# Patient Record
Sex: FEMALE | Race: WHITE | NOT HISPANIC OR LATINO | Employment: OTHER | RURAL
[De-identification: names, ages, dates, MRNs, and addresses within clinical notes are randomized per-mention and may not be internally consistent; named-entity substitution may affect disease eponyms.]

---

## 2011-03-11 LAB — CRC RECOMMENDATION EXT: NORMAL

## 2020-10-21 ENCOUNTER — HISTORICAL (OUTPATIENT)
Dept: ADMINISTRATIVE | Facility: HOSPITAL | Age: 65
End: 2020-10-21

## 2021-03-13 DIAGNOSIS — Z12.31 SCREENING MAMMOGRAM FOR HIGH-RISK PATIENT: Primary | ICD-10-CM

## 2021-04-05 RX ORDER — CARBAMAZEPINE 100 MG/1
100 TABLET, CHEWABLE ORAL 2 TIMES DAILY
COMMUNITY
Start: 2021-01-20 | End: 2022-03-24

## 2021-04-05 RX ORDER — FLUTICASONE PROPIONATE 50 MCG
2 SPRAY, SUSPENSION (ML) NASAL DAILY
COMMUNITY
Start: 2021-02-17 | End: 2021-10-13

## 2021-04-05 RX ORDER — AZELASTINE 1 MG/ML
SPRAY, METERED NASAL
COMMUNITY
Start: 2021-03-06 | End: 2021-12-29

## 2021-04-05 RX ORDER — INSULIN LISPRO 100 [IU]/ML
INJECTION, SOLUTION INTRAVENOUS; SUBCUTANEOUS
COMMUNITY
End: 2022-03-25

## 2021-04-05 RX ORDER — COLESEVELAM 180 1/1
1875 TABLET ORAL 2 TIMES DAILY WITH MEALS
COMMUNITY
End: 2022-03-10 | Stop reason: SDUPTHER

## 2021-04-05 RX ORDER — BREXPIPRAZOLE 2 MG/1
1 TABLET ORAL DAILY
COMMUNITY
Start: 2021-02-26 | End: 2021-07-09 | Stop reason: SDUPTHER

## 2021-04-05 RX ORDER — GABAPENTIN 100 MG/1
CAPSULE ORAL
COMMUNITY
Start: 2020-12-30 | End: 2021-07-13 | Stop reason: SDUPTHER

## 2021-04-05 RX ORDER — DULOXETIN HYDROCHLORIDE 60 MG/1
60 CAPSULE, DELAYED RELEASE ORAL DAILY
COMMUNITY
Start: 2021-02-10 | End: 2021-11-09

## 2021-04-05 RX ORDER — DICLOFENAC SODIUM 10 MG/G
2 GEL TOPICAL 4 TIMES DAILY PRN
COMMUNITY

## 2021-04-05 RX ORDER — CETIRIZINE HYDROCHLORIDE 10 MG/1
1 TABLET ORAL DAILY
COMMUNITY
Start: 2021-03-01 | End: 2022-09-28 | Stop reason: SDUPTHER

## 2021-04-05 RX ORDER — HYDROXYZINE PAMOATE 25 MG/1
25 CAPSULE ORAL EVERY 6 HOURS PRN
COMMUNITY
Start: 2021-03-02 | End: 2022-01-26 | Stop reason: SDUPTHER

## 2021-04-05 RX ORDER — MIRABEGRON 50 MG/1
1 TABLET, FILM COATED, EXTENDED RELEASE ORAL DAILY
COMMUNITY
End: 2021-07-09 | Stop reason: SDUPTHER

## 2021-04-05 RX ORDER — LOSARTAN POTASSIUM 50 MG/1
50 TABLET ORAL DAILY
COMMUNITY
Start: 2021-03-09 | End: 2021-05-07 | Stop reason: DRUGHIGH

## 2021-04-05 RX ORDER — NYSTATIN 100000 U/G
CREAM TOPICAL
COMMUNITY
Start: 2021-02-10 | End: 2023-06-08 | Stop reason: SDUPTHER

## 2021-04-05 RX ORDER — TRAZODONE HYDROCHLORIDE 150 MG/1
1 TABLET ORAL NIGHTLY
COMMUNITY
Start: 2021-01-13 | End: 2022-01-10 | Stop reason: SDUPTHER

## 2021-04-05 RX ORDER — MELOXICAM 7.5 MG/1
1 TABLET ORAL DAILY
COMMUNITY
Start: 2021-02-23 | End: 2022-02-16 | Stop reason: SDUPTHER

## 2021-04-05 RX ORDER — PROMETHAZINE HYDROCHLORIDE 25 MG/1
TABLET ORAL
COMMUNITY
Start: 2021-01-15 | End: 2021-07-09 | Stop reason: SDUPTHER

## 2021-04-05 RX ORDER — LEVOTHYROXINE SODIUM 175 UG/1
1 TABLET ORAL DAILY
COMMUNITY
Start: 2021-02-17 | End: 2022-03-25

## 2021-04-05 RX ORDER — GABAPENTIN 800 MG/1
TABLET ORAL
COMMUNITY
Start: 2021-03-01 | End: 2021-07-09 | Stop reason: SDUPTHER

## 2021-04-05 RX ORDER — MONTELUKAST SODIUM 10 MG/1
10 TABLET ORAL DAILY
COMMUNITY
Start: 2021-02-01 | End: 2022-02-16 | Stop reason: SDUPTHER

## 2021-04-05 RX ORDER — LATANOPROST 50 UG/ML
SOLUTION/ DROPS OPHTHALMIC
COMMUNITY
Start: 2021-02-22

## 2021-04-05 RX ORDER — TIZANIDINE 4 MG/1
TABLET ORAL
COMMUNITY
Start: 2021-01-27 | End: 2022-01-31 | Stop reason: SDUPTHER

## 2021-04-05 RX ORDER — INSULIN ASPART 100 [IU]/ML
50 INJECTION, SOLUTION INTRAVENOUS; SUBCUTANEOUS
COMMUNITY
End: 2023-06-08 | Stop reason: SDUPTHER

## 2021-04-05 RX ORDER — VALACYCLOVIR HYDROCHLORIDE 1 G/1
TABLET, FILM COATED ORAL
COMMUNITY
Start: 2021-02-05 | End: 2021-04-09 | Stop reason: SDUPTHER

## 2021-04-05 RX ORDER — OXYCODONE AND ACETAMINOPHEN 10; 325 MG/1; MG/1
TABLET ORAL
COMMUNITY
Start: 2021-03-12 | End: 2021-04-09 | Stop reason: SDUPTHER

## 2021-04-05 RX ORDER — LEVOTHYROXINE SODIUM 150 MCG
TABLET ORAL
COMMUNITY
Start: 2021-02-17 | End: 2022-01-19 | Stop reason: SDUPTHER

## 2021-04-05 RX ORDER — LINACLOTIDE 72 UG/1
1 CAPSULE, GELATIN COATED ORAL DAILY
COMMUNITY
Start: 2021-03-09 | End: 2022-01-10 | Stop reason: SDUPTHER

## 2021-04-05 RX ORDER — DEXLANSOPRAZOLE 60 MG/1
60 CAPSULE, DELAYED RELEASE ORAL 2 TIMES DAILY
COMMUNITY
End: 2022-04-11

## 2021-04-05 RX ORDER — BENZONATATE 100 MG/1
100 CAPSULE ORAL 3 TIMES DAILY PRN
COMMUNITY
End: 2021-04-07 | Stop reason: SDUPTHER

## 2021-04-07 RX ORDER — BENZONATATE 100 MG/1
100 CAPSULE ORAL 3 TIMES DAILY PRN
Qty: 90 CAPSULE | Refills: 5 | Status: SHIPPED | OUTPATIENT
Start: 2021-04-07 | End: 2021-04-09 | Stop reason: SDUPTHER

## 2021-04-08 ENCOUNTER — TELEPHONE (OUTPATIENT)
Dept: FAMILY MEDICINE | Facility: CLINIC | Age: 66
End: 2021-04-08

## 2021-04-09 ENCOUNTER — OFFICE VISIT (OUTPATIENT)
Dept: FAMILY MEDICINE | Facility: CLINIC | Age: 66
End: 2021-04-09
Payer: MEDICARE

## 2021-04-09 VITALS
DIASTOLIC BLOOD PRESSURE: 80 MMHG | RESPIRATION RATE: 18 BRPM | TEMPERATURE: 97 F | HEART RATE: 90 BPM | SYSTOLIC BLOOD PRESSURE: 160 MMHG | WEIGHT: 144 LBS | BODY MASS INDEX: 29.03 KG/M2 | HEIGHT: 59 IN

## 2021-04-09 DIAGNOSIS — G89.4 CHRONIC PAIN SYNDROME: ICD-10-CM

## 2021-04-09 DIAGNOSIS — R74.01 ELEVATED LEVELS OF TRANSAMINASE & LACTIC ACID DEHYDROGENASE: ICD-10-CM

## 2021-04-09 DIAGNOSIS — E03.9 HYPOTHYROIDISM, UNSPECIFIED TYPE: ICD-10-CM

## 2021-04-09 DIAGNOSIS — E10.649 TYPE 1 DIABETES MELLITUS WITH HYPOGLYCEMIA AND WITHOUT COMA: Primary | ICD-10-CM

## 2021-04-09 DIAGNOSIS — Z79.899 ENCOUNTER FOR LONG-TERM (CURRENT) USE OF OTHER MEDICATIONS: ICD-10-CM

## 2021-04-09 DIAGNOSIS — R74.02 ELEVATED LEVELS OF TRANSAMINASE & LACTIC ACID DEHYDROGENASE: ICD-10-CM

## 2021-04-09 PROCEDURE — 99214 PR OFFICE/OUTPT VISIT, EST, LEVL IV, 30-39 MIN: ICD-10-PCS | Mod: ,,, | Performed by: FAMILY MEDICINE

## 2021-04-09 PROCEDURE — 99214 OFFICE O/P EST MOD 30 MIN: CPT | Mod: ,,, | Performed by: FAMILY MEDICINE

## 2021-04-09 RX ORDER — VALACYCLOVIR HYDROCHLORIDE 1 G/1
500 TABLET, FILM COATED ORAL EVERY 12 HOURS
Qty: 14 TABLET | Refills: 5 | Status: SHIPPED | OUTPATIENT
Start: 2021-04-09 | End: 2022-03-10

## 2021-04-09 RX ORDER — OXYCODONE AND ACETAMINOPHEN 10; 325 MG/1; MG/1
1 TABLET ORAL EVERY 6 HOURS PRN
Qty: 120 TABLET | Refills: 0 | Status: SHIPPED | OUTPATIENT
Start: 2021-04-09 | End: 2021-07-09 | Stop reason: SDUPTHER

## 2021-04-09 RX ORDER — OXYCODONE AND ACETAMINOPHEN 10; 325 MG/1; MG/1
1 TABLET ORAL EVERY 6 HOURS PRN
Qty: 120 TABLET | Refills: 0 | Status: SHIPPED | OUTPATIENT
Start: 2021-04-09 | End: 2021-04-09 | Stop reason: SDUPTHER

## 2021-04-09 RX ORDER — BENZONATATE 100 MG/1
100 CAPSULE ORAL 3 TIMES DAILY PRN
Qty: 90 CAPSULE | Refills: 5 | Status: SHIPPED | OUTPATIENT
Start: 2021-04-09 | End: 2021-11-10

## 2021-04-11 PROBLEM — Z79.899 ENCOUNTER FOR LONG-TERM (CURRENT) USE OF OTHER MEDICATIONS: Status: ACTIVE | Noted: 2021-04-11

## 2021-05-05 ENCOUNTER — TELEPHONE (OUTPATIENT)
Dept: FAMILY MEDICINE | Facility: CLINIC | Age: 66
End: 2021-05-05

## 2021-05-05 RX ORDER — OXYCODONE AND ACETAMINOPHEN 7.5; 325 MG/1; MG/1
1 TABLET ORAL EVERY 6 HOURS PRN
Qty: 20 TABLET | Refills: 0 | Status: SHIPPED | OUTPATIENT
Start: 2021-05-05 | End: 2021-07-09 | Stop reason: DRUGHIGH

## 2021-05-07 ENCOUNTER — TELEPHONE (OUTPATIENT)
Dept: FAMILY MEDICINE | Facility: CLINIC | Age: 66
End: 2021-05-07

## 2021-05-07 RX ORDER — LOSARTAN POTASSIUM 100 MG/1
100 TABLET ORAL DAILY
Qty: 90 TABLET | Refills: 3 | Status: SHIPPED | OUTPATIENT
Start: 2021-05-07 | End: 2022-04-27

## 2021-07-09 ENCOUNTER — OFFICE VISIT (OUTPATIENT)
Dept: FAMILY MEDICINE | Facility: CLINIC | Age: 66
End: 2021-07-09
Payer: MEDICARE

## 2021-07-09 VITALS
SYSTOLIC BLOOD PRESSURE: 140 MMHG | WEIGHT: 151 LBS | TEMPERATURE: 98 F | BODY MASS INDEX: 30.44 KG/M2 | DIASTOLIC BLOOD PRESSURE: 78 MMHG | HEART RATE: 90 BPM | RESPIRATION RATE: 16 BRPM | HEIGHT: 59 IN

## 2021-07-09 DIAGNOSIS — E10.649 TYPE 1 DIABETES MELLITUS WITH HYPOGLYCEMIA AND WITHOUT COMA: Primary | ICD-10-CM

## 2021-07-09 DIAGNOSIS — G89.4 CHRONIC PAIN SYNDROME: ICD-10-CM

## 2021-07-09 DIAGNOSIS — R74.01 ELEVATED LEVELS OF TRANSAMINASE & LACTIC ACID DEHYDROGENASE: ICD-10-CM

## 2021-07-09 DIAGNOSIS — Z79.899 ENCOUNTER FOR LONG-TERM (CURRENT) USE OF OTHER MEDICATIONS: ICD-10-CM

## 2021-07-09 DIAGNOSIS — R74.02 ELEVATED LEVELS OF TRANSAMINASE & LACTIC ACID DEHYDROGENASE: ICD-10-CM

## 2021-07-09 DIAGNOSIS — M81.0 OSTEOPOROSIS, UNSPECIFIED OSTEOPOROSIS TYPE, UNSPECIFIED PATHOLOGICAL FRACTURE PRESENCE: ICD-10-CM

## 2021-07-09 LAB
ALBUMIN SERPL BCP-MCNC: 3.7 G/DL (ref 3.5–5)
ALBUMIN/GLOB SERPL: 1.1 {RATIO}
ALP SERPL-CCNC: 148 U/L (ref 55–142)
ALT SERPL W P-5'-P-CCNC: 172 U/L (ref 13–56)
ANION GAP SERPL CALCULATED.3IONS-SCNC: 8 MMOL/L (ref 7–16)
AST SERPL W P-5'-P-CCNC: 444 U/L (ref 15–37)
BILIRUB SERPL-MCNC: 0.4 MG/DL (ref 0–1.2)
BUN SERPL-MCNC: 10 MG/DL (ref 7–18)
BUN/CREAT SERPL: 15 (ref 6–20)
CALCIUM SERPL-MCNC: 9.1 MG/DL (ref 8.5–10.1)
CHLORIDE SERPL-SCNC: 98 MMOL/L (ref 98–107)
CO2 SERPL-SCNC: 28 MMOL/L (ref 21–32)
CREAT SERPL-MCNC: 0.68 MG/DL (ref 0.55–1.02)
CTP QC/QA: YES
EST. AVERAGE GLUCOSE BLD GHB EST-MCNC: 160 MG/DL
GLOBULIN SER-MCNC: 3.4 G/DL (ref 2–4)
GLUCOSE SERPL-MCNC: 163 MG/DL (ref 74–106)
HBA1C MFR BLD HPLC: 7.4 % (ref 4.5–6.6)
POC (AMP) AMPHETAMINE: NEGATIVE
POC (BAR) BARBITURATES: NEGATIVE
POC (BUP) BUPRENORPHINE: NEGATIVE
POC (BZO) BENZODIAZEPINES: NEGATIVE
POC (COC) COCAINE: NEGATIVE
POC (MDMA) METHYLENEDIOXYMETHAMPHETAMINE 3,4: NEGATIVE
POC (MET) METHAMPHETAMINE: NEGATIVE
POC (MOP) OPIATES: NEGATIVE
POC (MTD) METHADONE: NEGATIVE
POC (OXY) OXYCODONE: ABNORMAL
POC (PCP) PHENCYCLIDINE: NEGATIVE
POC (TCA) TRICYCLIC ANTIDEPRESSANTS: NEGATIVE
POC TEMPERATURE (URINE): 94
POC THC: NEGATIVE
POTASSIUM SERPL-SCNC: 4.2 MMOL/L (ref 3.5–5.1)
PROT SERPL-MCNC: 7.1 G/DL (ref 6.4–8.2)
SODIUM SERPL-SCNC: 130 MMOL/L (ref 136–145)

## 2021-07-09 PROCEDURE — 83036 HEMOGLOBIN A1C: ICD-10-PCS | Mod: ,,, | Performed by: CLINICAL MEDICAL LABORATORY

## 2021-07-09 PROCEDURE — 80053 COMPREHENSIVE METABOLIC PANEL: ICD-10-PCS | Mod: ,,, | Performed by: CLINICAL MEDICAL LABORATORY

## 2021-07-09 PROCEDURE — 99213 PR OFFICE/OUTPT VISIT, EST, LEVL III, 20-29 MIN: ICD-10-PCS | Mod: 25,,, | Performed by: FAMILY MEDICINE

## 2021-07-09 PROCEDURE — 96372 PR INJECTION,THERAP/PROPH/DIAG2ST, IM OR SUBCUT: ICD-10-PCS | Mod: ,,, | Performed by: FAMILY MEDICINE

## 2021-07-09 PROCEDURE — 80305 DRUG TEST PRSMV DIR OPT OBS: CPT | Mod: RHCUB | Performed by: FAMILY MEDICINE

## 2021-07-09 PROCEDURE — 80053 COMPREHEN METABOLIC PANEL: CPT | Mod: ,,, | Performed by: CLINICAL MEDICAL LABORATORY

## 2021-07-09 PROCEDURE — 99213 OFFICE O/P EST LOW 20 MIN: CPT | Mod: 25,,, | Performed by: FAMILY MEDICINE

## 2021-07-09 PROCEDURE — 96372 THER/PROPH/DIAG INJ SC/IM: CPT | Mod: ,,, | Performed by: FAMILY MEDICINE

## 2021-07-09 PROCEDURE — 83036 HEMOGLOBIN GLYCOSYLATED A1C: CPT | Mod: ,,, | Performed by: CLINICAL MEDICAL LABORATORY

## 2021-07-09 RX ORDER — GABAPENTIN 800 MG/1
800 TABLET ORAL NIGHTLY
Qty: 90 TABLET | Refills: 3 | Status: SHIPPED | OUTPATIENT
Start: 2021-07-09 | End: 2022-08-10 | Stop reason: SDUPTHER

## 2021-07-09 RX ORDER — OXYCODONE AND ACETAMINOPHEN 10; 325 MG/1; MG/1
1 TABLET ORAL EVERY 6 HOURS PRN
Qty: 120 TABLET | Refills: 0 | Status: SHIPPED | OUTPATIENT
Start: 2021-07-09 | End: 2021-10-08 | Stop reason: SDUPTHER

## 2021-07-09 RX ORDER — MIRABEGRON 50 MG/1
1 TABLET, FILM COATED, EXTENDED RELEASE ORAL DAILY
Qty: 90 TABLET | Refills: 3 | Status: SHIPPED | OUTPATIENT
Start: 2021-07-09 | End: 2021-09-16 | Stop reason: SDUPTHER

## 2021-07-09 RX ORDER — OXYCODONE AND ACETAMINOPHEN 10; 325 MG/1; MG/1
1 TABLET ORAL EVERY 6 HOURS PRN
Qty: 120 TABLET | Refills: 0 | Status: SHIPPED | OUTPATIENT
Start: 2021-07-09 | End: 2021-07-09 | Stop reason: SDUPTHER

## 2021-07-09 RX ORDER — PROMETHAZINE HYDROCHLORIDE 25 MG/1
25 TABLET ORAL EVERY 6 HOURS PRN
Qty: 30 TABLET | Refills: 5 | Status: SHIPPED | OUTPATIENT
Start: 2021-07-09 | End: 2021-11-18

## 2021-07-09 RX ORDER — BREXPIPRAZOLE 2 MG/1
1 TABLET ORAL DAILY
Qty: 90 TABLET | Refills: 3 | Status: SHIPPED | OUTPATIENT
Start: 2021-07-09 | End: 2022-04-01 | Stop reason: SDUPTHER

## 2021-07-13 RX ORDER — GABAPENTIN 100 MG/1
100 CAPSULE ORAL 2 TIMES DAILY
Qty: 180 CAPSULE | Refills: 3 | Status: SHIPPED | OUTPATIENT
Start: 2021-07-13 | End: 2022-07-27 | Stop reason: SDUPTHER

## 2021-07-14 DIAGNOSIS — R74.01 ELEVATED LEVELS OF TRANSAMINASE & LACTIC ACID DEHYDROGENASE: Primary | ICD-10-CM

## 2021-07-14 DIAGNOSIS — R74.02 ELEVATED LEVELS OF TRANSAMINASE & LACTIC ACID DEHYDROGENASE: Primary | ICD-10-CM

## 2021-07-22 ENCOUNTER — OFFICE VISIT (OUTPATIENT)
Dept: GASTROENTEROLOGY | Facility: CLINIC | Age: 66
End: 2021-07-22
Payer: MEDICARE

## 2021-07-22 ENCOUNTER — HOSPITAL ENCOUNTER (OUTPATIENT)
Dept: RADIOLOGY | Facility: HOSPITAL | Age: 66
Discharge: HOME | End: 2021-07-22
Attending: FAMILY MEDICINE
Payer: MEDICARE

## 2021-07-22 VITALS
WEIGHT: 151 LBS | HEART RATE: 92 BPM | DIASTOLIC BLOOD PRESSURE: 73 MMHG | OXYGEN SATURATION: 95 % | BODY MASS INDEX: 30.44 KG/M2 | SYSTOLIC BLOOD PRESSURE: 165 MMHG | HEIGHT: 59 IN

## 2021-07-22 DIAGNOSIS — R94.5 ABNORMAL RESULTS OF LIVER FUNCTION STUDIES: ICD-10-CM

## 2021-07-22 DIAGNOSIS — R74.8 ELEVATED LIVER ENZYMES: Primary | ICD-10-CM

## 2021-07-22 DIAGNOSIS — R74.02 ELEVATED LEVELS OF TRANSAMINASE & LACTIC ACID DEHYDROGENASE: ICD-10-CM

## 2021-07-22 DIAGNOSIS — R74.01 ELEVATED LEVELS OF TRANSAMINASE & LACTIC ACID DEHYDROGENASE: ICD-10-CM

## 2021-07-22 DIAGNOSIS — R11.0 NAUSEA: ICD-10-CM

## 2021-07-22 PROCEDURE — 99214 OFFICE O/P EST MOD 30 MIN: CPT | Mod: ,,, | Performed by: NURSE PRACTITIONER

## 2021-07-22 PROCEDURE — 76700 US ABDOMEN COMPLETE: ICD-10-PCS | Mod: 26,,, | Performed by: STUDENT IN AN ORGANIZED HEALTH CARE EDUCATION/TRAINING PROGRAM

## 2021-07-22 PROCEDURE — 76700 US EXAM ABDOM COMPLETE: CPT | Mod: 26,,, | Performed by: STUDENT IN AN ORGANIZED HEALTH CARE EDUCATION/TRAINING PROGRAM

## 2021-07-22 PROCEDURE — 99214 PR OFFICE/OUTPT VISIT, EST, LEVL IV, 30-39 MIN: ICD-10-PCS | Mod: ,,, | Performed by: NURSE PRACTITIONER

## 2021-07-22 PROCEDURE — 76700 US EXAM ABDOM COMPLETE: CPT | Mod: TC

## 2021-08-19 ENCOUNTER — OFFICE VISIT (OUTPATIENT)
Dept: GASTROENTEROLOGY | Facility: CLINIC | Age: 66
End: 2021-08-19
Payer: MEDICARE

## 2021-08-19 VITALS
HEIGHT: 59 IN | WEIGHT: 151 LBS | BODY MASS INDEX: 30.44 KG/M2 | HEART RATE: 78 BPM | SYSTOLIC BLOOD PRESSURE: 163 MMHG | DIASTOLIC BLOOD PRESSURE: 69 MMHG | OXYGEN SATURATION: 98 %

## 2021-08-19 DIAGNOSIS — K59.00 CONSTIPATION, UNSPECIFIED CONSTIPATION TYPE: ICD-10-CM

## 2021-08-19 DIAGNOSIS — R94.5 ABNORMAL RESULTS OF LIVER FUNCTION STUDIES: Primary | ICD-10-CM

## 2021-08-19 PROCEDURE — 99214 OFFICE O/P EST MOD 30 MIN: CPT | Mod: ,,, | Performed by: NURSE PRACTITIONER

## 2021-08-19 PROCEDURE — 99214 PR OFFICE/OUTPT VISIT, EST, LEVL IV, 30-39 MIN: ICD-10-PCS | Mod: ,,, | Performed by: NURSE PRACTITIONER

## 2021-09-16 RX ORDER — MIRABEGRON 50 MG/1
1 TABLET, FILM COATED, EXTENDED RELEASE ORAL DAILY
Qty: 90 TABLET | Refills: 3 | Status: SHIPPED | OUTPATIENT
Start: 2021-09-16 | End: 2022-04-01 | Stop reason: SDUPTHER

## 2021-10-08 ENCOUNTER — OFFICE VISIT (OUTPATIENT)
Dept: FAMILY MEDICINE | Facility: CLINIC | Age: 66
End: 2021-10-08
Payer: MEDICARE

## 2021-10-08 VITALS
WEIGHT: 144 LBS | HEIGHT: 59 IN | SYSTOLIC BLOOD PRESSURE: 136 MMHG | DIASTOLIC BLOOD PRESSURE: 70 MMHG | HEART RATE: 78 BPM | TEMPERATURE: 98 F | OXYGEN SATURATION: 97 % | BODY MASS INDEX: 29.03 KG/M2 | RESPIRATION RATE: 16 BRPM

## 2021-10-08 DIAGNOSIS — Z79.899 ENCOUNTER FOR LONG-TERM (CURRENT) USE OF OTHER MEDICATIONS: ICD-10-CM

## 2021-10-08 DIAGNOSIS — E10.649 TYPE 1 DIABETES MELLITUS WITH HYPOGLYCEMIA AND WITHOUT COMA: Primary | ICD-10-CM

## 2021-10-08 DIAGNOSIS — G89.4 CHRONIC PAIN SYNDROME: ICD-10-CM

## 2021-10-08 DIAGNOSIS — R74.02 ELEVATED LEVELS OF TRANSAMINASE & LACTIC ACID DEHYDROGENASE: ICD-10-CM

## 2021-10-08 DIAGNOSIS — E78.2 MIXED HYPERLIPIDEMIA: ICD-10-CM

## 2021-10-08 DIAGNOSIS — R74.01 ELEVATED LEVELS OF TRANSAMINASE & LACTIC ACID DEHYDROGENASE: ICD-10-CM

## 2021-10-08 DIAGNOSIS — I25.10 CORONARY ARTERY DISEASE, UNSPECIFIED VESSEL OR LESION TYPE, UNSPECIFIED WHETHER ANGINA PRESENT, UNSPECIFIED WHETHER NATIVE OR TRANSPLANTED HEART: ICD-10-CM

## 2021-10-08 LAB
ALBUMIN SERPL BCP-MCNC: 3.7 G/DL (ref 3.5–5)
ALBUMIN/GLOB SERPL: 1.1 {RATIO}
ALP SERPL-CCNC: 208 U/L (ref 55–142)
ALT SERPL W P-5'-P-CCNC: 43 U/L (ref 13–56)
ANION GAP SERPL CALCULATED.3IONS-SCNC: 8 MMOL/L (ref 7–16)
AST SERPL W P-5'-P-CCNC: 21 U/L (ref 15–37)
BILIRUB SERPL-MCNC: 0.2 MG/DL (ref 0–1.2)
BUN SERPL-MCNC: 6 MG/DL (ref 7–18)
BUN/CREAT SERPL: 11 (ref 6–20)
CALCIUM SERPL-MCNC: 9.2 MG/DL (ref 8.5–10.1)
CHLORIDE SERPL-SCNC: 105 MMOL/L (ref 98–107)
CHOLEST SERPL-MCNC: 209 MG/DL (ref 0–200)
CHOLEST/HDLC SERPL: 5.1 {RATIO}
CO2 SERPL-SCNC: 27 MMOL/L (ref 21–32)
CREAT SERPL-MCNC: 0.56 MG/DL (ref 0.55–1.02)
CTP QC/QA: YES
EST. AVERAGE GLUCOSE BLD GHB EST-MCNC: 167 MG/DL
GLOBULIN SER-MCNC: 3.4 G/DL (ref 2–4)
GLUCOSE SERPL-MCNC: 152 MG/DL (ref 74–106)
HBA1C MFR BLD HPLC: 7.6 % (ref 4.5–6.6)
HDLC SERPL-MCNC: 41 MG/DL (ref 40–60)
LDLC SERPL CALC-MCNC: 147 MG/DL
LDLC/HDLC SERPL: 3.6 {RATIO}
NONHDLC SERPL-MCNC: 168 MG/DL
POC (AMP) AMPHETAMINE: NEGATIVE
POC (BAR) BARBITURATES: NEGATIVE
POC (BUP) BUPRENORPHINE: NEGATIVE
POC (BZO) BENZODIAZEPINES: NEGATIVE
POC (COC) COCAINE: NEGATIVE
POC (MDMA) METHYLENEDIOXYMETHAMPHETAMINE 3,4: NEGATIVE
POC (MET) METHAMPHETAMINE: NEGATIVE
POC (MOP) OPIATES: ABNORMAL
POC (MTD) METHADONE: NEGATIVE
POC (OXY) OXYCODONE: ABNORMAL
POC (PCP) PHENCYCLIDINE: NEGATIVE
POC (TCA) TRICYCLIC ANTIDEPRESSANTS: NEGATIVE
POC TEMPERATURE (URINE): 94
POC THC: NEGATIVE
POTASSIUM SERPL-SCNC: 4.1 MMOL/L (ref 3.5–5.1)
PROT SERPL-MCNC: 7.1 G/DL (ref 6.4–8.2)
SODIUM SERPL-SCNC: 136 MMOL/L (ref 136–145)
TRIGL SERPL-MCNC: 105 MG/DL (ref 35–150)
VLDLC SERPL-MCNC: 21 MG/DL

## 2021-10-08 PROCEDURE — 83036 HEMOGLOBIN GLYCOSYLATED A1C: CPT | Mod: ,,, | Performed by: CLINICAL MEDICAL LABORATORY

## 2021-10-08 PROCEDURE — 80053 COMPREHEN METABOLIC PANEL: CPT | Mod: ,,, | Performed by: CLINICAL MEDICAL LABORATORY

## 2021-10-08 PROCEDURE — 83036 HEMOGLOBIN A1C: ICD-10-PCS | Mod: ,,, | Performed by: CLINICAL MEDICAL LABORATORY

## 2021-10-08 PROCEDURE — 80061 LIPID PANEL: ICD-10-PCS | Mod: ,,, | Performed by: CLINICAL MEDICAL LABORATORY

## 2021-10-08 PROCEDURE — 80061 LIPID PANEL: CPT | Mod: ,,, | Performed by: CLINICAL MEDICAL LABORATORY

## 2021-10-08 PROCEDURE — 99214 OFFICE O/P EST MOD 30 MIN: CPT | Mod: ,,, | Performed by: FAMILY MEDICINE

## 2021-10-08 PROCEDURE — 99214 PR OFFICE/OUTPT VISIT, EST, LEVL IV, 30-39 MIN: ICD-10-PCS | Mod: ,,, | Performed by: FAMILY MEDICINE

## 2021-10-08 PROCEDURE — 80053 COMPREHENSIVE METABOLIC PANEL: ICD-10-PCS | Mod: ,,, | Performed by: CLINICAL MEDICAL LABORATORY

## 2021-10-08 PROCEDURE — 80305 DRUG TEST PRSMV DIR OPT OBS: CPT | Mod: RHCUB | Performed by: FAMILY MEDICINE

## 2021-10-08 RX ORDER — CARVEDILOL 12.5 MG/1
TABLET ORAL
COMMUNITY
Start: 2021-10-01 | End: 2022-09-28 | Stop reason: SDUPTHER

## 2021-10-08 RX ORDER — OXYCODONE AND ACETAMINOPHEN 10; 325 MG/1; MG/1
1 TABLET ORAL EVERY 6 HOURS PRN
Qty: 120 TABLET | Refills: 0 | Status: SHIPPED | OUTPATIENT
Start: 2021-10-08 | End: 2021-10-08 | Stop reason: SDUPTHER

## 2021-10-08 RX ORDER — OXYCODONE AND ACETAMINOPHEN 10; 325 MG/1; MG/1
1 TABLET ORAL EVERY 6 HOURS PRN
Qty: 120 TABLET | Refills: 0 | Status: SHIPPED | OUTPATIENT
Start: 2021-10-08 | End: 2022-01-05 | Stop reason: SDUPTHER

## 2021-10-13 DIAGNOSIS — R74.01 ELEVATED LEVELS OF TRANSAMINASE & LACTIC ACID DEHYDROGENASE: Primary | ICD-10-CM

## 2021-10-13 DIAGNOSIS — Z79.899 ENCOUNTER FOR LONG-TERM (CURRENT) USE OF OTHER MEDICATIONS: ICD-10-CM

## 2021-10-13 DIAGNOSIS — R74.02 ELEVATED LEVELS OF TRANSAMINASE & LACTIC ACID DEHYDROGENASE: Primary | ICD-10-CM

## 2021-10-13 RX ORDER — FLUTICASONE PROPIONATE 50 MCG
2 SPRAY, SUSPENSION (ML) NASAL DAILY
Qty: 16 G | Refills: 11 | OUTPATIENT
Start: 2021-10-13

## 2021-10-20 ENCOUNTER — TELEPHONE (OUTPATIENT)
Dept: FAMILY MEDICINE | Facility: CLINIC | Age: 66
End: 2021-10-20

## 2022-01-05 ENCOUNTER — OFFICE VISIT (OUTPATIENT)
Dept: FAMILY MEDICINE | Facility: CLINIC | Age: 67
End: 2022-01-05
Payer: MEDICARE

## 2022-01-05 VITALS
OXYGEN SATURATION: 98 % | WEIGHT: 144 LBS | BODY MASS INDEX: 29.03 KG/M2 | HEART RATE: 78 BPM | TEMPERATURE: 98 F | RESPIRATION RATE: 16 BRPM | DIASTOLIC BLOOD PRESSURE: 78 MMHG | HEIGHT: 59 IN | SYSTOLIC BLOOD PRESSURE: 138 MMHG

## 2022-01-05 DIAGNOSIS — E78.2 MIXED HYPERLIPIDEMIA: ICD-10-CM

## 2022-01-05 DIAGNOSIS — R30.0 DYSURIA: ICD-10-CM

## 2022-01-05 DIAGNOSIS — R74.02 ELEVATED LEVELS OF TRANSAMINASE & LACTIC ACID DEHYDROGENASE: ICD-10-CM

## 2022-01-05 DIAGNOSIS — R74.01 ELEVATED LEVELS OF TRANSAMINASE & LACTIC ACID DEHYDROGENASE: ICD-10-CM

## 2022-01-05 DIAGNOSIS — Z79.899 ENCOUNTER FOR LONG-TERM (CURRENT) USE OF OTHER MEDICATIONS: ICD-10-CM

## 2022-01-05 DIAGNOSIS — E10.649 TYPE 1 DIABETES MELLITUS WITH HYPOGLYCEMIA AND WITHOUT COMA: Primary | ICD-10-CM

## 2022-01-05 DIAGNOSIS — I25.10 CORONARY ARTERY DISEASE, UNSPECIFIED VESSEL OR LESION TYPE, UNSPECIFIED WHETHER ANGINA PRESENT, UNSPECIFIED WHETHER NATIVE OR TRANSPLANTED HEART: ICD-10-CM

## 2022-01-05 DIAGNOSIS — E03.9 HYPOTHYROIDISM, UNSPECIFIED TYPE: ICD-10-CM

## 2022-01-05 LAB
ALBUMIN SERPL BCP-MCNC: 3.8 G/DL (ref 3.5–5)
ALBUMIN/GLOB SERPL: 1 {RATIO}
ALP SERPL-CCNC: 122 U/L (ref 55–142)
ALT SERPL W P-5'-P-CCNC: 52 U/L (ref 13–56)
ANION GAP SERPL CALCULATED.3IONS-SCNC: 8 MMOL/L (ref 7–16)
AST SERPL W P-5'-P-CCNC: 27 U/L (ref 15–37)
BASOPHILS # BLD AUTO: 0.04 K/UL (ref 0–0.2)
BASOPHILS NFR BLD AUTO: 0.5 % (ref 0–1)
BILIRUB SERPL-MCNC: 0.3 MG/DL (ref 0–1.2)
BUN SERPL-MCNC: 12 MG/DL (ref 7–18)
BUN/CREAT SERPL: 17 (ref 6–20)
CALCIUM SERPL-MCNC: 8.9 MG/DL (ref 8.5–10.1)
CHLORIDE SERPL-SCNC: 101 MMOL/L (ref 98–107)
CO2 SERPL-SCNC: 29 MMOL/L (ref 21–32)
CREAT SERPL-MCNC: 0.71 MG/DL (ref 0.55–1.02)
CREAT UR-MCNC: 88 MG/DL (ref 28–219)
CTP QC/QA: YES
DIFFERENTIAL METHOD BLD: ABNORMAL
EOSINOPHIL # BLD AUTO: 0.21 K/UL (ref 0–0.5)
EOSINOPHIL NFR BLD AUTO: 2.8 % (ref 1–4)
ERYTHROCYTE [DISTWIDTH] IN BLOOD BY AUTOMATED COUNT: 12 % (ref 11.5–14.5)
EST. AVERAGE GLUCOSE BLD GHB EST-MCNC: 154 MG/DL
GLOBULIN SER-MCNC: 3.7 G/DL (ref 2–4)
GLUCOSE SERPL-MCNC: 153 MG/DL (ref 74–106)
HBA1C MFR BLD HPLC: 7.2 % (ref 4.5–6.6)
HCT VFR BLD AUTO: 33.5 % (ref 38–47)
HGB BLD-MCNC: 11.4 G/DL (ref 12–16)
IMM GRANULOCYTES # BLD AUTO: 0.04 K/UL (ref 0–0.04)
IMM GRANULOCYTES NFR BLD: 0.5 % (ref 0–0.4)
LYMPHOCYTES # BLD AUTO: 1.46 K/UL (ref 1–4.8)
LYMPHOCYTES NFR BLD AUTO: 19.4 % (ref 27–41)
MAGNESIUM SERPL-MCNC: 2.2 MG/DL (ref 1.7–2.3)
MCH RBC QN AUTO: 30.9 PG (ref 27–31)
MCHC RBC AUTO-ENTMCNC: 34 G/DL (ref 32–36)
MCV RBC AUTO: 90.8 FL (ref 80–96)
MICROALBUMIN UR-MCNC: 6.3 MG/DL (ref 0–2.8)
MICROALBUMIN/CREAT RATIO PNL UR: 71.6 MG/G (ref 0–30)
MONOCYTES # BLD AUTO: 0.41 K/UL (ref 0–0.8)
MONOCYTES NFR BLD AUTO: 5.5 % (ref 2–6)
MPC BLD CALC-MCNC: 11 FL (ref 9.4–12.4)
NEUTROPHILS # BLD AUTO: 5.36 K/UL (ref 1.8–7.7)
NEUTROPHILS NFR BLD AUTO: 71.3 % (ref 53–65)
NRBC # BLD AUTO: 0 X10E3/UL
NRBC, AUTO (.00): 0 %
PLATELET # BLD AUTO: 293 K/UL (ref 150–400)
POC (AMP) AMPHETAMINE: NEGATIVE
POC (BAR) BARBITURATES: NEGATIVE
POC (BUP) BUPRENORPHINE: NEGATIVE
POC (BZO) BENZODIAZEPINES: NEGATIVE
POC (COC) COCAINE: NEGATIVE
POC (MDMA) METHYLENEDIOXYMETHAMPHETAMINE 3,4: NEGATIVE
POC (MET) METHAMPHETAMINE: NEGATIVE
POC (MOP) OPIATES: ABNORMAL
POC (MTD) METHADONE: NEGATIVE
POC (OXY) OXYCODONE: ABNORMAL
POC (PCP) PHENCYCLIDINE: NEGATIVE
POC (TCA) TRICYCLIC ANTIDEPRESSANTS: ABNORMAL
POC TEMPERATURE (URINE): 92
POC THC: NEGATIVE
POTASSIUM SERPL-SCNC: 4.7 MMOL/L (ref 3.5–5.1)
PROT SERPL-MCNC: 7.5 G/DL (ref 6.4–8.2)
RBC # BLD AUTO: 3.69 M/UL (ref 4.2–5.4)
SODIUM SERPL-SCNC: 133 MMOL/L (ref 136–145)
T4 FREE SERPL-MCNC: 1.45 NG/DL (ref 0.76–1.46)
TSH SERPL DL<=0.005 MIU/L-ACNC: 0.18 UIU/ML (ref 0.36–3.74)
WBC # BLD AUTO: 7.52 K/UL (ref 4.5–11)

## 2022-01-05 PROCEDURE — 80053 COMPREHEN METABOLIC PANEL: CPT | Mod: ,,, | Performed by: CLINICAL MEDICAL LABORATORY

## 2022-01-05 PROCEDURE — 80305 DRUG TEST PRSMV DIR OPT OBS: CPT | Mod: RHCUB | Performed by: FAMILY MEDICINE

## 2022-01-05 PROCEDURE — 80053 COMPREHENSIVE METABOLIC PANEL: ICD-10-PCS | Mod: ,,, | Performed by: CLINICAL MEDICAL LABORATORY

## 2022-01-05 PROCEDURE — 85025 COMPLETE CBC W/AUTO DIFF WBC: CPT | Mod: ,,, | Performed by: CLINICAL MEDICAL LABORATORY

## 2022-01-05 PROCEDURE — 85025 CBC WITH DIFFERENTIAL: ICD-10-PCS | Mod: ,,, | Performed by: CLINICAL MEDICAL LABORATORY

## 2022-01-05 PROCEDURE — 84443 TSH: ICD-10-PCS | Mod: ,,, | Performed by: CLINICAL MEDICAL LABORATORY

## 2022-01-05 PROCEDURE — 82043 UR ALBUMIN QUANTITATIVE: CPT | Mod: ,,, | Performed by: CLINICAL MEDICAL LABORATORY

## 2022-01-05 PROCEDURE — 83036 HEMOGLOBIN A1C: ICD-10-PCS | Mod: ,,, | Performed by: CLINICAL MEDICAL LABORATORY

## 2022-01-05 PROCEDURE — 99214 PR OFFICE/OUTPT VISIT, EST, LEVL IV, 30-39 MIN: ICD-10-PCS | Mod: ,,, | Performed by: FAMILY MEDICINE

## 2022-01-05 PROCEDURE — 82570 ASSAY OF URINE CREATININE: CPT | Mod: ,,, | Performed by: CLINICAL MEDICAL LABORATORY

## 2022-01-05 PROCEDURE — 87086 URINE CULTURE/COLONY COUNT: CPT | Mod: ,,, | Performed by: CLINICAL MEDICAL LABORATORY

## 2022-01-05 PROCEDURE — 82570 MICROALBUMIN / CREATININE RATIO URINE: ICD-10-PCS | Mod: ,,, | Performed by: CLINICAL MEDICAL LABORATORY

## 2022-01-05 PROCEDURE — 82043 MICROALBUMIN / CREATININE RATIO URINE: ICD-10-PCS | Mod: ,,, | Performed by: CLINICAL MEDICAL LABORATORY

## 2022-01-05 PROCEDURE — 83036 HEMOGLOBIN GLYCOSYLATED A1C: CPT | Mod: ,,, | Performed by: CLINICAL MEDICAL LABORATORY

## 2022-01-05 PROCEDURE — 84439 T4, FREE: ICD-10-PCS | Mod: ,,, | Performed by: CLINICAL MEDICAL LABORATORY

## 2022-01-05 PROCEDURE — 84439 ASSAY OF FREE THYROXINE: CPT | Mod: ,,, | Performed by: CLINICAL MEDICAL LABORATORY

## 2022-01-05 PROCEDURE — 99214 OFFICE O/P EST MOD 30 MIN: CPT | Mod: ,,, | Performed by: FAMILY MEDICINE

## 2022-01-05 PROCEDURE — 87086 CULTURE, URINE: ICD-10-PCS | Mod: ,,, | Performed by: CLINICAL MEDICAL LABORATORY

## 2022-01-05 PROCEDURE — 83735 MAGNESIUM: ICD-10-PCS | Mod: ,,, | Performed by: CLINICAL MEDICAL LABORATORY

## 2022-01-05 PROCEDURE — 83735 ASSAY OF MAGNESIUM: CPT | Mod: ,,, | Performed by: CLINICAL MEDICAL LABORATORY

## 2022-01-05 PROCEDURE — 84443 ASSAY THYROID STIM HORMONE: CPT | Mod: ,,, | Performed by: CLINICAL MEDICAL LABORATORY

## 2022-01-05 RX ORDER — OXYCODONE AND ACETAMINOPHEN 10; 325 MG/1; MG/1
1 TABLET ORAL EVERY 6 HOURS PRN
Qty: 120 TABLET | Refills: 0 | Status: SHIPPED | OUTPATIENT
Start: 2022-01-05 | End: 2022-03-24 | Stop reason: SDUPTHER

## 2022-01-05 RX ORDER — EVOLOCUMAB 140 MG/ML
140 INJECTION, SOLUTION SUBCUTANEOUS
Qty: 2 EACH | Refills: 3 | Status: SHIPPED | OUTPATIENT
Start: 2022-01-05 | End: 2022-03-25 | Stop reason: SDUPTHER

## 2022-01-05 RX ORDER — OXYCODONE AND ACETAMINOPHEN 10; 325 MG/1; MG/1
1 TABLET ORAL EVERY 6 HOURS PRN
Qty: 120 TABLET | Refills: 0 | Status: SHIPPED | OUTPATIENT
Start: 2022-01-05 | End: 2022-01-05 | Stop reason: SDUPTHER

## 2022-01-05 NOTE — PROGRESS NOTES
Subjective:       Patient ID: Nelida Nicholas is a 66 y.o. female.    Chief Complaint: Follow-up and Medication Refill    Doing well, 3 mo checkup.  Still has to check sugars up to 8 times a day, is on insulin pump, is brittle Type I diabetic with hypoglycemic events.  Of note, weaned off tegretol and we are hoping her liver and sodium are better.  I also notice that her facial tics are better and she agrees.    Review of Systems   Constitutional: Negative for appetite change, chills, fatigue, fever and unexpected weight change.   Respiratory: Negative for cough and shortness of breath.    Cardiovascular: Negative for chest pain and leg swelling.   Gastrointestinal: Negative for abdominal pain.   Musculoskeletal: Positive for arthralgias, gait problem and myalgias.   Integumentary:  Negative for rash.   Neurological: Positive for numbness. Negative for weakness.   Psychiatric/Behavioral: The patient is not nervous/anxious.          Objective:      Physical Exam  Constitutional:       General: She is not in acute distress.     Appearance: Normal appearance.   Cardiovascular:      Rate and Rhythm: Normal rate and regular rhythm.      Heart sounds: Normal heart sounds.   Pulmonary:      Breath sounds: Normal breath sounds.   Abdominal:      General: Abdomen is flat.      Palpations: Abdomen is soft.   Skin:     General: Skin is warm and dry.   Neurological:      Mental Status: She is alert. Mental status is at baseline.   Psychiatric:         Mood and Affect: Mood normal.         Behavior: Behavior normal.         Thought Content: Thought content normal.         Judgment: Judgment normal.         Assessment:       1. Type 1 diabetes mellitus with hypoglycemia and without coma  Hemoglobin A1C    Comprehensive Metabolic Panel    Urine culture    Microalbumin/Creatinine Ratio, Urine    Magnesium    Hemoglobin A1C    Comprehensive Metabolic Panel    Urine culture    Microalbumin/Creatinine Ratio, Urine    Magnesium    2. Elevated levels of transaminase & lactic acid dehydrogenase  Comprehensive Metabolic Panel    CBC Auto Differential    Comprehensive Metabolic Panel    CBC Auto Differential   3. Encounter for long-term (current) use of other medications  POCT Urine Drug Screen Presump    Hemoglobin A1C    Comprehensive Metabolic Panel    Urine culture    Microalbumin/Creatinine Ratio, Urine    CBC Auto Differential    Magnesium    Hemoglobin A1C    Comprehensive Metabolic Panel    Urine culture    Microalbumin/Creatinine Ratio, Urine    CBC Auto Differential    Magnesium   4. Mixed hyperlipidemia  Comprehensive Metabolic Panel    CBC Auto Differential    Comprehensive Metabolic Panel    CBC Auto Differential   5. Dysuria  Urine culture    CBC Auto Differential    Urine culture    CBC Auto Differential   6. Hypothyroidism, unspecified type  T4, Free    TSH    T4, Free    TSH   7. Coronary artery disease, unspecified vessel or lesion type, unspecified whether angina present, unspecified whether native or transplanted heart  Magnesium    Magnesium       Plan:       Ok to restart repatha but need to check LFTs in 4-6 weeks  Labs  Has mauricio appt   and UDS reviewed, ok for refills.  She keeps a BG log and it is necessary for her to check her sugars up to 8 times a day.

## 2022-01-07 DIAGNOSIS — D53.9 NUTRITIONAL ANEMIA: ICD-10-CM

## 2022-01-07 DIAGNOSIS — Z12.11 COLON CANCER SCREENING: Primary | ICD-10-CM

## 2022-01-07 LAB — UA COMPLETE W REFLEX CULTURE PNL UR: NORMAL

## 2022-01-12 DIAGNOSIS — Z86.010 HISTORY OF COLON POLYPS: Primary | ICD-10-CM

## 2022-01-12 RX ORDER — TRAZODONE HYDROCHLORIDE 150 MG/1
150 TABLET ORAL NIGHTLY
Qty: 90 TABLET | Refills: 3 | Status: SHIPPED | OUTPATIENT
Start: 2022-01-12 | End: 2022-04-01 | Stop reason: SDUPTHER

## 2022-01-12 RX ORDER — LINACLOTIDE 72 UG/1
72 CAPSULE, GELATIN COATED ORAL DAILY
Qty: 90 CAPSULE | Refills: 3 | Status: SHIPPED | OUTPATIENT
Start: 2022-01-12 | End: 2022-09-28 | Stop reason: SDUPTHER

## 2022-01-13 ENCOUNTER — CLINICAL SUPPORT (OUTPATIENT)
Dept: FAMILY MEDICINE | Facility: CLINIC | Age: 67
End: 2022-01-13
Payer: MEDICARE

## 2022-01-13 DIAGNOSIS — M81.0 OSTEOPOROSIS, UNSPECIFIED OSTEOPOROSIS TYPE, UNSPECIFIED PATHOLOGICAL FRACTURE PRESENCE: Primary | ICD-10-CM

## 2022-01-13 PROCEDURE — 96372 PR INJECTION,THERAP/PROPH/DIAG2ST, IM OR SUBCUT: ICD-10-PCS | Mod: ,,, | Performed by: FAMILY MEDICINE

## 2022-01-13 PROCEDURE — 96372 THER/PROPH/DIAG INJ SC/IM: CPT | Mod: ,,, | Performed by: FAMILY MEDICINE

## 2022-01-26 RX ORDER — HYDROXYZINE PAMOATE 25 MG/1
25 CAPSULE ORAL EVERY 6 HOURS PRN
Qty: 120 CAPSULE | Refills: 11 | Status: SHIPPED | OUTPATIENT
Start: 2022-01-26 | End: 2022-09-28 | Stop reason: SDUPTHER

## 2022-01-31 ENCOUNTER — TELEPHONE (OUTPATIENT)
Dept: FAMILY MEDICINE | Facility: CLINIC | Age: 67
End: 2022-01-31
Payer: MEDICARE

## 2022-01-31 RX ORDER — TIZANIDINE 4 MG/1
TABLET ORAL
Qty: 120 TABLET | Refills: 0 | Status: SHIPPED | OUTPATIENT
Start: 2022-01-31 | End: 2022-03-10 | Stop reason: SDUPTHER

## 2022-02-16 RX ORDER — MONTELUKAST SODIUM 10 MG/1
10 TABLET ORAL DAILY
Qty: 90 TABLET | Refills: 3 | Status: SHIPPED | OUTPATIENT
Start: 2022-02-16 | End: 2022-09-28 | Stop reason: SDUPTHER

## 2022-02-16 RX ORDER — MELOXICAM 7.5 MG/1
7.5 TABLET ORAL DAILY
Qty: 90 TABLET | Refills: 3 | Status: SHIPPED | OUTPATIENT
Start: 2022-02-16 | End: 2022-09-28 | Stop reason: SDUPTHER

## 2022-03-10 RX ORDER — VALACYCLOVIR HYDROCHLORIDE 500 MG/1
500 TABLET, FILM COATED ORAL 2 TIMES DAILY
Qty: 28 TABLET | Refills: 11 | Status: SHIPPED | OUTPATIENT
Start: 2022-03-10 | End: 2023-06-08

## 2022-03-10 RX ORDER — COLESEVELAM 180 1/1
1875 TABLET ORAL 2 TIMES DAILY WITH MEALS
Qty: 540 TABLET | Refills: 3 | Status: SHIPPED | OUTPATIENT
Start: 2022-03-10 | End: 2022-06-27 | Stop reason: SDUPTHER

## 2022-03-10 RX ORDER — TIZANIDINE 4 MG/1
TABLET ORAL
Qty: 360 TABLET | Refills: 3 | Status: SHIPPED | OUTPATIENT
Start: 2022-03-10 | End: 2022-09-28 | Stop reason: SDUPTHER

## 2022-03-10 RX ORDER — VALACYCLOVIR HYDROCHLORIDE 1 G/1
500 TABLET, FILM COATED ORAL EVERY 12 HOURS
Qty: 14 TABLET | Refills: 5 | OUTPATIENT
Start: 2022-03-10

## 2022-03-11 DIAGNOSIS — Z71.89 COMPLEX CARE COORDINATION: ICD-10-CM

## 2022-03-24 ENCOUNTER — OFFICE VISIT (OUTPATIENT)
Dept: FAMILY MEDICINE | Facility: CLINIC | Age: 67
End: 2022-03-24
Payer: MEDICARE

## 2022-03-24 VITALS
TEMPERATURE: 98 F | RESPIRATION RATE: 16 BRPM | OXYGEN SATURATION: 97 % | HEIGHT: 59 IN | BODY MASS INDEX: 29.03 KG/M2 | WEIGHT: 144 LBS | HEART RATE: 76 BPM | SYSTOLIC BLOOD PRESSURE: 110 MMHG | DIASTOLIC BLOOD PRESSURE: 70 MMHG

## 2022-03-24 DIAGNOSIS — R74.02 ELEVATED LEVELS OF TRANSAMINASE & LACTIC ACID DEHYDROGENASE: ICD-10-CM

## 2022-03-24 DIAGNOSIS — E10.649 TYPE 1 DIABETES MELLITUS WITH HYPOGLYCEMIA AND WITHOUT COMA: Primary | ICD-10-CM

## 2022-03-24 DIAGNOSIS — E03.9 HYPOTHYROIDISM, UNSPECIFIED TYPE: ICD-10-CM

## 2022-03-24 DIAGNOSIS — G89.4 CHRONIC PAIN SYNDROME: ICD-10-CM

## 2022-03-24 DIAGNOSIS — R74.01 ELEVATED LEVELS OF TRANSAMINASE & LACTIC ACID DEHYDROGENASE: ICD-10-CM

## 2022-03-24 DIAGNOSIS — D53.9 NUTRITIONAL ANEMIA: ICD-10-CM

## 2022-03-24 DIAGNOSIS — Z79.899 ENCOUNTER FOR LONG-TERM (CURRENT) USE OF OTHER MEDICATIONS: ICD-10-CM

## 2022-03-24 LAB
ALBUMIN SERPL BCP-MCNC: 3.4 G/DL (ref 3.5–5)
ALBUMIN/GLOB SERPL: 1 {RATIO}
ALP SERPL-CCNC: 81 U/L (ref 55–142)
ALT SERPL W P-5'-P-CCNC: 20 U/L (ref 13–56)
ANION GAP SERPL CALCULATED.3IONS-SCNC: 9 MMOL/L (ref 7–16)
AST SERPL W P-5'-P-CCNC: 21 U/L (ref 15–37)
BASOPHILS # BLD AUTO: 0.04 K/UL (ref 0–0.2)
BASOPHILS NFR BLD AUTO: 0.5 % (ref 0–1)
BILIRUB SERPL-MCNC: 0.2 MG/DL (ref 0–1.2)
BUN SERPL-MCNC: 11 MG/DL (ref 7–18)
BUN/CREAT SERPL: 14 (ref 6–20)
CALCIUM SERPL-MCNC: 8.8 MG/DL (ref 8.5–10.1)
CHLORIDE SERPL-SCNC: 104 MMOL/L (ref 98–107)
CO2 SERPL-SCNC: 28 MMOL/L (ref 21–32)
CREAT SERPL-MCNC: 0.76 MG/DL (ref 0.55–1.02)
CTP QC/QA: YES
DIFFERENTIAL METHOD BLD: ABNORMAL
EOSINOPHIL # BLD AUTO: 0.37 K/UL (ref 0–0.5)
EOSINOPHIL NFR BLD AUTO: 4.8 % (ref 1–4)
ERYTHROCYTE [DISTWIDTH] IN BLOOD BY AUTOMATED COUNT: 13.4 % (ref 11.5–14.5)
GLOBULIN SER-MCNC: 3.4 G/DL (ref 2–4)
GLUCOSE SERPL-MCNC: 158 MG/DL (ref 74–106)
HCT VFR BLD AUTO: 33.2 % (ref 38–47)
HGB BLD-MCNC: 10.5 G/DL (ref 12–16)
IMM GRANULOCYTES # BLD AUTO: 0.01 K/UL (ref 0–0.04)
IMM GRANULOCYTES NFR BLD: 0.1 % (ref 0–0.4)
LYMPHOCYTES # BLD AUTO: 2.15 K/UL (ref 1–4.8)
LYMPHOCYTES NFR BLD AUTO: 28.1 % (ref 27–41)
MCH RBC QN AUTO: 30.3 PG (ref 27–31)
MCHC RBC AUTO-ENTMCNC: 31.6 G/DL (ref 32–36)
MCV RBC AUTO: 96 FL (ref 80–96)
MONOCYTES # BLD AUTO: 0.55 K/UL (ref 0–0.8)
MONOCYTES NFR BLD AUTO: 7.2 % (ref 2–6)
MPC BLD CALC-MCNC: 11.5 FL (ref 9.4–12.4)
NEUTROPHILS # BLD AUTO: 4.52 K/UL (ref 1.8–7.7)
NEUTROPHILS NFR BLD AUTO: 59.3 % (ref 53–65)
NRBC # BLD AUTO: 0 X10E3/UL
NRBC, AUTO (.00): 0 %
PLATELET # BLD AUTO: 263 K/UL (ref 150–400)
POC (AMP) AMPHETAMINE: NEGATIVE
POC (BAR) BARBITURATES: NEGATIVE
POC (BUP) BUPRENORPHINE: NEGATIVE
POC (BZO) BENZODIAZEPINES: NEGATIVE
POC (COC) COCAINE: NEGATIVE
POC (MDMA) METHYLENEDIOXYMETHAMPHETAMINE 3,4: NEGATIVE
POC (MET) METHAMPHETAMINE: NEGATIVE
POC (MOP) OPIATES: ABNORMAL
POC (MTD) METHADONE: NEGATIVE
POC (OXY) OXYCODONE: ABNORMAL
POC (PCP) PHENCYCLIDINE: NEGATIVE
POC (TCA) TRICYCLIC ANTIDEPRESSANTS: ABNORMAL
POC TEMPERATURE (URINE): 90
POC THC: NEGATIVE
POTASSIUM SERPL-SCNC: 5 MMOL/L (ref 3.5–5.1)
PROT SERPL-MCNC: 6.8 G/DL (ref 6.4–8.2)
RBC # BLD AUTO: 3.46 M/UL (ref 4.2–5.4)
SODIUM SERPL-SCNC: 136 MMOL/L (ref 136–145)
T4 FREE SERPL-MCNC: 1.35 NG/DL (ref 0.76–1.46)
TSH SERPL DL<=0.005 MIU/L-ACNC: 0.18 UIU/ML (ref 0.36–3.74)
WBC # BLD AUTO: 7.64 K/UL (ref 4.5–11)

## 2022-03-24 PROCEDURE — 83036 HEMOGLOBIN A1C: ICD-10-PCS | Mod: ,,, | Performed by: CLINICAL MEDICAL LABORATORY

## 2022-03-24 PROCEDURE — 84439 ASSAY OF FREE THYROXINE: CPT | Mod: ,,, | Performed by: CLINICAL MEDICAL LABORATORY

## 2022-03-24 PROCEDURE — 84443 TSH: ICD-10-PCS | Mod: ,,, | Performed by: CLINICAL MEDICAL LABORATORY

## 2022-03-24 PROCEDURE — 80053 COMPREHEN METABOLIC PANEL: CPT | Mod: ,,, | Performed by: CLINICAL MEDICAL LABORATORY

## 2022-03-24 PROCEDURE — 99214 PR OFFICE/OUTPT VISIT, EST, LEVL IV, 30-39 MIN: ICD-10-PCS | Mod: ,,, | Performed by: FAMILY MEDICINE

## 2022-03-24 PROCEDURE — 85025 COMPLETE CBC W/AUTO DIFF WBC: CPT | Mod: ,,, | Performed by: CLINICAL MEDICAL LABORATORY

## 2022-03-24 PROCEDURE — 83036 HEMOGLOBIN GLYCOSYLATED A1C: CPT | Mod: ,,, | Performed by: CLINICAL MEDICAL LABORATORY

## 2022-03-24 PROCEDURE — 85025 CBC WITH DIFFERENTIAL: ICD-10-PCS | Mod: ,,, | Performed by: CLINICAL MEDICAL LABORATORY

## 2022-03-24 PROCEDURE — 80053 COMPREHENSIVE METABOLIC PANEL: ICD-10-PCS | Mod: ,,, | Performed by: CLINICAL MEDICAL LABORATORY

## 2022-03-24 PROCEDURE — 99214 OFFICE O/P EST MOD 30 MIN: CPT | Mod: ,,, | Performed by: FAMILY MEDICINE

## 2022-03-24 PROCEDURE — 84439 T4, FREE: ICD-10-PCS | Mod: ,,, | Performed by: CLINICAL MEDICAL LABORATORY

## 2022-03-24 PROCEDURE — 80305 DRUG TEST PRSMV DIR OPT OBS: CPT | Mod: RHCUB | Performed by: FAMILY MEDICINE

## 2022-03-24 PROCEDURE — 84443 ASSAY THYROID STIM HORMONE: CPT | Mod: ,,, | Performed by: CLINICAL MEDICAL LABORATORY

## 2022-03-24 RX ORDER — OXYCODONE AND ACETAMINOPHEN 10; 325 MG/1; MG/1
1 TABLET ORAL EVERY 6 HOURS PRN
Qty: 120 TABLET | Refills: 0 | Status: SHIPPED | OUTPATIENT
Start: 2022-03-24 | End: 2022-03-24 | Stop reason: SDUPTHER

## 2022-03-24 RX ORDER — OXYCODONE AND ACETAMINOPHEN 10; 325 MG/1; MG/1
1 TABLET ORAL EVERY 6 HOURS PRN
Qty: 120 TABLET | Refills: 0 | Status: SHIPPED | OUTPATIENT
Start: 2022-03-24 | End: 2022-06-16 | Stop reason: SDUPTHER

## 2022-03-24 NOTE — PROGRESS NOTES
Subjective:       Patient ID: Nelida Nicholas is a 66 y.o. female.    Chief Complaint: Medication Refill and Follow-up (3 month)    Doing ok.  Having the TD mouth clicking/smacking which is annoying to her family.  Of note, she has an insulin pump, is brittle Type I with high and low excursions, must check her sugars 8 times a day.  Averages around 50units insulin (novolog) daily.  Needs refills.    Review of Systems   Constitutional: Negative for appetite change, chills, fatigue, fever and unexpected weight change.   Respiratory: Negative for cough and shortness of breath.    Cardiovascular: Negative for chest pain and leg swelling.   Gastrointestinal: Negative for abdominal pain.   Musculoskeletal: Negative for arthralgias.   Integumentary:  Negative for rash.   Neurological: Negative for weakness.   Psychiatric/Behavioral: The patient is not nervous/anxious.          Objective:      Physical Exam  Constitutional:       General: She is not in acute distress.     Appearance: Normal appearance.   Cardiovascular:      Rate and Rhythm: Normal rate and regular rhythm.      Heart sounds: Normal heart sounds.   Pulmonary:      Breath sounds: Normal breath sounds.   Abdominal:      General: Abdomen is flat.      Palpations: Abdomen is soft.   Skin:     General: Skin is warm and dry.   Neurological:      Mental Status: She is alert. Mental status is at baseline.   Psychiatric:         Mood and Affect: Mood normal.         Behavior: Behavior normal.         Thought Content: Thought content normal.         Judgment: Judgment normal.         Assessment:       1. Type 1 diabetes mellitus with hypoglycemia and without coma  Comprehensive Metabolic Panel    Hemoglobin A1C    Comprehensive Metabolic Panel    Hemoglobin A1C   2. Encounter for long-term (current) use of other medications  POCT Urine Drug Screen Presump    Comprehensive Metabolic Panel    Hemoglobin A1C    CBC Auto Differential    Comprehensive Metabolic Panel     Hemoglobin A1C    CBC Auto Differential   3. Nutritional anemia  Comprehensive Metabolic Panel    CBC Auto Differential    Comprehensive Metabolic Panel    CBC Auto Differential   4. Elevated levels of transaminase & lactic acid dehydrogenase  Comprehensive Metabolic Panel    CBC Auto Differential    Comprehensive Metabolic Panel    CBC Auto Differential   5. Hypothyroidism, unspecified type  T4, Free    TSH    T4, Free    TSH   6. Chronic pain syndrome         Plan:       [facility]--novolog bottles 2 per month--24 bottles   repatha to [facility], check on patient assistance program  ingrezza or new med for TD  Keep appt with Dr. Guadarrama   and UDS reviewed, ok for refills.  She must check sugars 8 times a day due to hypo and hyper glycemia.  Please allow for 8 strips daily to test sugars.

## 2022-03-25 LAB
EST. AVERAGE GLUCOSE BLD GHB EST-MCNC: 160 MG/DL
HBA1C MFR BLD HPLC: 7.4 % (ref 4.5–6.6)

## 2022-03-25 RX ORDER — LEVOTHYROXINE SODIUM 150 MCG
150 TABLET ORAL
Qty: 90 TABLET | Refills: 3 | Status: SHIPPED | OUTPATIENT
Start: 2022-03-25 | End: 2022-04-28 | Stop reason: SDUPTHER

## 2022-03-25 RX ORDER — INSULIN ASPART 100 [IU]/ML
INJECTION, SOLUTION INTRAVENOUS; SUBCUTANEOUS
Qty: 24 EACH | Refills: 11 | Status: SHIPPED | OUTPATIENT
Start: 2022-03-25 | End: 2022-03-25

## 2022-03-25 RX ORDER — EVOLOCUMAB 140 MG/ML
140 INJECTION, SOLUTION SUBCUTANEOUS
Qty: 2 EACH | Refills: 3 | Status: SHIPPED | OUTPATIENT
Start: 2022-03-25 | End: 2022-09-28 | Stop reason: SDUPTHER

## 2022-03-31 ENCOUNTER — HOSPITAL ENCOUNTER (OUTPATIENT)
Dept: RADIOLOGY | Facility: HOSPITAL | Age: 67
Discharge: HOME | End: 2022-03-31
Payer: MEDICARE

## 2022-03-31 DIAGNOSIS — Z12.31 SCREENING MAMMOGRAM FOR HIGH-RISK PATIENT: ICD-10-CM

## 2022-03-31 PROCEDURE — 77067 SCR MAMMO BI INCL CAD: CPT | Mod: TC

## 2022-03-31 RX ORDER — FLASH GLUCOSE SENSOR
KIT MISCELLANEOUS
Qty: 1 KIT | Refills: 3 | Status: SHIPPED | OUTPATIENT
Start: 2022-03-31 | End: 2023-07-14 | Stop reason: SDUPTHER

## 2022-04-01 RX ORDER — TRAZODONE HYDROCHLORIDE 150 MG/1
150 TABLET ORAL NIGHTLY
Qty: 90 TABLET | Refills: 3 | Status: SHIPPED | OUTPATIENT
Start: 2022-04-01 | End: 2022-09-28 | Stop reason: SDUPTHER

## 2022-04-01 RX ORDER — MIRABEGRON 50 MG/1
1 TABLET, FILM COATED, EXTENDED RELEASE ORAL DAILY
Qty: 90 TABLET | Refills: 3 | Status: SHIPPED | OUTPATIENT
Start: 2022-04-01 | End: 2022-09-28 | Stop reason: SDUPTHER

## 2022-04-01 RX ORDER — BREXPIPRAZOLE 2 MG/1
1 TABLET ORAL DAILY
Qty: 90 TABLET | Refills: 3 | Status: SHIPPED | OUTPATIENT
Start: 2022-04-01 | End: 2023-03-02

## 2022-04-01 RX ORDER — FLASH GLUCOSE SCANNING READER
EACH MISCELLANEOUS
Qty: 2 EACH | Refills: 11 | Status: SHIPPED | OUTPATIENT
Start: 2022-04-01

## 2022-04-12 RX ORDER — VALBENAZINE 40 MG/1
40 CAPSULE ORAL DAILY
Qty: 30 CAPSULE | Refills: 0 | Status: SHIPPED | OUTPATIENT
Start: 2022-04-12 | End: 2022-10-20 | Stop reason: SDUPTHER

## 2022-04-28 RX ORDER — LEVOTHYROXINE SODIUM 150 MCG
150 TABLET ORAL
Qty: 90 TABLET | Refills: 3 | Status: SHIPPED | OUTPATIENT
Start: 2022-04-28 | End: 2022-09-28 | Stop reason: SDUPTHER

## 2022-05-26 RX ORDER — DEXLANSOPRAZOLE 60 MG/1
CAPSULE, DELAYED RELEASE ORAL
Qty: 180 CAPSULE | Refills: 3 | Status: SHIPPED | OUTPATIENT
Start: 2022-05-26 | End: 2022-05-27 | Stop reason: SDUPTHER

## 2022-05-27 RX ORDER — DEXLANSOPRAZOLE 60 MG/1
CAPSULE, DELAYED RELEASE ORAL
Qty: 180 CAPSULE | Refills: 3 | Status: SHIPPED | OUTPATIENT
Start: 2022-05-27 | End: 2022-06-01 | Stop reason: SDUPTHER

## 2022-05-31 ENCOUNTER — EXTERNAL CHRONIC CARE MANAGEMENT (OUTPATIENT)
Dept: FAMILY MEDICINE | Facility: CLINIC | Age: 67
End: 2022-05-31
Payer: MEDICARE

## 2022-05-31 PROCEDURE — G0511 CCM/BHI BY RHC/FQHC 20MIN MO: HCPCS | Mod: ,,, | Performed by: FAMILY MEDICINE

## 2022-05-31 PROCEDURE — G0511 PR CHRONIC CARE MGMT, RHC OR FQHC ONLY, 20 MINS OR MORE: ICD-10-PCS | Mod: ,,, | Performed by: FAMILY MEDICINE

## 2022-06-01 ENCOUNTER — TELEPHONE (OUTPATIENT)
Dept: FAMILY MEDICINE | Facility: CLINIC | Age: 67
End: 2022-06-01
Payer: MEDICARE

## 2022-06-01 RX ORDER — DEXLANSOPRAZOLE 60 MG/1
60 CAPSULE, DELAYED RELEASE ORAL DAILY
Qty: 90 CAPSULE | Refills: 3 | Status: SHIPPED | OUTPATIENT
Start: 2022-06-01 | End: 2022-09-28 | Stop reason: SDUPTHER

## 2022-06-01 NOTE — TELEPHONE ENCOUNTER
She needs her dexilant sent to [facility] for one daily,her insurance will not pay for two times per day.

## 2022-06-09 ENCOUNTER — TELEPHONE (OUTPATIENT)
Dept: FAMILY MEDICINE | Facility: CLINIC | Age: 67
End: 2022-06-09
Payer: MEDICARE

## 2022-06-09 NOTE — TELEPHONE ENCOUNTER
She has fallen 2-3 times and she is unable to come in for her appt tomorrow for her percocet refill. Wants to know would you do 1 month and she will come in next month.Also asked about getting home health d/t her falls.

## 2022-06-16 RX ORDER — OXYCODONE AND ACETAMINOPHEN 10; 325 MG/1; MG/1
1 TABLET ORAL EVERY 6 HOURS PRN
Qty: 120 TABLET | Refills: 0 | Status: SHIPPED | OUTPATIENT
Start: 2022-06-16 | End: 2022-07-27 | Stop reason: SDUPTHER

## 2022-06-27 RX ORDER — COLESEVELAM 180 1/1
1875 TABLET ORAL 2 TIMES DAILY WITH MEALS
Qty: 540 TABLET | Refills: 3 | Status: SHIPPED | OUTPATIENT
Start: 2022-06-27 | End: 2022-07-27 | Stop reason: SDUPTHER

## 2022-06-30 ENCOUNTER — EXTERNAL CHRONIC CARE MANAGEMENT (OUTPATIENT)
Dept: FAMILY MEDICINE | Facility: CLINIC | Age: 67
End: 2022-06-30
Payer: MEDICARE

## 2022-06-30 PROCEDURE — G0511 PR CHRONIC CARE MGMT, RHC OR FQHC ONLY, 20 MINS OR MORE: ICD-10-PCS | Mod: ,,, | Performed by: FAMILY MEDICINE

## 2022-06-30 PROCEDURE — G0511 CCM/BHI BY RHC/FQHC 20MIN MO: HCPCS | Mod: ,,, | Performed by: FAMILY MEDICINE

## 2022-07-27 ENCOUNTER — OFFICE VISIT (OUTPATIENT)
Dept: FAMILY MEDICINE | Facility: CLINIC | Age: 67
End: 2022-07-27
Payer: MEDICARE

## 2022-07-27 VITALS
BODY MASS INDEX: 28.22 KG/M2 | HEART RATE: 80 BPM | HEIGHT: 59 IN | OXYGEN SATURATION: 97 % | SYSTOLIC BLOOD PRESSURE: 142 MMHG | WEIGHT: 140 LBS | TEMPERATURE: 98 F | RESPIRATION RATE: 16 BRPM | DIASTOLIC BLOOD PRESSURE: 70 MMHG

## 2022-07-27 DIAGNOSIS — E03.9 HYPOTHYROIDISM, UNSPECIFIED TYPE: ICD-10-CM

## 2022-07-27 DIAGNOSIS — D53.9 NUTRITIONAL ANEMIA: ICD-10-CM

## 2022-07-27 DIAGNOSIS — E10.649 TYPE 1 DIABETES MELLITUS WITH HYPOGLYCEMIA AND WITHOUT COMA: ICD-10-CM

## 2022-07-27 DIAGNOSIS — Z79.899 ENCOUNTER FOR LONG-TERM (CURRENT) USE OF OTHER MEDICATIONS: ICD-10-CM

## 2022-07-27 DIAGNOSIS — M81.0 OSTEOPOROSIS, UNSPECIFIED OSTEOPOROSIS TYPE, UNSPECIFIED PATHOLOGICAL FRACTURE PRESENCE: ICD-10-CM

## 2022-07-27 DIAGNOSIS — R74.01 ELEVATED LEVELS OF TRANSAMINASE & LACTIC ACID DEHYDROGENASE: ICD-10-CM

## 2022-07-27 DIAGNOSIS — R74.02 ELEVATED LEVELS OF TRANSAMINASE & LACTIC ACID DEHYDROGENASE: ICD-10-CM

## 2022-07-27 DIAGNOSIS — G89.4 CHRONIC PAIN SYNDROME: Primary | ICD-10-CM

## 2022-07-27 LAB
ALBUMIN SERPL BCP-MCNC: 3.2 G/DL (ref 3.5–5)
ALBUMIN/GLOB SERPL: 0.9 {RATIO}
ALP SERPL-CCNC: 132 U/L (ref 55–142)
ALT SERPL W P-5'-P-CCNC: 27 U/L (ref 13–56)
ANION GAP SERPL CALCULATED.3IONS-SCNC: 13 MMOL/L (ref 7–16)
AST SERPL W P-5'-P-CCNC: 27 U/L (ref 15–37)
BILIRUB SERPL-MCNC: 0.2 MG/DL (ref 0–1.2)
BUN SERPL-MCNC: 9 MG/DL (ref 7–18)
BUN/CREAT SERPL: 12 (ref 6–20)
CALCIUM SERPL-MCNC: 9.3 MG/DL (ref 8.5–10.1)
CHLORIDE SERPL-SCNC: 105 MMOL/L (ref 98–107)
CO2 SERPL-SCNC: 24 MMOL/L (ref 21–32)
CREAT SERPL-MCNC: 0.77 MG/DL (ref 0.55–1.02)
CTP QC/QA: YES
FERRITIN SERPL-MCNC: 96 NG/ML (ref 8–252)
FOLATE SERPL-MCNC: >20 NG/ML (ref 3.1–17.5)
GLOBULIN SER-MCNC: 3.4 G/DL (ref 2–4)
GLUCOSE SERPL-MCNC: 265 MG/DL (ref 74–106)
IRON SATN MFR SERPL: 24 % (ref 14–50)
IRON SERPL-MCNC: 59 ΜG/DL (ref 50–170)
POC (AMP) AMPHETAMINE: NEGATIVE
POC (BAR) BARBITURATES: NEGATIVE
POC (BUP) BUPRENORPHINE: NEGATIVE
POC (BZO) BENZODIAZEPINES: NEGATIVE
POC (COC) COCAINE: NEGATIVE
POC (MDMA) METHYLENEDIOXYMETHAMPHETAMINE 3,4: NEGATIVE
POC (MET) METHAMPHETAMINE: NEGATIVE
POC (MOP) OPIATES: ABNORMAL
POC (MTD) METHADONE: NEGATIVE
POC (OXY) OXYCODONE: ABNORMAL
POC (PCP) PHENCYCLIDINE: NEGATIVE
POC (TCA) TRICYCLIC ANTIDEPRESSANTS: ABNORMAL
POC TEMPERATURE (URINE): 92
POC THC: NEGATIVE
POTASSIUM SERPL-SCNC: 4.2 MMOL/L (ref 3.5–5.1)
PROT SERPL-MCNC: 6.6 G/DL (ref 6.4–8.2)
SODIUM SERPL-SCNC: 138 MMOL/L (ref 136–145)
T4 FREE SERPL-MCNC: 1.31 NG/DL (ref 0.76–1.46)
TIBC SERPL-MCNC: 245 ΜG/DL (ref 250–450)
TSH SERPL DL<=0.005 MIU/L-ACNC: 0.13 UIU/ML (ref 0.36–3.74)
VIT B12 SERPL-MCNC: 1078 PG/ML (ref 193–986)

## 2022-07-27 PROCEDURE — 83540 IRON AND TIBC: ICD-10-PCS | Mod: ,,, | Performed by: CLINICAL MEDICAL LABORATORY

## 2022-07-27 PROCEDURE — 80053 COMPREHEN METABOLIC PANEL: CPT | Mod: ,,, | Performed by: CLINICAL MEDICAL LABORATORY

## 2022-07-27 PROCEDURE — 84443 ASSAY THYROID STIM HORMONE: CPT | Mod: ,,, | Performed by: CLINICAL MEDICAL LABORATORY

## 2022-07-27 PROCEDURE — 83550 IRON AND TIBC: ICD-10-PCS | Mod: ,,, | Performed by: CLINICAL MEDICAL LABORATORY

## 2022-07-27 PROCEDURE — 83540 ASSAY OF IRON: CPT | Mod: ,,, | Performed by: CLINICAL MEDICAL LABORATORY

## 2022-07-27 PROCEDURE — 96372 THER/PROPH/DIAG INJ SC/IM: CPT | Mod: ,,, | Performed by: FAMILY MEDICINE

## 2022-07-27 PROCEDURE — 99214 OFFICE O/P EST MOD 30 MIN: CPT | Mod: ,,, | Performed by: FAMILY MEDICINE

## 2022-07-27 PROCEDURE — 84439 ASSAY OF FREE THYROXINE: CPT | Mod: ,,, | Performed by: CLINICAL MEDICAL LABORATORY

## 2022-07-27 PROCEDURE — 82607 VITAMIN B-12: CPT | Mod: ,,, | Performed by: CLINICAL MEDICAL LABORATORY

## 2022-07-27 PROCEDURE — 82728 FERRITIN: ICD-10-PCS | Mod: ,,, | Performed by: CLINICAL MEDICAL LABORATORY

## 2022-07-27 PROCEDURE — 80053 COMPREHENSIVE METABOLIC PANEL: ICD-10-PCS | Mod: ,,, | Performed by: CLINICAL MEDICAL LABORATORY

## 2022-07-27 PROCEDURE — 84443 TSH: ICD-10-PCS | Mod: ,,, | Performed by: CLINICAL MEDICAL LABORATORY

## 2022-07-27 PROCEDURE — 80305 DRUG TEST PRSMV DIR OPT OBS: CPT | Mod: RHCUB | Performed by: FAMILY MEDICINE

## 2022-07-27 PROCEDURE — 96372 PR INJECTION,THERAP/PROPH/DIAG2ST, IM OR SUBCUT: ICD-10-PCS | Mod: ,,, | Performed by: FAMILY MEDICINE

## 2022-07-27 PROCEDURE — 83550 IRON BINDING TEST: CPT | Mod: ,,, | Performed by: CLINICAL MEDICAL LABORATORY

## 2022-07-27 PROCEDURE — 82746 VITAMIN B12/FOLATE, SERUM PANEL: ICD-10-PCS | Mod: ,,, | Performed by: CLINICAL MEDICAL LABORATORY

## 2022-07-27 PROCEDURE — 84439 T4, FREE: ICD-10-PCS | Mod: ,,, | Performed by: CLINICAL MEDICAL LABORATORY

## 2022-07-27 PROCEDURE — 99214 PR OFFICE/OUTPT VISIT, EST, LEVL IV, 30-39 MIN: ICD-10-PCS | Mod: ,,, | Performed by: FAMILY MEDICINE

## 2022-07-27 PROCEDURE — 82728 ASSAY OF FERRITIN: CPT | Mod: ,,, | Performed by: CLINICAL MEDICAL LABORATORY

## 2022-07-27 PROCEDURE — 82746 ASSAY OF FOLIC ACID SERUM: CPT | Mod: ,,, | Performed by: CLINICAL MEDICAL LABORATORY

## 2022-07-27 PROCEDURE — 82607 VITAMIN B12/FOLATE, SERUM PANEL: ICD-10-PCS | Mod: ,,, | Performed by: CLINICAL MEDICAL LABORATORY

## 2022-07-27 RX ORDER — KETOCONAZOLE 20 MG/G
CREAM TOPICAL DAILY
Qty: 60 G | Refills: 0 | Status: SHIPPED | OUTPATIENT
Start: 2022-07-27 | End: 2023-03-30

## 2022-07-27 RX ORDER — OXYCODONE AND ACETAMINOPHEN 10; 325 MG/1; MG/1
1 TABLET ORAL EVERY 6 HOURS PRN
Qty: 120 TABLET | Refills: 0 | Status: SHIPPED | OUTPATIENT
Start: 2022-07-27 | End: 2022-07-27 | Stop reason: SDUPTHER

## 2022-07-27 RX ORDER — GABAPENTIN 100 MG/1
100 CAPSULE ORAL 2 TIMES DAILY
Qty: 180 CAPSULE | Refills: 3 | Status: SHIPPED | OUTPATIENT
Start: 2022-07-27 | End: 2022-09-28 | Stop reason: SDUPTHER

## 2022-07-27 RX ORDER — OXYCODONE AND ACETAMINOPHEN 10; 325 MG/1; MG/1
1 TABLET ORAL EVERY 6 HOURS PRN
Qty: 120 TABLET | Refills: 0 | Status: SHIPPED | OUTPATIENT
Start: 2022-07-27 | End: 2022-10-20 | Stop reason: SDUPTHER

## 2022-07-27 RX ORDER — SILVER SULFADIAZINE 10 G/1000G
CREAM TOPICAL DAILY
Qty: 50 G | Refills: 0 | Status: SHIPPED | OUTPATIENT
Start: 2022-07-27 | End: 2023-05-25

## 2022-07-27 RX ORDER — COLESEVELAM 180 1/1
1875 TABLET ORAL 2 TIMES DAILY WITH MEALS
Qty: 540 TABLET | Refills: 3 | Status: SHIPPED | OUTPATIENT
Start: 2022-07-27 | End: 2022-09-28 | Stop reason: SDUPTHER

## 2022-07-29 DIAGNOSIS — D53.9 NUTRITIONAL ANEMIA: Primary | ICD-10-CM

## 2022-07-29 DIAGNOSIS — E10.649 TYPE 1 DIABETES MELLITUS WITH HYPOGLYCEMIA AND WITHOUT COMA: ICD-10-CM

## 2022-07-29 DIAGNOSIS — Z79.899 ENCOUNTER FOR LONG-TERM (CURRENT) USE OF OTHER MEDICATIONS: ICD-10-CM

## 2022-07-31 ENCOUNTER — EXTERNAL CHRONIC CARE MANAGEMENT (OUTPATIENT)
Dept: FAMILY MEDICINE | Facility: CLINIC | Age: 67
End: 2022-07-31
Payer: MEDICARE

## 2022-07-31 PROCEDURE — G0511 PR CHRONIC CARE MGMT, RHC OR FQHC ONLY, 20 MINS OR MORE: ICD-10-PCS | Mod: ,,, | Performed by: FAMILY MEDICINE

## 2022-07-31 PROCEDURE — G0511 CCM/BHI BY RHC/FQHC 20MIN MO: HCPCS | Mod: ,,, | Performed by: FAMILY MEDICINE

## 2022-08-10 RX ORDER — GABAPENTIN 800 MG/1
800 TABLET ORAL NIGHTLY
Qty: 90 TABLET | Refills: 3 | Status: SHIPPED | OUTPATIENT
Start: 2022-08-10 | End: 2022-09-28 | Stop reason: SDUPTHER

## 2022-08-11 ENCOUNTER — TELEPHONE (OUTPATIENT)
Dept: FAMILY MEDICINE | Facility: CLINIC | Age: 67
End: 2022-08-11
Payer: MEDICARE

## 2022-08-11 RX ORDER — FUROSEMIDE 20 MG/1
20 TABLET ORAL DAILY
Qty: 30 TABLET | Refills: 11 | Status: SHIPPED | OUTPATIENT
Start: 2022-08-11 | End: 2023-03-02 | Stop reason: SDUPTHER

## 2022-08-18 ENCOUNTER — TELEPHONE (OUTPATIENT)
Dept: FAMILY MEDICINE | Facility: CLINIC | Age: 67
End: 2022-08-18
Payer: MEDICARE

## 2022-08-31 ENCOUNTER — EXTERNAL CHRONIC CARE MANAGEMENT (OUTPATIENT)
Dept: FAMILY MEDICINE | Facility: CLINIC | Age: 67
End: 2022-08-31
Payer: MEDICARE

## 2022-08-31 PROCEDURE — G0511 PR CHRONIC CARE MGMT, RHC OR FQHC ONLY, 20 MINS OR MORE: ICD-10-PCS | Mod: ,,, | Performed by: FAMILY MEDICINE

## 2022-08-31 PROCEDURE — G0511 CCM/BHI BY RHC/FQHC 20MIN MO: HCPCS | Mod: ,,, | Performed by: FAMILY MEDICINE

## 2022-09-14 DIAGNOSIS — Z79.899 ENCOUNTER FOR LONG-TERM (CURRENT) USE OF OTHER MEDICATIONS: Primary | ICD-10-CM

## 2022-09-14 DIAGNOSIS — E10.649 TYPE 1 DIABETES MELLITUS WITH HYPOGLYCEMIA AND WITHOUT COMA: ICD-10-CM

## 2022-09-23 ENCOUNTER — OFFICE VISIT (OUTPATIENT)
Dept: FAMILY MEDICINE | Facility: CLINIC | Age: 67
End: 2022-09-23
Payer: MEDICARE

## 2022-09-23 VITALS
HEIGHT: 59 IN | TEMPERATURE: 98 F | WEIGHT: 144 LBS | SYSTOLIC BLOOD PRESSURE: 130 MMHG | BODY MASS INDEX: 29.03 KG/M2 | DIASTOLIC BLOOD PRESSURE: 72 MMHG | HEART RATE: 69 BPM | RESPIRATION RATE: 16 BRPM | OXYGEN SATURATION: 99 %

## 2022-09-23 DIAGNOSIS — E10.649 TYPE 1 DIABETES MELLITUS WITH HYPOGLYCEMIA AND WITHOUT COMA: ICD-10-CM

## 2022-09-23 DIAGNOSIS — D53.9 NUTRITIONAL ANEMIA: ICD-10-CM

## 2022-09-23 DIAGNOSIS — L98.9 SKIN LESION: Primary | ICD-10-CM

## 2022-09-23 DIAGNOSIS — Z23 NEED FOR IMMUNIZATION AGAINST INFLUENZA: ICD-10-CM

## 2022-09-23 DIAGNOSIS — Z79.899 ENCOUNTER FOR LONG-TERM (CURRENT) USE OF OTHER MEDICATIONS: ICD-10-CM

## 2022-09-23 LAB
ANION GAP SERPL CALCULATED.3IONS-SCNC: 10 MMOL/L (ref 7–16)
BASOPHILS # BLD AUTO: 0.04 K/UL (ref 0–0.2)
BASOPHILS NFR BLD AUTO: 0.5 % (ref 0–1)
BUN SERPL-MCNC: 14 MG/DL (ref 7–18)
BUN/CREAT SERPL: 14 (ref 6–20)
CALCIUM SERPL-MCNC: 8.8 MG/DL (ref 8.5–10.1)
CHLORIDE SERPL-SCNC: 107 MMOL/L (ref 98–107)
CO2 SERPL-SCNC: 25 MMOL/L (ref 21–32)
CREAT SERPL-MCNC: 0.99 MG/DL (ref 0.55–1.02)
DIFFERENTIAL METHOD BLD: ABNORMAL
EGFR (NO RACE VARIABLE) (RUSH/TITUS): 62 ML/MIN/1.73M²
EOSINOPHIL # BLD AUTO: 0.33 K/UL (ref 0–0.5)
EOSINOPHIL NFR BLD AUTO: 3.9 % (ref 1–4)
ERYTHROCYTE [DISTWIDTH] IN BLOOD BY AUTOMATED COUNT: 13 % (ref 11.5–14.5)
EST. AVERAGE GLUCOSE BLD GHB EST-MCNC: 144 MG/DL
GLUCOSE SERPL-MCNC: 278 MG/DL (ref 74–106)
HBA1C MFR BLD HPLC: 6.9 % (ref 4.5–6.6)
HCT VFR BLD AUTO: 32.3 % (ref 38–47)
HGB BLD-MCNC: 10.8 G/DL (ref 12–16)
IMM GRANULOCYTES # BLD AUTO: 0.03 K/UL (ref 0–0.04)
IMM GRANULOCYTES NFR BLD: 0.4 % (ref 0–0.4)
LYMPHOCYTES # BLD AUTO: 1.98 K/UL (ref 1–4.8)
LYMPHOCYTES NFR BLD AUTO: 23.4 % (ref 27–41)
MCH RBC QN AUTO: 30.9 PG (ref 27–31)
MCHC RBC AUTO-ENTMCNC: 33.4 G/DL (ref 32–36)
MCV RBC AUTO: 92.6 FL (ref 80–96)
MONOCYTES # BLD AUTO: 0.48 K/UL (ref 0–0.8)
MONOCYTES NFR BLD AUTO: 5.7 % (ref 2–6)
MPC BLD CALC-MCNC: 11.2 FL (ref 9.4–12.4)
NEUTROPHILS # BLD AUTO: 5.59 K/UL (ref 1.8–7.7)
NEUTROPHILS NFR BLD AUTO: 66.1 % (ref 53–65)
NRBC # BLD AUTO: 0 X10E3/UL
NRBC, AUTO (.00): 0 %
PLATELET # BLD AUTO: 237 K/UL (ref 150–400)
POTASSIUM SERPL-SCNC: 4.5 MMOL/L (ref 3.5–5.1)
RBC # BLD AUTO: 3.49 M/UL (ref 4.2–5.4)
SODIUM SERPL-SCNC: 137 MMOL/L (ref 136–145)
WBC # BLD AUTO: 8.45 K/UL (ref 4.5–11)

## 2022-09-23 PROCEDURE — 99213 OFFICE O/P EST LOW 20 MIN: CPT | Mod: ,,, | Performed by: FAMILY MEDICINE

## 2022-09-23 PROCEDURE — G0008 ADMIN INFLUENZA VIRUS VAC: HCPCS | Mod: ,,, | Performed by: FAMILY MEDICINE

## 2022-09-23 PROCEDURE — 83036 HEMOGLOBIN A1C: ICD-10-PCS | Mod: ,,, | Performed by: CLINICAL MEDICAL LABORATORY

## 2022-09-23 PROCEDURE — 85025 CBC WITH DIFFERENTIAL: ICD-10-PCS | Mod: ,,, | Performed by: CLINICAL MEDICAL LABORATORY

## 2022-09-23 PROCEDURE — 99213 PR OFFICE/OUTPT VISIT, EST, LEVL III, 20-29 MIN: ICD-10-PCS | Mod: ,,, | Performed by: FAMILY MEDICINE

## 2022-09-23 PROCEDURE — 80048 BASIC METABOLIC PANEL: ICD-10-PCS | Mod: ,,, | Performed by: CLINICAL MEDICAL LABORATORY

## 2022-09-23 PROCEDURE — 80048 BASIC METABOLIC PNL TOTAL CA: CPT | Mod: ,,, | Performed by: CLINICAL MEDICAL LABORATORY

## 2022-09-23 PROCEDURE — 90694 VACC AIIV4 NO PRSRV 0.5ML IM: CPT | Mod: ,,, | Performed by: FAMILY MEDICINE

## 2022-09-23 PROCEDURE — 83036 HEMOGLOBIN GLYCOSYLATED A1C: CPT | Mod: ,,, | Performed by: CLINICAL MEDICAL LABORATORY

## 2022-09-23 PROCEDURE — 85025 COMPLETE CBC W/AUTO DIFF WBC: CPT | Mod: ,,, | Performed by: CLINICAL MEDICAL LABORATORY

## 2022-09-23 PROCEDURE — 90694 FLU VACCINE - QUADRIVALENT - ADJUVANTED: ICD-10-PCS | Mod: ,,, | Performed by: FAMILY MEDICINE

## 2022-09-23 PROCEDURE — G0008 FLU VACCINE - QUADRIVALENT - ADJUVANTED: ICD-10-PCS | Mod: ,,, | Performed by: FAMILY MEDICINE

## 2022-09-23 NOTE — PROGRESS NOTES
Subjective:       Patient ID: Nelida Nicholas is a 67 y.o. female.    Chief Complaint: Skin Check (Has a sore on the left buttock.)    Still has skin lesion in gluteal cleft.  Tried the nizoral only a couple days and then went back to the silvadene cream.  Not healing and is very tender.    Review of Systems   Constitutional:  Negative for appetite change, chills, fatigue, fever and unexpected weight change.   Respiratory:  Negative for cough and shortness of breath.    Cardiovascular:  Negative for chest pain and leg swelling.   Gastrointestinal:  Negative for abdominal pain.   Musculoskeletal:  Negative for arthralgias.   Integumentary:  Positive for rash and mole/lesion.   Neurological:  Negative for weakness.   Psychiatric/Behavioral:  The patient is not nervous/anxious.        Objective:      Physical Exam  Constitutional:       General: She is not in acute distress.     Appearance: Normal appearance.   Cardiovascular:      Rate and Rhythm: Normal rate and regular rhythm.      Heart sounds: Normal heart sounds.   Pulmonary:      Breath sounds: Normal breath sounds.   Abdominal:      General: Abdomen is flat.      Palpations: Abdomen is soft.   Skin:     General: Skin is warm and dry.      Findings: Lesion present.      Comments: Left gluteal cleft with large (approx 4 cm) erythematous macular and scaly skin change with tenderness but no surrounding erythema and no signs of abscess/cellulitis.  Has smaller lesion on right buttock/gluteal cleft   Neurological:      Mental Status: She is alert. Mental status is at baseline.   Psychiatric:         Mood and Affect: Mood normal.         Behavior: Behavior normal.         Thought Content: Thought content normal.         Judgment: Judgment normal.       Assessment:       1. Skin lesion  Ambulatory referral/consult to Dermatology      2. Type 1 diabetes mellitus with hypoglycemia and without coma  Basic Metabolic Panel    Hemoglobin A1C    Basic Metabolic Panel     Hemoglobin A1C      3. Encounter for long-term (current) use of other medications  Basic Metabolic Panel    CBC Auto Differential    Hemoglobin A1C    Basic Metabolic Panel    CBC Auto Differential    Hemoglobin A1C      4. Nutritional anemia  Basic Metabolic Panel    CBC Auto Differential    Basic Metabolic Panel    CBC Auto Differential      5. Need for immunization against influenza  Influenza (FLUAD) - Quadrivalent (Adjuvanted) *Preferred* (65+) (PF)          Plan:       Call Alma Kirkpatrick (brother uses her)-skin lesion that won't heal, may need biopsy  Labs  Flu vacc

## 2022-09-26 PROBLEM — L98.9 SKIN LESION: Status: ACTIVE | Noted: 2022-09-26

## 2022-09-28 ENCOUNTER — TELEPHONE (OUTPATIENT)
Dept: FAMILY MEDICINE | Facility: CLINIC | Age: 67
End: 2022-09-28
Payer: MEDICARE

## 2022-09-28 RX ORDER — AZELASTINE 1 MG/ML
SPRAY, METERED NASAL
Qty: 90 ML | Refills: 3 | Status: SHIPPED | OUTPATIENT
Start: 2022-09-28 | End: 2022-09-28 | Stop reason: SDUPTHER

## 2022-09-28 RX ORDER — TRAZODONE HYDROCHLORIDE 150 MG/1
150 TABLET ORAL NIGHTLY
Qty: 90 TABLET | Refills: 3 | Status: SHIPPED | OUTPATIENT
Start: 2022-09-28 | End: 2023-03-02 | Stop reason: SDUPTHER

## 2022-09-28 RX ORDER — AZELASTINE 1 MG/ML
SPRAY, METERED NASAL
Qty: 90 ML | Refills: 3 | Status: SHIPPED | OUTPATIENT
Start: 2022-09-28 | End: 2023-03-02 | Stop reason: SDUPTHER

## 2022-09-28 RX ORDER — CETIRIZINE HYDROCHLORIDE 10 MG/1
10 TABLET ORAL DAILY
Qty: 90 TABLET | Refills: 3 | Status: SHIPPED | OUTPATIENT
Start: 2022-09-28 | End: 2023-03-02 | Stop reason: SDUPTHER

## 2022-09-28 RX ORDER — BENZONATATE 100 MG/1
100 CAPSULE ORAL 3 TIMES DAILY PRN
Qty: 90 CAPSULE | Refills: 5 | Status: SHIPPED | OUTPATIENT
Start: 2022-09-28 | End: 2022-10-21 | Stop reason: SDUPTHER

## 2022-09-28 RX ORDER — GABAPENTIN 100 MG/1
100 CAPSULE ORAL 2 TIMES DAILY
Qty: 180 CAPSULE | Refills: 3 | Status: SHIPPED | OUTPATIENT
Start: 2022-09-28 | End: 2023-03-02 | Stop reason: SDUPTHER

## 2022-09-28 RX ORDER — FLUTICASONE PROPIONATE 50 MCG
2 SPRAY, SUSPENSION (ML) NASAL DAILY
Qty: 48 G | Refills: 3 | Status: SHIPPED | OUTPATIENT
Start: 2022-09-28 | End: 2023-03-02 | Stop reason: SDUPTHER

## 2022-09-28 RX ORDER — HYDROXYZINE PAMOATE 25 MG/1
25 CAPSULE ORAL EVERY 6 HOURS PRN
Qty: 360 CAPSULE | Refills: 3 | Status: SHIPPED | OUTPATIENT
Start: 2022-09-28 | End: 2023-03-02 | Stop reason: SDUPTHER

## 2022-09-28 RX ORDER — DULOXETIN HYDROCHLORIDE 60 MG/1
60 CAPSULE, DELAYED RELEASE ORAL DAILY
Qty: 90 CAPSULE | Refills: 3 | Status: SHIPPED | OUTPATIENT
Start: 2022-09-28 | End: 2023-03-02 | Stop reason: SDUPTHER

## 2022-09-28 RX ORDER — MONTELUKAST SODIUM 10 MG/1
10 TABLET ORAL DAILY
Qty: 90 TABLET | Refills: 3 | Status: SHIPPED | OUTPATIENT
Start: 2022-09-28 | End: 2023-03-02 | Stop reason: SDUPTHER

## 2022-09-28 RX ORDER — LINACLOTIDE 72 UG/1
72 CAPSULE, GELATIN COATED ORAL DAILY
Qty: 90 CAPSULE | Refills: 3 | Status: SHIPPED | OUTPATIENT
Start: 2022-09-28 | End: 2023-03-02 | Stop reason: SDUPTHER

## 2022-09-28 RX ORDER — MELOXICAM 7.5 MG/1
7.5 TABLET ORAL DAILY
Qty: 90 TABLET | Refills: 3 | Status: SHIPPED | OUTPATIENT
Start: 2022-09-28 | End: 2023-03-02 | Stop reason: SDUPTHER

## 2022-09-28 RX ORDER — CARVEDILOL 12.5 MG/1
TABLET ORAL
Qty: 180 TABLET | Refills: 3 | Status: SHIPPED | OUTPATIENT
Start: 2022-09-28 | End: 2023-03-02 | Stop reason: SDUPTHER

## 2022-09-28 RX ORDER — DEXLANSOPRAZOLE 60 MG/1
60 CAPSULE, DELAYED RELEASE ORAL DAILY
Qty: 90 CAPSULE | Refills: 3 | Status: SHIPPED | OUTPATIENT
Start: 2022-09-28 | End: 2023-03-02 | Stop reason: SDUPTHER

## 2022-09-28 RX ORDER — TIZANIDINE 4 MG/1
TABLET ORAL
Qty: 360 TABLET | Refills: 3 | Status: SHIPPED | OUTPATIENT
Start: 2022-09-28 | End: 2023-03-02 | Stop reason: SDUPTHER

## 2022-09-28 RX ORDER — EVOLOCUMAB 140 MG/ML
140 INJECTION, SOLUTION SUBCUTANEOUS
Qty: 6 EACH | Refills: 3 | Status: SHIPPED | OUTPATIENT
Start: 2022-09-28 | End: 2023-03-02 | Stop reason: SDUPTHER

## 2022-09-28 RX ORDER — MIRABEGRON 50 MG/1
1 TABLET, FILM COATED, EXTENDED RELEASE ORAL DAILY
Qty: 90 TABLET | Refills: 3 | Status: SHIPPED | OUTPATIENT
Start: 2022-09-28 | End: 2023-03-02 | Stop reason: SDUPTHER

## 2022-09-28 RX ORDER — LOSARTAN POTASSIUM 100 MG/1
100 TABLET ORAL DAILY
Qty: 90 TABLET | Refills: 3 | Status: SHIPPED | OUTPATIENT
Start: 2022-09-28 | End: 2023-03-02 | Stop reason: SDUPTHER

## 2022-09-28 RX ORDER — GABAPENTIN 800 MG/1
800 TABLET ORAL NIGHTLY
Qty: 90 TABLET | Refills: 3 | Status: SHIPPED | OUTPATIENT
Start: 2022-09-28 | End: 2023-03-02 | Stop reason: SDUPTHER

## 2022-09-28 RX ORDER — COLESEVELAM 180 1/1
TABLET ORAL
Qty: 270 TABLET | Refills: 3 | Status: SHIPPED | OUTPATIENT
Start: 2022-09-28 | End: 2023-03-02 | Stop reason: SDUPTHER

## 2022-09-28 RX ORDER — LEVOTHYROXINE SODIUM 150 MCG
150 TABLET ORAL
Qty: 90 TABLET | Refills: 3 | Status: SHIPPED | OUTPATIENT
Start: 2022-09-28 | End: 2023-03-02 | Stop reason: SDUPTHER

## 2022-09-28 NOTE — TELEPHONE ENCOUNTER
Called stating that [facility] closed yesterday and she needs all of her meds sent to [facility].

## 2022-09-30 ENCOUNTER — EXTERNAL CHRONIC CARE MANAGEMENT (OUTPATIENT)
Dept: FAMILY MEDICINE | Facility: CLINIC | Age: 67
End: 2022-09-30
Payer: MEDICARE

## 2022-09-30 PROCEDURE — G0511 CCM/BHI BY RHC/FQHC 20MIN MO: HCPCS | Mod: ,,, | Performed by: FAMILY MEDICINE

## 2022-09-30 PROCEDURE — G0511 PR CHRONIC CARE MGMT, RHC OR FQHC ONLY, 20 MINS OR MORE: ICD-10-PCS | Mod: ,,, | Performed by: FAMILY MEDICINE

## 2022-10-12 ENCOUNTER — TELEPHONE (OUTPATIENT)
Dept: FAMILY MEDICINE | Facility: CLINIC | Age: 67
End: 2022-10-12
Payer: MEDICARE

## 2022-10-12 RX ORDER — ATOGEPANT 60 MG/1
60 TABLET ORAL DAILY
Qty: 30 TABLET | Refills: 3 | Status: SHIPPED | OUTPATIENT
Start: 2022-10-12 | End: 2022-10-19 | Stop reason: SDUPTHER

## 2022-10-12 NOTE — TELEPHONE ENCOUNTER
She called stating that  told her that medicare would no longer cover the botox injections for her migraines. She wanted to try some of the new meds for migraines,wants them sent to [facility].

## 2022-10-19 RX ORDER — ATOGEPANT 60 MG/1
60 TABLET ORAL DAILY
Qty: 30 TABLET | Refills: 3 | Status: SHIPPED | OUTPATIENT
Start: 2022-10-19 | End: 2023-06-08

## 2022-10-20 ENCOUNTER — OFFICE VISIT (OUTPATIENT)
Dept: FAMILY MEDICINE | Facility: CLINIC | Age: 67
End: 2022-10-20
Payer: MEDICARE

## 2022-10-20 VITALS
DIASTOLIC BLOOD PRESSURE: 74 MMHG | OXYGEN SATURATION: 96 % | SYSTOLIC BLOOD PRESSURE: 148 MMHG | BODY MASS INDEX: 29.23 KG/M2 | HEIGHT: 59 IN | HEART RATE: 69 BPM | RESPIRATION RATE: 16 BRPM | TEMPERATURE: 98 F | WEIGHT: 145 LBS

## 2022-10-20 DIAGNOSIS — D53.9 NUTRITIONAL ANEMIA: ICD-10-CM

## 2022-10-20 DIAGNOSIS — E10.649 TYPE 1 DIABETES MELLITUS WITH HYPOGLYCEMIA AND WITHOUT COMA: Primary | ICD-10-CM

## 2022-10-20 DIAGNOSIS — E03.9 HYPOTHYROIDISM, UNSPECIFIED TYPE: ICD-10-CM

## 2022-10-20 DIAGNOSIS — Z79.899 ENCOUNTER FOR LONG-TERM (CURRENT) USE OF OTHER MEDICATIONS: ICD-10-CM

## 2022-10-20 DIAGNOSIS — K59.00 CONSTIPATION, UNSPECIFIED CONSTIPATION TYPE: ICD-10-CM

## 2022-10-20 DIAGNOSIS — T14.8XXA OPEN WOUND OF SKIN: ICD-10-CM

## 2022-10-20 DIAGNOSIS — M81.0 OSTEOPOROSIS, UNSPECIFIED OSTEOPOROSIS TYPE, UNSPECIFIED PATHOLOGICAL FRACTURE PRESENCE: ICD-10-CM

## 2022-10-20 DIAGNOSIS — E78.2 MIXED HYPERLIPIDEMIA: ICD-10-CM

## 2022-10-20 DIAGNOSIS — R74.02 ELEVATED LEVELS OF TRANSAMINASE & LACTIC ACID DEHYDROGENASE: ICD-10-CM

## 2022-10-20 DIAGNOSIS — Z12.11 COLON CANCER SCREENING: ICD-10-CM

## 2022-10-20 DIAGNOSIS — R74.01 ELEVATED LEVELS OF TRANSAMINASE & LACTIC ACID DEHYDROGENASE: ICD-10-CM

## 2022-10-20 LAB
25(OH)D3 SERPL-MCNC: 64.1 NG/ML
ALBUMIN SERPL BCP-MCNC: 3.7 G/DL (ref 3.5–5)
ALBUMIN/GLOB SERPL: 0.9 {RATIO}
ALP SERPL-CCNC: 103 U/L (ref 55–142)
ALT SERPL W P-5'-P-CCNC: 30 U/L (ref 13–56)
ANION GAP SERPL CALCULATED.3IONS-SCNC: 12 MMOL/L (ref 7–16)
AST SERPL W P-5'-P-CCNC: 42 U/L (ref 15–37)
BASOPHILS # BLD AUTO: 0.06 K/UL (ref 0–0.2)
BASOPHILS NFR BLD AUTO: 0.7 % (ref 0–1)
BILIRUB SERPL-MCNC: 0.6 MG/DL (ref ?–1.2)
BUN SERPL-MCNC: 13 MG/DL (ref 7–18)
BUN/CREAT SERPL: 14 (ref 6–20)
CALCIUM SERPL-MCNC: 9.5 MG/DL (ref 8.5–10.1)
CHLORIDE SERPL-SCNC: 98 MMOL/L (ref 98–107)
CHOLEST SERPL-MCNC: 191 MG/DL (ref 0–200)
CHOLEST/HDLC SERPL: 4.2 {RATIO}
CO2 SERPL-SCNC: 28 MMOL/L (ref 21–32)
CREAT SERPL-MCNC: 0.91 MG/DL (ref 0.55–1.02)
CTP QC/QA: YES
DIFFERENTIAL METHOD BLD: ABNORMAL
EGFR (NO RACE VARIABLE) (RUSH/TITUS): 69 ML/MIN/1.73M²
EOSINOPHIL # BLD AUTO: 0.38 K/UL (ref 0–0.5)
EOSINOPHIL NFR BLD AUTO: 4.7 % (ref 1–4)
ERYTHROCYTE [DISTWIDTH] IN BLOOD BY AUTOMATED COUNT: 12.3 % (ref 11.5–14.5)
EST. AVERAGE GLUCOSE BLD GHB EST-MCNC: 150 MG/DL
GLOBULIN SER-MCNC: 4 G/DL (ref 2–4)
GLUCOSE SERPL-MCNC: 74 MG/DL (ref 74–106)
HBA1C MFR BLD HPLC: 7.1 % (ref 4.5–6.6)
HCT VFR BLD AUTO: 37 % (ref 38–47)
HDLC SERPL-MCNC: 46 MG/DL (ref 40–60)
HGB BLD-MCNC: 12.6 G/DL (ref 12–16)
IMM GRANULOCYTES # BLD AUTO: 0.05 K/UL (ref 0–0.04)
IMM GRANULOCYTES NFR BLD: 0.6 % (ref 0–0.4)
LDLC SERPL CALC-MCNC: 117 MG/DL
LDLC/HDLC SERPL: 2.5 {RATIO}
LYMPHOCYTES # BLD AUTO: 1.99 K/UL (ref 1–4.8)
LYMPHOCYTES NFR BLD AUTO: 24.8 % (ref 27–41)
MCH RBC QN AUTO: 31.7 PG (ref 27–31)
MCHC RBC AUTO-ENTMCNC: 34.1 G/DL (ref 32–36)
MCV RBC AUTO: 93 FL (ref 80–96)
MONOCYTES # BLD AUTO: 0.49 K/UL (ref 0–0.8)
MONOCYTES NFR BLD AUTO: 6.1 % (ref 2–6)
MPC BLD CALC-MCNC: 11.5 FL (ref 9.4–12.4)
NEUTROPHILS # BLD AUTO: 5.05 K/UL (ref 1.8–7.7)
NEUTROPHILS NFR BLD AUTO: 63.1 % (ref 53–65)
NONHDLC SERPL-MCNC: 145 MG/DL
NRBC # BLD AUTO: 0 X10E3/UL
NRBC, AUTO (.00): 0 %
PLATELET # BLD AUTO: 229 K/UL (ref 150–400)
POC (AMP) AMPHETAMINE: NEGATIVE
POC (BAR) BARBITURATES: NEGATIVE
POC (BUP) BUPRENORPHINE: NEGATIVE
POC (BZO) BENZODIAZEPINES: NEGATIVE
POC (COC) COCAINE: NEGATIVE
POC (MDMA) METHYLENEDIOXYMETHAMPHETAMINE 3,4: NEGATIVE
POC (MET) METHAMPHETAMINE: NEGATIVE
POC (MOP) OPIATES: NEGATIVE
POC (MTD) METHADONE: NEGATIVE
POC (OXY) OXYCODONE: ABNORMAL
POC (PCP) PHENCYCLIDINE: NEGATIVE
POC (TCA) TRICYCLIC ANTIDEPRESSANTS: ABNORMAL
POC TEMPERATURE (URINE): 92
POC THC: NEGATIVE
POTASSIUM SERPL-SCNC: 5.3 MMOL/L (ref 3.5–5.1)
PROT SERPL-MCNC: 7.7 G/DL (ref 6.4–8.2)
RBC # BLD AUTO: 3.98 M/UL (ref 4.2–5.4)
SODIUM SERPL-SCNC: 133 MMOL/L (ref 136–145)
T4 FREE SERPL-MCNC: 1.03 NG/DL (ref 0.76–1.46)
TRIGL SERPL-MCNC: 140 MG/DL (ref 35–150)
TSH SERPL DL<=0.005 MIU/L-ACNC: 26.5 UIU/ML (ref 0.36–3.74)
VLDLC SERPL-MCNC: 28 MG/DL
WBC # BLD AUTO: 8.02 K/UL (ref 4.5–11)

## 2022-10-20 PROCEDURE — 84439 T4, FREE: ICD-10-PCS | Mod: ,,, | Performed by: CLINICAL MEDICAL LABORATORY

## 2022-10-20 PROCEDURE — 84439 ASSAY OF FREE THYROXINE: CPT | Mod: ,,, | Performed by: CLINICAL MEDICAL LABORATORY

## 2022-10-20 PROCEDURE — 80061 LIPID PANEL: CPT | Mod: ,,, | Performed by: CLINICAL MEDICAL LABORATORY

## 2022-10-20 PROCEDURE — 82306 VITAMIN D 25 HYDROXY: CPT | Mod: ,,, | Performed by: CLINICAL MEDICAL LABORATORY

## 2022-10-20 PROCEDURE — 82306 VITAMIN D: ICD-10-PCS | Mod: ,,, | Performed by: CLINICAL MEDICAL LABORATORY

## 2022-10-20 PROCEDURE — 80305 DRUG TEST PRSMV DIR OPT OBS: CPT | Mod: RHCUB | Performed by: FAMILY MEDICINE

## 2022-10-20 PROCEDURE — 85025 CBC WITH DIFFERENTIAL: ICD-10-PCS | Mod: ,,, | Performed by: CLINICAL MEDICAL LABORATORY

## 2022-10-20 PROCEDURE — 83036 HEMOGLOBIN A1C: ICD-10-PCS | Mod: ,,, | Performed by: CLINICAL MEDICAL LABORATORY

## 2022-10-20 PROCEDURE — 80053 COMPREHEN METABOLIC PANEL: CPT | Mod: ,,, | Performed by: CLINICAL MEDICAL LABORATORY

## 2022-10-20 PROCEDURE — 80061 LIPID PANEL: ICD-10-PCS | Mod: ,,, | Performed by: CLINICAL MEDICAL LABORATORY

## 2022-10-20 PROCEDURE — 80053 COMPREHENSIVE METABOLIC PANEL: ICD-10-PCS | Mod: ,,, | Performed by: CLINICAL MEDICAL LABORATORY

## 2022-10-20 PROCEDURE — 85025 COMPLETE CBC W/AUTO DIFF WBC: CPT | Mod: ,,, | Performed by: CLINICAL MEDICAL LABORATORY

## 2022-10-20 PROCEDURE — 84443 TSH: ICD-10-PCS | Mod: ,,, | Performed by: CLINICAL MEDICAL LABORATORY

## 2022-10-20 PROCEDURE — 99214 OFFICE O/P EST MOD 30 MIN: CPT | Mod: ,,, | Performed by: FAMILY MEDICINE

## 2022-10-20 PROCEDURE — 99214 PR OFFICE/OUTPT VISIT, EST, LEVL IV, 30-39 MIN: ICD-10-PCS | Mod: ,,, | Performed by: FAMILY MEDICINE

## 2022-10-20 PROCEDURE — 84443 ASSAY THYROID STIM HORMONE: CPT | Mod: ,,, | Performed by: CLINICAL MEDICAL LABORATORY

## 2022-10-20 PROCEDURE — 83036 HEMOGLOBIN GLYCOSYLATED A1C: CPT | Mod: ,,, | Performed by: CLINICAL MEDICAL LABORATORY

## 2022-10-20 RX ORDER — VALBENAZINE 40 MG/1
40 CAPSULE ORAL DAILY
Qty: 30 CAPSULE | Refills: 0 | Status: SHIPPED | OUTPATIENT
Start: 2022-10-20 | End: 2023-03-17 | Stop reason: SDUPTHER

## 2022-10-20 RX ORDER — OXYCODONE AND ACETAMINOPHEN 10; 325 MG/1; MG/1
1 TABLET ORAL EVERY 6 HOURS PRN
Qty: 120 TABLET | Refills: 0 | Status: SHIPPED | OUTPATIENT
Start: 2022-10-20 | End: 2022-10-20 | Stop reason: SDUPTHER

## 2022-10-20 RX ORDER — OXYCODONE AND ACETAMINOPHEN 10; 325 MG/1; MG/1
1 TABLET ORAL EVERY 6 HOURS PRN
Qty: 120 TABLET | Refills: 0 | Status: SHIPPED | OUTPATIENT
Start: 2022-10-20 | End: 2023-01-19 | Stop reason: SDUPTHER

## 2022-10-20 NOTE — PROGRESS NOTES
Subjective:       Patient ID: Nelida Nicholas is a 67 y.o. female.    Chief Complaint: Follow-up and Medication Refill (3 month/)    3 mo f/u.  Saw derm NP who first tried triamcinolone on the lesion in her gluteal cleft/buttock.  At f/u with her, she stopped the steroid cream and did a culture.  Called her in some amoxil.  No biopsies done yet.  This skin lesion has been present at least since July and maybe longer.  I first heard about it at her OV with me in July and she asked for some silvadene cream which another provider had given to her in the past for similar problem.  I had her try nystatin cream x 2 weeks first and if no better, then could try the silvadene.  The lesion is extremely painful, she can hardly sit without crying out in pain.      Review of Systems   Constitutional:  Negative for appetite change, chills, fatigue, fever and unexpected weight change.   Respiratory:  Negative for cough and shortness of breath.    Cardiovascular:  Negative for chest pain and leg swelling.   Gastrointestinal:  Negative for abdominal pain.   Musculoskeletal:  Negative for arthralgias.   Integumentary:  Positive for wound and mole/lesion. Negative for rash.   Neurological:  Negative for weakness.   Psychiatric/Behavioral:  The patient is not nervous/anxious.        Objective:      Physical Exam  Constitutional:       General: She is not in acute distress.     Appearance: Normal appearance.   Cardiovascular:      Rate and Rhythm: Normal rate and regular rhythm.      Heart sounds: Normal heart sounds.   Pulmonary:      Breath sounds: Normal breath sounds.   Abdominal:      General: Abdomen is flat.      Palpations: Abdomen is soft.   Skin:     General: Skin is warm and dry.      Comments: Shallow ulcerative beefy red lesion on gluteal cleft and left buttock, no drainage   Neurological:      Mental Status: She is alert. Mental status is at baseline.   Psychiatric:         Mood and Affect: Mood normal.         Behavior:  Behavior normal.         Thought Content: Thought content normal.         Judgment: Judgment normal.       Assessment:       1. Type 1 diabetes mellitus with hypoglycemia and without coma  Comprehensive Metabolic Panel    Hemoglobin A1C    Lipid Panel    Comprehensive Metabolic Panel    Hemoglobin A1C    Lipid Panel      2. Encounter for long-term (current) use of other medications  CBC Auto Differential    Comprehensive Metabolic Panel    Hemoglobin A1C    Lipid Panel    POCT Urine Drug Screen Presump    Vitamin D    CBC Auto Differential    Comprehensive Metabolic Panel    Hemoglobin A1C    Lipid Panel    Vitamin D      3. Nutritional anemia  CBC Auto Differential    Comprehensive Metabolic Panel    CBC Auto Differential    Comprehensive Metabolic Panel      4. Hypothyroidism, unspecified type  CBC Auto Differential    T4, Free    TSH    CBC Auto Differential    T4, Free    TSH      5. Osteoporosis, unspecified osteoporosis type, unspecified pathological fracture presence  Comprehensive Metabolic Panel    Vitamin D    Comprehensive Metabolic Panel    Vitamin D      6. Mixed hyperlipidemia  Lipid Panel    Lipid Panel      7. Colon cancer screening  Ambulatory referral/consult to Gastroenterology      8. Elevated levels of transaminase & lactic acid dehydrogenase  Ambulatory referral/consult to Gastroenterology      9. Constipation, unspecified constipation type  Ambulatory referral/consult to Gastroenterology      10. Open wound of skin            Plan:       I am not happy with the look of her wound.  Doubt amoxil is going to help.  Got her an appt with Dr. King for the 31st at 2:30pm (they had sooner appts open but pt's transportation will be out of town next week and she couldn't go).      Send labs to Dr. Hammond, wonder if she is candidate for that new twice a year cholesterol shot?  Cannot tolerate statins (significant rise in LFTs).  Ins stopped paying for repatha.    Pt requests Appt with Saba Gasca  NP GI, afternoon appt     and UDS reviewed, ok for refills.

## 2022-10-21 RX ORDER — BENZONATATE 100 MG/1
100 CAPSULE ORAL 3 TIMES DAILY PRN
Qty: 90 CAPSULE | Refills: 5 | Status: SHIPPED | OUTPATIENT
Start: 2022-10-21 | End: 2023-03-02 | Stop reason: SDUPTHER

## 2022-10-24 PROBLEM — T14.8XXA OPEN WOUND OF SKIN: Status: ACTIVE | Noted: 2022-10-24

## 2022-10-31 ENCOUNTER — EXTERNAL CHRONIC CARE MANAGEMENT (OUTPATIENT)
Dept: FAMILY MEDICINE | Facility: CLINIC | Age: 67
End: 2022-10-31
Payer: MEDICARE

## 2022-10-31 PROCEDURE — G0511 CCM/BHI BY RHC/FQHC 20MIN MO: HCPCS | Mod: ,,, | Performed by: FAMILY MEDICINE

## 2022-10-31 PROCEDURE — G0511 PR CHRONIC CARE MGMT, RHC OR FQHC ONLY, 20 MINS OR MORE: ICD-10-PCS | Mod: ,,, | Performed by: FAMILY MEDICINE

## 2022-11-11 PROBLEM — G24.01 TARDIVE DYSKINESIA: Status: ACTIVE | Noted: 2022-11-11

## 2022-11-30 ENCOUNTER — EXTERNAL CHRONIC CARE MANAGEMENT (OUTPATIENT)
Dept: FAMILY MEDICINE | Facility: CLINIC | Age: 67
End: 2022-11-30
Payer: MEDICARE

## 2022-11-30 PROCEDURE — G0511 CCM/BHI BY RHC/FQHC 20MIN MO: HCPCS | Mod: ,,, | Performed by: FAMILY MEDICINE

## 2022-11-30 PROCEDURE — G0511 PR CHRONIC CARE MGMT, RHC OR FQHC ONLY, 20 MINS OR MORE: ICD-10-PCS | Mod: ,,, | Performed by: FAMILY MEDICINE

## 2022-12-07 ENCOUNTER — TELEPHONE (OUTPATIENT)
Dept: GASTROENTEROLOGY | Facility: CLINIC | Age: 67
End: 2022-12-07
Payer: MEDICARE

## 2022-12-19 ENCOUNTER — TELEPHONE (OUTPATIENT)
Dept: GASTROENTEROLOGY | Facility: CLINIC | Age: 67
End: 2022-12-19
Payer: MEDICARE

## 2022-12-19 ENCOUNTER — OFFICE VISIT (OUTPATIENT)
Dept: GASTROENTEROLOGY | Facility: CLINIC | Age: 67
End: 2022-12-19
Payer: MEDICARE

## 2022-12-19 VITALS
BODY MASS INDEX: 30.77 KG/M2 | HEART RATE: 77 BPM | DIASTOLIC BLOOD PRESSURE: 62 MMHG | HEIGHT: 59 IN | OXYGEN SATURATION: 100 % | SYSTOLIC BLOOD PRESSURE: 174 MMHG | WEIGHT: 152.63 LBS

## 2022-12-19 DIAGNOSIS — Z86.010 HX OF COLONIC POLYPS: Primary | ICD-10-CM

## 2022-12-19 DIAGNOSIS — Z12.11 SCREENING FOR MALIGNANT NEOPLASM OF COLON: ICD-10-CM

## 2022-12-19 DIAGNOSIS — K59.00 CONSTIPATION, UNSPECIFIED CONSTIPATION TYPE: ICD-10-CM

## 2022-12-19 DIAGNOSIS — R94.5 ABNORMAL RESULTS OF LIVER FUNCTION STUDIES: Primary | ICD-10-CM

## 2022-12-19 PROCEDURE — 99214 PR OFFICE/OUTPT VISIT, EST, LEVL IV, 30-39 MIN: ICD-10-PCS | Mod: ,,, | Performed by: NURSE PRACTITIONER

## 2022-12-19 PROCEDURE — 99214 OFFICE O/P EST MOD 30 MIN: CPT | Mod: ,,, | Performed by: NURSE PRACTITIONER

## 2022-12-19 NOTE — TELEPHONE ENCOUNTER
Results called to patient. Verbalized understanding.            ----- Message from SERGEI Davis sent at 12/19/2022  4:40 PM CST -----  Minimal anemia. CMP pending

## 2022-12-19 NOTE — PROGRESS NOTES
Nelida Nicholas is a 67 y.o. female here for No chief complaint on file.        PCP: Gisela Talbot  Referring Provider: No referring provider defined for this encounter.     HPI:  Presents for elevated liver enzymes. Recent labs from [facility] and ALT is normal. AST is 42. She is a DM I. Hgb A1C 7.1. Last liver ultrasound 7/2021, read as normal. She has been unable to get Repatha due to insurance coverage and is unable to tolerate Statins. States that she is going to try to get patient assistance. Endorses constipation. She is due for a colonoscopy. Had to cancel the last one that was scheduled due to hypoglycemia. Discussed that she may drink a soda or juice if she drops blood sugar again while prepping for the colonoscopy. No hematochezia or melena.She has had a recent weight gain. Reports increased eating due to boredom.        ROS:  Review of Systems   Constitutional:  Negative for appetite change, fatigue, fever and unexpected weight change.   HENT:  Negative for trouble swallowing.    Eyes:  Positive for visual disturbance.   Respiratory:  Negative for shortness of breath.    Cardiovascular:  Negative for chest pain and palpitations.   Gastrointestinal:  Positive for constipation. Negative for abdominal pain, blood in stool, change in bowel habit, diarrhea, nausea, vomiting, reflux and change in bowel habit.   Musculoskeletal:  Positive for arthralgias and back pain. Negative for gait problem.   Integumentary:  Negative for pallor.   Hematological:  Does not bruise/bleed easily.   Psychiatric/Behavioral:  The patient is not nervous/anxious.         PMHX:  has a past medical history of Allergic rhinitis, Back pain, thoracic, Brittle diabetes, Carotid bruit, Chronic anemia, Chronic major depressive disorder, recurrent episode, Chronic migraine w/o aura, not intractable, w/o stat migr, Chronic pain syndrome, Constipation, Corn or callus, Coronary arteriosclerosis, Degenerative joint disease involving  multiple joints, Diabetes mellitus type I, Elevated levels of transaminase & lactic acid dehydrogenase, Encounter for long-term (current) use of insulin, Encounter for long-term (current) use of other medications, Epigastric pain, Fatigue, GERD (gastroesophageal reflux disease), Hypercalcemia, Hyperkalemia, Hyperlipidemia, Hypo-osmolar hyponatremia, Hypothyroidism, Lumbosacral spondylosis with myelopathy, Memory loss, Osteoporosis, Restless legs syndrome (RLS), Scoliosis deformity of spine, Sleep disorder, and Vitamin D deficiency.    PSHX:  has a past surgical history that includes Esophagogastroduodenoscopy (03/11/2011); Echocardiography (06/27/2012); Finger surgery; Carpal tunnel release; Cholecystectomy; Reduction of both breasts; Coronary artery bypass graft (1997); ARTERIAL DOPPLER (11/07/2017); ARTERIAL DOPPLER DUPLEX (Bilateral, 11/07/2017); CAROTID DOPPLER (11/07/2017); Neck surgery; Shoulder surgery; Tubal ligation; Breast biopsy; and Total Reduction Mammoplasty.    PFHX: family history includes Breast cancer in her maternal aunt; Heart disease in her father, maternal grandmother, and paternal grandfather; Hyperlipidemia in her father; Hypertension in her brother, father, and mother.    PSlHX:  reports that she quit smoking about 34 years ago. Her smoking use included cigarettes. She started smoking about 44 years ago. She has never used smokeless tobacco. She reports that she does not drink alcohol and does not use drugs.        Review of patient's allergies indicates:   Allergen Reactions    Klonopin [clonazepam]     Nalbuphine     Opioids-meperidine and related     Statins-hmg-coa reductase inhibitors Other (See Comments)     Elevated Liver Enzymes    Toradol [ketorolac]        Medication List with Changes/Refills   Current Medications    ATOGEPANT (QULIPTA) 60 MG TAB    Take 60 mg by mouth Daily. To prevent migraines    AZELASTINE (ASTELIN) 137 MCG (0.1 %) NASAL SPRAY    INHALE 1-2 SPRAYS IN EACH  "NOSTRIL TWICE DAILY AS NEEDED ..SHAKE GENTLY BEFORE USE    BENZONATATE (TESSALON) 100 MG CAPSULE    Take 1 capsule (100 mg total) by mouth 3 (three) times daily as needed for Cough.    BREXPIPRAZOLE (REXULTI) 2 MG TAB    Take 1 tablet by mouth once daily.    CARVEDILOL (COREG) 12.5 MG TABLET    TAKE 1 TABLET BY MOUTH TWICE DAILY FOR BLOOD PRESSURE &/OR HEART "TAKE WITH FOOD" Strength: 12.5 mg    CETIRIZINE (ZYRTEC) 10 MG TABLET    Take 1 tablet (10 mg total) by mouth once daily.    COLESEVELAM (WELCHOL) 625 MG TABLET    One tab TID    DEXLANSOPRAZOLE (DEXILANT) 60 MG CAPSULE    Take 1 capsule (60 mg total) by mouth once daily.    DICLOFENAC SODIUM (VOLTAREN) 1 % GEL    Apply 2 g topically 4 (four) times daily as needed.    DULOXETINE (CYMBALTA) 60 MG CAPSULE    Take 1 capsule (60 mg total) by mouth once daily.    EVOLOCUMAB (REPATHA SURECLICK) 140 MG/ML PNIJ    Inject 1 mL (140 mg total) into the skin every 14 (fourteen) days.    FLASH GLUCOSE SCANNING READER (FREESTYLE GUICHO 2 READER) [facility]    Use to test BG    FLASH GLUCOSE SENSOR (FREESTYLE GUICHO 2 SENSOR) KIT    Use as directed    FLUTICASONE PROPIONATE (FLONASE) 50 MCG/ACTUATION NASAL SPRAY    2 sprays (100 mcg total) by Each Nostril route once daily.    FUROSEMIDE (LASIX) 20 MG TABLET    Take 1 tablet (20 mg total) by mouth once daily. Prn fluid    GABAPENTIN (NEURONTIN) 100 MG CAPSULE    Take 1 capsule (100 mg total) by mouth 2 (two) times daily.    GABAPENTIN (NEURONTIN) 800 MG TABLET    Take 1 tablet (800 mg total) by mouth every evening.    HYDROXYZINE PAMOATE (VISTARIL) 25 MG CAP    Take 1 capsule (25 mg total) by mouth every 6 (six) hours as needed (nerves, itching, insomnia, nausea).    INSULIN ASPART U-100 (NOVOLOG) 100 UNIT/ML INJECTION    Inject 50 Units into the skin 3 (three) times daily before meals.    INSULIN ASPART U-100 (NOVOLOG) 100 UNIT/ML INJECTION    Use in insulin pump 50 units daily    KETOCONAZOLE (NIZORAL) 2 % CREAM    Apply topically " "once daily. For at least 2 weeks    LATANOPROST 0.005 % OPHTHALMIC SOLUTION    PLACE 1 DROP INTO BOTH EYES AT BEDTIME AS DIRECTED    LINZESS 72 MCG CAP CAPSULE    Take 1 capsule (72 mcg total) by mouth once daily.    LOSARTAN (COZAAR) 100 MG TABLET    Take 1 tablet (100 mg total) by mouth once daily.    MELOXICAM (MOBIC) 7.5 MG TABLET    Take 1 tablet (7.5 mg total) by mouth once daily.    MIRABEGRON (MYRBETRIQ) 50 MG TB24    Take 1 tablet (50 mg total) by mouth once daily.    MONTELUKAST (SINGULAIR) 10 MG TABLET    Take 1 tablet (10 mg total) by mouth once daily.    NYSTATIN (MYCOSTATIN) CREAM    APPLY TO AFFECTED AREA(S) TWICE DAILY AS DIRECTED    OXYCODONE-ACETAMINOPHEN (PERCOCET)  MG PER TABLET    Take 1 tablet by mouth every 6 (six) hours as needed for Pain. May fill 12/23/22 (due to holiday)    PROMETHAZINE (PHENERGAN) 25 MG TABLET    TAKE 1 TABLET BY MOUTH EVERY 6 HOURS AS NEEDED FOR NAUSEA    REXULTI 2 MG TAB    Take 1 tablet by mouth once daily.    SILVER SULFADIAZINE 1% (SILVADENE) 1 % CREAM    Apply topically once daily. As directed    SYNTHROID 150 MCG TABLET    Take 1 tablet (150 mcg total) by mouth before breakfast.    TIZANIDINE (ZANAFLEX) 4 MG TABLET    TAKE 1 TABLET BY MOUTH 4 TIMES A DAY AS NEEDED FOR MUSCLE SPASM .. may cause drowsiness / no alcohol / no driving    TRAZODONE (DESYREL) 150 MG TABLET    Take 1 tablet (150 mg total) by mouth every evening.    VALACYCLOVIR (VALTREX) 500 MG TABLET    Take 1 tablet (500 mg total) by mouth 2 (two) times daily.    VALBENAZINE (INGREZZA) 40 MG CAP    Take 40 mg by mouth once daily.        Objective Findings:  Vital Signs:  BP (!) 174/62   Pulse 77   Ht 4' 11" (1.499 m)   Wt 69.2 kg (152 lb 9.6 oz)   SpO2 100%   BMI 30.82 kg/m²  Body mass index is 30.82 kg/m².    Physical Exam:  Physical Exam  Vitals and nursing note reviewed.   Constitutional:       General: She is not in acute distress.     Appearance: Normal appearance.   HENT:      " Mouth/Throat:      Mouth: Mucous membranes are dry.   Cardiovascular:      Rate and Rhythm: Normal rate.   Pulmonary:      Effort: Pulmonary effort is normal.      Breath sounds: No wheezing, rhonchi or rales.   Abdominal:      General: Bowel sounds are normal. There is no distension.      Palpations: Abdomen is soft. There is no mass.      Tenderness: There is no abdominal tenderness. There is no guarding or rebound.      Hernia: No hernia is present.   Skin:     General: Skin is warm and dry.      Coloration: Skin is not jaundiced or pale.   Neurological:      Mental Status: She is alert and oriented to person, place, and time.   Psychiatric:         Mood and Affect: Mood normal.        Labs:  Lab Results   Component Value Date    WBC 8.02 10/20/2022    HGB 12.6 10/20/2022    HCT 37.0 (L) 10/20/2022    MCV 93.0 10/20/2022    RDW 12.3 10/20/2022     10/20/2022    LYMPH 24.8 (L) 10/20/2022    LYMPH 1.99 10/20/2022    MONO 6.1 (H) 10/20/2022    EOS 0.38 10/20/2022    BASO 0.06 10/20/2022     Lab Results   Component Value Date     (L) 10/20/2022    K 5.3 (H) 10/20/2022    CL 98 10/20/2022    CO2 28 10/20/2022    GLU 74 10/20/2022    BUN 13 10/20/2022    CREATININE 0.91 10/20/2022    CALCIUM 9.5 10/20/2022    PROT 7.7 10/20/2022    ALBUMIN 3.7 10/20/2022    BILITOT 0.6 10/20/2022    ALKPHOS 103 10/20/2022    AST 42 (H) 10/20/2022    ALT 30 10/20/2022         Imaging: No results found.      Assessment:  Nelida Nicholas is a 67 y.o. female here with:  1. Abnormal results of liver function studies     2. Constipation, unspecified constipation type    3. Screening for malignant neoplasm of colon          Recommendations:  1. Liver ultrasound and elastography  2. Labs today below  3. Patient will attempt assistance for Repatha  4. Miralax powder 17 grams in 8 ounces of liquid  5. Weight loss of 7-10%. Weight loss should be gradual  Diet low in saturated fats and carbohydrates  Good glucose and cholesterol  control  6. Reschedule colonoscopy previously ordered      Follow up in about 6 months (around 6/19/2023).      Order summary:  Orders Placed This Encounter    US Abdomen Limited    US Elastography Liver    Comprehensive Metabolic Panel    CBC Auto Differential       Thank you for allowing me to participate in the care of Nelida Nicholas.      CYRIL Mir

## 2022-12-21 ENCOUNTER — TELEPHONE (OUTPATIENT)
Dept: GASTROENTEROLOGY | Facility: CLINIC | Age: 67
End: 2022-12-21
Payer: MEDICARE

## 2022-12-21 NOTE — TELEPHONE ENCOUNTER
Results called to patient. Verbalized understanding.            ----- Message from SERGEI Davis sent at 12/21/2022  4:22 PM CST -----  Anemia but is improved. Liver enzymes are mildly elevated.

## 2022-12-28 ENCOUNTER — TELEPHONE (OUTPATIENT)
Dept: GASTROENTEROLOGY | Facility: CLINIC | Age: 67
End: 2022-12-28
Payer: MEDICARE

## 2022-12-28 ENCOUNTER — HOSPITAL ENCOUNTER (OUTPATIENT)
Dept: RADIOLOGY | Facility: HOSPITAL | Age: 67
Discharge: HOME | End: 2022-12-28
Attending: NURSE PRACTITIONER
Payer: MEDICARE

## 2022-12-28 DIAGNOSIS — R94.5 ABNORMAL RESULTS OF LIVER FUNCTION STUDIES: ICD-10-CM

## 2022-12-28 PROCEDURE — 91200 US ELASTOGRAPHY LIVER: ICD-10-PCS | Mod: 26,,, | Performed by: RADIOLOGY

## 2022-12-28 PROCEDURE — 91200 LIVER ELASTOGRAPHY: CPT | Mod: 26,,, | Performed by: RADIOLOGY

## 2022-12-28 PROCEDURE — 76705 ECHO EXAM OF ABDOMEN: CPT | Mod: 26,59,, | Performed by: RADIOLOGY

## 2022-12-28 PROCEDURE — 76705 US ABDOMEN LIMITED: ICD-10-PCS | Mod: 26,59,, | Performed by: RADIOLOGY

## 2022-12-28 PROCEDURE — 76705 ECHO EXAM OF ABDOMEN: CPT | Mod: TC

## 2022-12-28 PROCEDURE — 91200 LIVER ELASTOGRAPHY: CPT | Mod: TC

## 2022-12-28 NOTE — TELEPHONE ENCOUNTER
Results called to patient. Verbalized understanding.          ----- Message from SERGEI Davis sent at 12/28/2022 12:19 PM CST -----  Liver ultrasound is read as normal, elastography is normal to mild fibrosis

## 2022-12-31 ENCOUNTER — EXTERNAL CHRONIC CARE MANAGEMENT (OUTPATIENT)
Dept: FAMILY MEDICINE | Facility: CLINIC | Age: 67
End: 2022-12-31
Payer: MEDICARE

## 2022-12-31 PROCEDURE — G0511 PR CHRONIC CARE MGMT, RHC OR FQHC ONLY, 20 MINS OR MORE: ICD-10-PCS | Mod: ,,, | Performed by: FAMILY MEDICINE

## 2022-12-31 PROCEDURE — G0511 CCM/BHI BY RHC/FQHC 20MIN MO: HCPCS | Mod: ,,, | Performed by: FAMILY MEDICINE

## 2023-01-19 ENCOUNTER — OFFICE VISIT (OUTPATIENT)
Dept: FAMILY MEDICINE | Facility: CLINIC | Age: 68
End: 2023-01-19
Payer: MEDICARE

## 2023-01-19 VITALS
DIASTOLIC BLOOD PRESSURE: 70 MMHG | OXYGEN SATURATION: 98 % | TEMPERATURE: 98 F | SYSTOLIC BLOOD PRESSURE: 140 MMHG | WEIGHT: 151 LBS | HEIGHT: 59 IN | HEART RATE: 77 BPM | RESPIRATION RATE: 16 BRPM | BODY MASS INDEX: 30.44 KG/M2

## 2023-01-19 DIAGNOSIS — R74.02 ELEVATED LEVELS OF TRANSAMINASE & LACTIC ACID DEHYDROGENASE: ICD-10-CM

## 2023-01-19 DIAGNOSIS — Z79.899 ENCOUNTER FOR LONG-TERM (CURRENT) USE OF OTHER MEDICATIONS: ICD-10-CM

## 2023-01-19 DIAGNOSIS — G89.4 CHRONIC PAIN SYNDROME: ICD-10-CM

## 2023-01-19 DIAGNOSIS — N95.2 ATROPHIC VAGINITIS: ICD-10-CM

## 2023-01-19 DIAGNOSIS — E10.649 TYPE 1 DIABETES MELLITUS WITH HYPOGLYCEMIA AND WITHOUT COMA: Primary | ICD-10-CM

## 2023-01-19 DIAGNOSIS — N76.3 CHRONIC VULVITIS: ICD-10-CM

## 2023-01-19 DIAGNOSIS — R74.01 ELEVATED LEVELS OF TRANSAMINASE & LACTIC ACID DEHYDROGENASE: ICD-10-CM

## 2023-01-19 LAB
ALBUMIN SERPL BCP-MCNC: 3.5 G/DL (ref 3.5–5)
ALBUMIN/GLOB SERPL: 0.9 {RATIO}
ALP SERPL-CCNC: 111 U/L (ref 55–142)
ALT SERPL W P-5'-P-CCNC: 19 U/L (ref 13–56)
ANION GAP SERPL CALCULATED.3IONS-SCNC: 8 MMOL/L (ref 7–16)
AST SERPL W P-5'-P-CCNC: 20 U/L (ref 15–37)
BILIRUB SERPL-MCNC: 0.2 MG/DL (ref ?–1.2)
BUN SERPL-MCNC: 17 MG/DL (ref 7–18)
BUN/CREAT SERPL: 19 (ref 6–20)
CALCIUM SERPL-MCNC: 9.4 MG/DL (ref 8.5–10.1)
CHLORIDE SERPL-SCNC: 107 MMOL/L (ref 98–107)
CO2 SERPL-SCNC: 29 MMOL/L (ref 21–32)
CREAT SERPL-MCNC: 0.9 MG/DL (ref 0.55–1.02)
CTP QC/QA: YES
EGFR (NO RACE VARIABLE) (RUSH/TITUS): 70 ML/MIN/1.73M²
EST. AVERAGE GLUCOSE BLD GHB EST-MCNC: 150 MG/DL
GLOBULIN SER-MCNC: 3.7 G/DL (ref 2–4)
GLUCOSE SERPL-MCNC: 137 MG/DL (ref 74–106)
HBA1C MFR BLD HPLC: 7.1 % (ref 4.5–6.6)
POC (AMP) AMPHETAMINE: NEGATIVE
POC (BAR) BARBITURATES: NEGATIVE
POC (BUP) BUPRENORPHINE: NEGATIVE
POC (BZO) BENZODIAZEPINES: NEGATIVE
POC (COC) COCAINE: NEGATIVE
POC (MDMA) METHYLENEDIOXYMETHAMPHETAMINE 3,4: NEGATIVE
POC (MET) METHAMPHETAMINE: NEGATIVE
POC (MOP) OPIATES: ABNORMAL
POC (MTD) METHADONE: NEGATIVE
POC (OXY) OXYCODONE: ABNORMAL
POC (PCP) PHENCYCLIDINE: NEGATIVE
POC (TCA) TRICYCLIC ANTIDEPRESSANTS: ABNORMAL
POC TEMPERATURE (URINE): 92
POC THC: NEGATIVE
POTASSIUM SERPL-SCNC: 4.6 MMOL/L (ref 3.5–5.1)
PROT SERPL-MCNC: 7.2 G/DL (ref 6.4–8.2)
SODIUM SERPL-SCNC: 139 MMOL/L (ref 136–145)

## 2023-01-19 PROCEDURE — 80053 COMPREHEN METABOLIC PANEL: CPT | Mod: ,,, | Performed by: CLINICAL MEDICAL LABORATORY

## 2023-01-19 PROCEDURE — 83036 HEMOGLOBIN GLYCOSYLATED A1C: CPT | Mod: ,,, | Performed by: CLINICAL MEDICAL LABORATORY

## 2023-01-19 PROCEDURE — 80305 DRUG TEST PRSMV DIR OPT OBS: CPT | Mod: RHCUB | Performed by: FAMILY MEDICINE

## 2023-01-19 PROCEDURE — 99214 OFFICE O/P EST MOD 30 MIN: CPT | Mod: ,,, | Performed by: FAMILY MEDICINE

## 2023-01-19 PROCEDURE — 83036 HEMOGLOBIN A1C: ICD-10-PCS | Mod: ,,, | Performed by: CLINICAL MEDICAL LABORATORY

## 2023-01-19 PROCEDURE — 80053 COMPREHENSIVE METABOLIC PANEL: ICD-10-PCS | Mod: ,,, | Performed by: CLINICAL MEDICAL LABORATORY

## 2023-01-19 PROCEDURE — 99214 PR OFFICE/OUTPT VISIT, EST, LEVL IV, 30-39 MIN: ICD-10-PCS | Mod: ,,, | Performed by: FAMILY MEDICINE

## 2023-01-19 RX ORDER — OXYCODONE AND ACETAMINOPHEN 10; 325 MG/1; MG/1
1 TABLET ORAL EVERY 6 HOURS PRN
Qty: 120 TABLET | Refills: 0 | Status: SHIPPED | OUTPATIENT
Start: 2023-01-19 | End: 2023-03-29 | Stop reason: SDUPTHER

## 2023-01-19 RX ORDER — OXYCODONE AND ACETAMINOPHEN 10; 325 MG/1; MG/1
1 TABLET ORAL EVERY 6 HOURS PRN
Qty: 120 TABLET | Refills: 0 | Status: SHIPPED | OUTPATIENT
Start: 2023-01-19 | End: 2023-01-19 | Stop reason: SDUPTHER

## 2023-01-19 RX ORDER — FLUCONAZOLE 150 MG/1
TABLET ORAL
Qty: 2 TABLET | Refills: 0 | Status: SHIPPED | OUTPATIENT
Start: 2023-01-19 | End: 2023-03-30

## 2023-01-19 RX ORDER — DOXYCYCLINE 100 MG/1
100 CAPSULE ORAL EVERY 12 HOURS
Qty: 20 CAPSULE | Refills: 0 | Status: SHIPPED | OUTPATIENT
Start: 2023-01-19 | End: 2023-01-29

## 2023-01-19 RX ORDER — MUPIROCIN 20 MG/G
OINTMENT TOPICAL 3 TIMES DAILY
Qty: 30 G | Refills: 1 | Status: SHIPPED | OUTPATIENT
Start: 2023-01-19 | End: 2023-06-08 | Stop reason: SDUPTHER

## 2023-01-19 NOTE — PROGRESS NOTES
Subjective:       Patient ID: Nelida Nicholas is a 67 y.o. female.    Chief Complaint: Medication Refill (3 month) and Follow-up    3 mo checkup.  Saw Fernando who gave her doxy and bactroban.  Wound nearly healed but now has new lesion on opposite buttock.  Also her redness and sores on perineum/vulva, very painful.    Review of Systems   Constitutional:  Negative for appetite change, chills, fatigue, fever and unexpected weight change.   Respiratory:  Negative for cough and shortness of breath.    Cardiovascular:  Negative for chest pain and leg swelling.   Gastrointestinal:  Negative for abdominal pain.   Musculoskeletal:  Positive for arthralgias.   Integumentary:  Positive for color change, rash and wound.   Neurological:  Positive for numbness. Negative for weakness.   Psychiatric/Behavioral:  The patient is not nervous/anxious.        Objective:      Physical Exam  Constitutional:       General: She is not in acute distress.     Appearance: Normal appearance.   Cardiovascular:      Rate and Rhythm: Normal rate and regular rhythm.      Heart sounds: Normal heart sounds.   Pulmonary:      Breath sounds: Normal breath sounds.   Abdominal:      General: Abdomen is flat.      Palpations: Abdomen is soft.   Skin:     General: Skin is warm and dry.      Findings: Erythema, lesion and rash present.      Comments: Previous lesion on right buttock seems smaller, less red and irritated but has new lesion on left buttock that is smaller (about 2cm) and is erythematous and scaly.  On perineum/vulva, there is irritation, erythema, and ulcerative lesions   Neurological:      Mental Status: She is alert. Mental status is at baseline.   Psychiatric:         Mood and Affect: Mood normal.         Behavior: Behavior normal.         Thought Content: Thought content normal.         Judgment: Judgment normal.       Assessment:       1. Type 1 diabetes mellitus with hypoglycemia and without coma  Comprehensive Metabolic Panel     "Hemoglobin A1C    Comprehensive Metabolic Panel    Hemoglobin A1C      2. Encounter for long-term (current) use of other medications  Comprehensive Metabolic Panel    Hemoglobin A1C    POCT Urine Drug Screen Presump    Comprehensive Metabolic Panel    Hemoglobin A1C      3. Elevated levels of transaminase & lactic acid dehydrogenase  Comprehensive Metabolic Panel    Comprehensive Metabolic Panel      4. Atrophic vaginitis  Ambulatory referral/consult to Obstetrics / Gynecology      5. Chronic vulvitis  Ambulatory referral/consult to Obstetrics / Gynecology      6. Chronic pain syndrome            Plan:       Refill mupirocin and abx that Fernando gave her for buttock lesions  Appt with Pedro (gyn) for vulvar lesions although it looks like diaper rash to me so gave her a couple diflucan and told her to change diapers frequently and to "air out" that area as much as possible.   and UDS reviewed, ok for refills  Note:  she has insulin pump and labile blood sugars so must check sugars 8-10 times a day.        "

## 2023-01-20 RX ORDER — SILVER SULFADIAZINE 10 G/1000G
CREAM TOPICAL DAILY
Qty: 50 G | Refills: 0 | Status: SHIPPED | OUTPATIENT
Start: 2023-01-20 | End: 2023-05-25

## 2023-01-30 ENCOUNTER — ANESTHESIA EVENT (OUTPATIENT)
Dept: GASTROENTEROLOGY | Facility: HOSPITAL | Age: 68
End: 2023-01-30
Payer: MEDICARE

## 2023-01-30 ENCOUNTER — HOSPITAL ENCOUNTER (OUTPATIENT)
Dept: GASTROENTEROLOGY | Facility: HOSPITAL | Age: 68
Discharge: HOME | End: 2023-01-30
Attending: INTERNAL MEDICINE
Payer: MEDICARE

## 2023-01-30 ENCOUNTER — ANESTHESIA (OUTPATIENT)
Dept: GASTROENTEROLOGY | Facility: HOSPITAL | Age: 68
End: 2023-01-30
Payer: MEDICARE

## 2023-01-30 ENCOUNTER — CLINICAL SUPPORT (OUTPATIENT)
Dept: FAMILY MEDICINE | Facility: CLINIC | Age: 68
End: 2023-01-30
Payer: MEDICARE

## 2023-01-30 VITALS
OXYGEN SATURATION: 100 % | WEIGHT: 151 LBS | RESPIRATION RATE: 17 BRPM | SYSTOLIC BLOOD PRESSURE: 133 MMHG | DIASTOLIC BLOOD PRESSURE: 55 MMHG | BODY MASS INDEX: 30.5 KG/M2 | TEMPERATURE: 97 F | HEART RATE: 76 BPM

## 2023-01-30 DIAGNOSIS — Z86.010 HX OF COLONIC POLYPS: ICD-10-CM

## 2023-01-30 DIAGNOSIS — M81.0 OSTEOPOROSIS, UNSPECIFIED OSTEOPOROSIS TYPE, UNSPECIFIED PATHOLOGICAL FRACTURE PRESENCE: Primary | ICD-10-CM

## 2023-01-30 DIAGNOSIS — Z83.719 FAMILY HISTORY OF COLONIC POLYPS: ICD-10-CM

## 2023-01-30 DIAGNOSIS — Z12.11 SCREENING FOR COLON CANCER: Primary | ICD-10-CM

## 2023-01-30 LAB — GLUCOSE SERPL-MCNC: 237 MG/DL (ref 70–110)

## 2023-01-30 PROCEDURE — 96372 PR INJECTION,THERAP/PROPH/DIAG2ST, IM OR SUBCUT: ICD-10-PCS | Mod: ,,, | Performed by: FAMILY MEDICINE

## 2023-01-30 PROCEDURE — 82962 GLUCOSE BLOOD TEST: CPT

## 2023-01-30 PROCEDURE — 37000008 HC ANESTHESIA 1ST 15 MINUTES

## 2023-01-30 PROCEDURE — 27000716 HC OXISENSOR PROBE, ANY SIZE

## 2023-01-30 PROCEDURE — 27000284 HC CANNULA NASAL

## 2023-01-30 PROCEDURE — 25000003 PHARM REV CODE 250

## 2023-01-30 PROCEDURE — G0105 COLORECTAL SCRN; HI RISK IND: ICD-10-PCS | Mod: ,,, | Performed by: INTERNAL MEDICINE

## 2023-01-30 PROCEDURE — G0105 COLORECTAL SCRN; HI RISK IND: HCPCS | Performed by: INTERNAL MEDICINE

## 2023-01-30 PROCEDURE — D9220A PRA ANESTHESIA: Mod: ,,,

## 2023-01-30 PROCEDURE — 63600175 PHARM REV CODE 636 W HCPCS

## 2023-01-30 PROCEDURE — D9220A PRA ANESTHESIA: ICD-10-PCS | Mod: ,,,

## 2023-01-30 PROCEDURE — 96372 THER/PROPH/DIAG INJ SC/IM: CPT | Mod: ,,, | Performed by: FAMILY MEDICINE

## 2023-01-30 PROCEDURE — G0105 COLORECTAL SCRN; HI RISK IND: HCPCS | Mod: ,,, | Performed by: INTERNAL MEDICINE

## 2023-01-30 PROCEDURE — 37000009 HC ANESTHESIA EA ADD 15 MINS

## 2023-01-30 RX ORDER — PROPOFOL 10 MG/ML
VIAL (ML) INTRAVENOUS
Status: DISCONTINUED | OUTPATIENT
Start: 2023-01-30 | End: 2023-01-30

## 2023-01-30 RX ORDER — SODIUM CHLORIDE 0.9 % (FLUSH) 0.9 %
10 SYRINGE (ML) INJECTION
Status: CANCELLED | OUTPATIENT
Start: 2023-01-30

## 2023-01-30 RX ORDER — LIDOCAINE HYDROCHLORIDE 20 MG/ML
INJECTION INTRAVENOUS
Status: DISCONTINUED | OUTPATIENT
Start: 2023-01-30 | End: 2023-01-30

## 2023-01-30 RX ADMIN — SODIUM CHLORIDE: 9 INJECTION, SOLUTION INTRAVENOUS at 08:01

## 2023-01-30 RX ADMIN — LIDOCAINE HYDROCHLORIDE 80 MG: 20 INJECTION, SOLUTION INTRAVENOUS at 08:01

## 2023-01-30 RX ADMIN — PROPOFOL 20 MG: 10 INJECTION, EMULSION INTRAVENOUS at 09:01

## 2023-01-30 RX ADMIN — PROPOFOL 30 MG: 10 INJECTION, EMULSION INTRAVENOUS at 09:01

## 2023-01-30 RX ADMIN — PROPOFOL 40 MG: 10 INJECTION, EMULSION INTRAVENOUS at 09:01

## 2023-01-30 RX ADMIN — PROPOFOL 50 MG: 10 INJECTION, EMULSION INTRAVENOUS at 08:01

## 2023-01-30 NOTE — DISCHARGE INSTRUCTIONS
Procedure Date  1/30/23     Impression  Overall Impression: No colon polyps were seen. Retained stool was present.     Recommendation    Repeat colonoscopy in 5 years      High fiber diet  Disp: DC to home in stable condition. Resume diet, no driving x 24 hours. F/U with PCP and GI clinic as scheduled. Dx: Family hx of colon polyps; no polyps were seen today.     Indication  Family history of colon polyps    Do Not Drive or Operate heavy machinery for 24 hrs  Avoid making critical decisions or signing any legal documents.

## 2023-01-30 NOTE — TRANSFER OF CARE
Anesthesia Transfer of Care Note    Patient: Nelida Nicholas    Procedure(s) Performed: Colonoscopy     Patient location: GI    Anesthesia Type: general    Transport from OR: Transported from OR on room air with adequate spontaneous ventilation. Continuous ECG monitoring in transport. Continuous SpO2 monitoring in transport    Post pain: adequate analgesia    Post assessment: no apparent anesthetic complications    Post vital signs: stable    Level of consciousness: sedated and responds to stimulation    Nausea/Vomiting: no nausea/vomiting    Complications: none    Transfer of care protocol was followedComments: Good SV continue, NAD, VSS, RTRN      Last vitals:   Visit Vitals  BP (!) 122/54 (BP Location: Left arm, Patient Position: Lying)   Pulse 70   Temp 36.1 °C (97 °F) (Skin)   Resp 14   Wt 68.5 kg (151 lb)   SpO2 100%   Breastfeeding No   BMI 30.50 kg/m²

## 2023-01-30 NOTE — ANESTHESIA PREPROCEDURE EVALUATION
"                                                                                                             01/30/2023  Nelida Nicholas is a 67 y.o., female.  Current Outpatient Medications   Medication Sig    atogepant (QULIPTA) 60 mg Tab Take 60 mg by mouth Daily. To prevent migraines    azelastine (ASTELIN) 137 mcg (0.1 %) nasal spray INHALE 1-2 SPRAYS IN EACH NOSTRIL TWICE DAILY AS NEEDED ..SHAKE GENTLY BEFORE USE    benzonatate (TESSALON) 100 MG capsule Take 1 capsule (100 mg total) by mouth 3 (three) times daily as needed for Cough.    brexpiprazole (REXULTI) 2 mg Tab Take 1 tablet by mouth once daily.    carvediloL (COREG) 12.5 MG tablet TAKE 1 TABLET BY MOUTH TWICE DAILY FOR BLOOD PRESSURE &/OR HEART "TAKE WITH FOOD" Strength: 12.5 mg    cetirizine (ZYRTEC) 10 MG tablet Take 1 tablet (10 mg total) by mouth once daily.    colesevelam (WELCHOL) 625 mg tablet One tab TID    dexlansoprazole (DEXILANT) 60 mg capsule Take 1 capsule (60 mg total) by mouth once daily.    diclofenac sodium (VOLTAREN) 1 % Gel Apply 2 g topically 4 (four) times daily as needed.    DULoxetine (CYMBALTA) 60 MG capsule Take 1 capsule (60 mg total) by mouth once daily.    evolocumab (REPATHA SURECLICK) 140 mg/mL PnIj Inject 1 mL (140 mg total) into the skin every 14 (fourteen) days.    flash glucose scanning reader (FREESTYLE GUICHO 2 READER) Misc Use to test BG    flash glucose sensor (FREESTYLE GUICHO 2 SENSOR) Kit Use as directed    fluconazole (DIFLUCAN) 150 MG Tab One tab for yeast infection, may repeat in 3 days if needed    fluticasone propionate (FLONASE) 50 mcg/actuation nasal spray 2 sprays (100 mcg total) by Each Nostril route once daily.    furosemide (LASIX) 20 MG tablet Take 1 tablet (20 mg total) by mouth once daily. Prn fluid    gabapentin (NEURONTIN) 100 MG capsule Take 1 capsule (100 mg total) by mouth 2 (two) times daily.    gabapentin (NEURONTIN) 800 MG tablet Take 1 tablet (800 mg total) by mouth every " evening.    hydrOXYzine pamoate (VISTARIL) 25 MG Cap Take 1 capsule (25 mg total) by mouth every 6 (six) hours as needed (nerves, itching, insomnia, nausea).    insulin aspart U-100 (NOVOLOG) 100 unit/mL injection Inject 50 Units into the skin 3 (three) times daily before meals.    insulin aspart U-100 (NOVOLOG) 100 unit/mL injection Use in insulin pump 50 units daily    ketoconazole (NIZORAL) 2 % cream Apply topically once daily. For at least 2 weeks    latanoprost 0.005 % ophthalmic solution PLACE 1 DROP INTO BOTH EYES AT BEDTIME AS DIRECTED    LINZESS 72 mcg Cap capsule Take 1 capsule (72 mcg total) by mouth once daily.    losartan (COZAAR) 100 MG tablet Take 1 tablet (100 mg total) by mouth once daily.    meloxicam (MOBIC) 7.5 MG tablet Take 1 tablet (7.5 mg total) by mouth once daily.    mirabegron (MYRBETRIQ) 50 mg Tb24 Take 1 tablet (50 mg total) by mouth once daily.    montelukast (SINGULAIR) 10 mg tablet Take 1 tablet (10 mg total) by mouth once daily.    mupirocin (BACTROBAN) 2 % ointment Apply topically 3 (three) times daily.    nystatin (MYCOSTATIN) cream APPLY TO AFFECTED AREA(S) TWICE DAILY AS DIRECTED    oxyCODONE-acetaminophen (PERCOCET)  mg per tablet Take 1 tablet by mouth every 6 (six) hours as needed for Pain. May fill 3/23/23    promethazine (PHENERGAN) 25 MG tablet TAKE 1 TABLET BY MOUTH EVERY 6 HOURS AS NEEDED FOR NAUSEA    REXULTI 2 mg Tab Take 1 tablet by mouth once daily.    silver sulfADIAZINE 1% (SILVADENE) 1 % cream Apply topically once daily. As directed    silver sulfADIAZINE 1% (SILVADENE) 1 % cream Apply topically once daily.    SYNTHROID 150 mcg tablet Take 1 tablet (150 mcg total) by mouth before breakfast.    tiZANidine (ZANAFLEX) 4 MG tablet TAKE 1 TABLET BY MOUTH 4 TIMES A DAY AS NEEDED FOR MUSCLE SPASM .. may cause drowsiness / no alcohol / no driving    traZODone (DESYREL) 150 MG tablet Take 1 tablet (150 mg total) by mouth every evening.     valACYclovir (VALTREX) 500 MG tablet Take 1 tablet (500 mg total) by mouth 2 (two) times daily.    valbenazine (INGREZZA) 40 mg Cap Take 40 mg by mouth once daily.     No current facility-administered medications for this encounter.     Active Ambulatory Problems     Diagnosis Date Noted    Elevated levels of transaminase & lactic acid dehydrogenase     Diabetes mellitus type I     Hypothyroidism     Screening for malignant neoplasm of colon 04/11/2021    Chronic pain syndrome     Elevated liver enzymes 07/22/2021    Abnormal results of liver function studies  07/22/2021    Nausea 07/22/2021    Constipation 08/19/2021    Skin lesion 09/26/2022    Open wound of skin 10/24/2022    Tardive dyskinesia 11/11/2022     Resolved Ambulatory Problems     Diagnosis Date Noted    No Resolved Ambulatory Problems     Past Medical History:   Diagnosis Date    Allergic rhinitis     Back pain, thoracic     Brittle diabetes     Carotid bruit     Chronic anemia     Chronic major depressive disorder, recurrent episode     Chronic migraine w/o aura, not intractable, w/o stat migr     [place] or callus     Coronary arteriosclerosis     Degenerative joint disease involving multiple joints     Encounter for long-term (current) use of insulin     Encounter for long-term (current) use of other medications     Epigastric pain     Fatigue     GERD (gastroesophageal reflux disease)     Hypercalcemia     Hyperkalemia     Hyperlipidemia     Hypo-osmolar hyponatremia     Lumbosacral spondylosis with myelopathy     Memory loss     Osteoporosis     Restless legs syndrome (RLS)     Scoliosis deformity of spine     Sleep disorder     Vitamin D deficiency      Past Surgical History:   Procedure Laterality Date    ARTERIAL DOPPLER  11/07/2017    ARTERIAL DOPPLER DUPLEX Bilateral 11/07/2017    LEGS    BREAST BIOPSY      BYPASS GRAFT N/A 1999    tripple bypass surgery    CAROTID DOPPLER  11/07/2017    COMPLETE     CARPAL TUNNEL RELEASE      CHOLECYSTECTOMY      CORONARY ARTERY BYPASS GRAFT  1997    3 VESSLE BYPASS    ECHOCARDIOGRAPHY  06/27/2012    with doppler color flow    ESOPHAGOGASTRODUODENOSCOPY  03/11/2011    DIL AND BX    FINGER SURGERY      Trigger finger release all 10 fingers    NECK SURGERY      REDUCTION OF BOTH BREASTS      SHOULDER SURGERY      TOTAL REDUCTION MAMMOPLASTY      TUBAL LIGATION           Pre-op Assessment    I have reviewed the Patient Summary Reports.     I have reviewed the Nursing Notes. I have reviewed the NPO Status.   I have reviewed the Medications.     Review of Systems  Anesthesia Hx:  No problems with previous Anesthesia  Denies Family Hx of Anesthesia complications.   Denies Personal Hx of Anesthesia complications.   Social:  Non-Smoker, No Alcohol Use    Hematology/Oncology:     Oncology Normal    -- Anemia:   EENT/Dental:EENT/Dental Normal   Cardiovascular:   Hypertension CAD  CABG/stent (reports bypass surgery in 1999)  hyperlipidemia    Pulmonary:  Pulmonary Normal    Renal/:  Renal/ Normal     Hepatic/GI:   Bowel Prep. GERD    Musculoskeletal:   Arthritis     Neurological:   Headaches   Chronic Pain Syndrome   Endocrine:   Diabetes Hypothyroidism  Obesity / BMI > 30  Dermatological:  Skin Normal    Psych:   Psychiatric History depression          Chemistry        Component Value Date/Time     01/19/2023 1450    K 4.6 01/19/2023 1450     01/19/2023 1450    CO2 29 01/19/2023 1450    BUN 17 01/19/2023 1450    CREATININE 0.90 01/19/2023 1450     (H) 01/19/2023 1450        Component Value Date/Time    CALCIUM 9.4 01/19/2023 1450    ALKPHOS 111 01/19/2023 1450    AST 20 01/19/2023 1450    ALT 19 01/19/2023 1450    BILITOT 0.2 01/19/2023 1450    ESTGFRAFRICA 111 04/09/2021 1624    EGFRNONAA 79 07/27/2022 1617          Lab Results   Component Value Date    WBC 10.86 12/19/2022    HGB 11.1 (L) 12/19/2022    HCT 33.0 (L) 12/19/2022    MCV 92.4 12/19/2022      12/19/2022       Lab Results   Component Value Date    HGBA1C 7.1 (H) 01/19/2023         Physical Exam  General: Well nourished, Cooperative, Alert and Oriented    Airway:  Mallampati: IV   Mouth Opening: < 3 cm  TM Distance: 4 - 6 cm  Tongue: Normal  Neck ROM: Normal ROM    Dental:  Dentures    Chest/Lungs:  Normal Respiratory Rate        Anesthesia Plan  Type of Anesthesia, risks & benefits discussed:    Anesthesia Type: Gen Natural Airway  Intra-op Monitoring Plan: Standard ASA Monitors  Post Op Pain Control Plan: multimodal analgesia  Induction:  IV  Informed Consent: Informed consent signed with the Patient and all parties understand the risks and agree with anesthesia plan.  All questions answered. Patient consented to blood products? Yes  ASA Score: 3  Day of Surgery Review of History & Physical: H&P Update referred to the surgeon/provider.I have interviewed and examined the patient. I have reviewed the patient's H&P dated: There are no significant changes.     Ready For Surgery From Anesthesia Perspective.     .

## 2023-01-30 NOTE — ANESTHESIA POSTPROCEDURE EVALUATION
Anesthesia Post Evaluation    Patient: Nelida Nicholas    Procedure(s) Performed: Colonoscopy     Final Anesthesia Type: general      Patient location during evaluation: GI PACU  Patient participation: Yes- Able to Participate  Level of consciousness: awake and alert  Post-procedure vital signs: reviewed and stable  Pain management: adequate  Airway patency: patent    PONV status at discharge: No PONV  Anesthetic complications: no      Cardiovascular status: blood pressure returned to baseline and hemodynamically stable  Respiratory status: spontaneous ventilation  Hydration status: euvolemic  Follow-up not needed.  Comments: Pt voices appreciation for care          Vitals Value Taken Time   /56 01/30/23 0921   Temp 36.1 °C (97 °F) 01/30/23 0908   Pulse 77 01/30/23 0921   Resp 12 01/30/23 0921   SpO2 99 % 01/30/23 0921   Vitals shown include unvalidated device data.      No case tracking events are documented in the log.      Pain/Benny Score: Benny Score: 8 (1/30/2023  9:14 AM)

## 2023-01-30 NOTE — H&P
[facility]  Gastroenterology  H&P    Patient Name: Nelida Nicholas  MRN: [id number]  Admission Date: 1/30/2023  Code Status: No Order    Attending Provider: Garrett Lynne MD   Primary Care Physician: Gisela Talbot MD  Principal Problem:<principal problem not specified>    Subjective:     History of Present Illness: Pt has family history of colon polyps. Her last colonoscopy was 2011.    Past Medical History:   Diagnosis Date    Allergic rhinitis     Back pain, thoracic     Brittle diabetes     Carotid bruit     right    Chronic anemia     Chronic major depressive disorder, recurrent episode     Chronic migraine w/o aura, not intractable, w/o stat migr     Chronic pain syndrome     Constipation     [place] or callus     Coronary arteriosclerosis     S/P CABBAGE 1997    Degenerative joint disease involving multiple joints     Diabetes mellitus type I     Elevated levels of transaminase & lactic acid dehydrogenase     Encounter for long-term (current) use of insulin     Encounter for long-term (current) use of other medications     Epigastric pain     Fatigue     GERD (gastroesophageal reflux disease)     Hypercalcemia     Hyperkalemia     Hyperlipidemia     Hypo-osmolar hyponatremia     Hypothyroidism     Lumbosacral spondylosis with myelopathy     Memory loss     Osteoporosis     Restless legs syndrome (RLS)     Scoliosis deformity of spine     Sleep disorder     Vitamin D deficiency        Past Surgical History:   Procedure Laterality Date    ARTERIAL DOPPLER  11/07/2017    ARTERIAL DOPPLER DUPLEX Bilateral 11/07/2017    LEGS    BREAST BIOPSY      BYPASS GRAFT N/A 1999    tripple bypass surgery    CAROTID DOPPLER  11/07/2017    COMPLETE    CARPAL TUNNEL RELEASE      CHOLECYSTECTOMY      CORONARY ARTERY BYPASS GRAFT  1997    3 VESSLE BYPASS    ECHOCARDIOGRAPHY  06/27/2012    with doppler color flow    ESOPHAGOGASTRODUODENOSCOPY  03/11/2011    DIL AND BX    FINGER SURGERY      Trigger finger release all  10 fingers    NECK SURGERY      REDUCTION OF BOTH BREASTS      SHOULDER SURGERY      TOTAL REDUCTION MAMMOPLASTY      TUBAL LIGATION         Review of patient's allergies indicates:   Allergen Reactions    Klonopin [clonazepam]     Nalbuphine     Opioids-meperidine and related     Statins-hmg-coa reductase inhibitors Other (See Comments)     Elevated Liver Enzymes    Toradol [ketorolac]      Family History       Problem Relation (Age of Onset)    Breast cancer Maternal Aunt    Heart disease Father, Maternal Grandmother, Paternal Grandfather    Hyperlipidemia Father    Hypertension Mother, Father, Brother          Tobacco Use    Smoking status: Former     Types: Cigarettes     Start date:      Quit date:      Years since quittin.1    Smokeless tobacco: Never    Tobacco comments:     1 pack per week   Substance and Sexual Activity    Alcohol use: Never    Drug use: Never    Sexual activity: Not Currently     Review of Systems   Respiratory: Negative.     Cardiovascular: Negative.    Gastrointestinal: Negative.    Musculoskeletal:  Positive for arthralgias.   Objective:     Vital Signs (Most Recent):  Pulse: 80 (23)  Resp: 20 (23)  BP: (!) 166/62 (23)  SpO2: 98 % (23)   Vital Signs (24h Range):  Pulse:  [80] 80  Resp:  [20] 20  SpO2:  [98 %] 98 %  BP: (166)/(62) 166/62     Weight: 68.5 kg (151 lb) (23)  Body mass index is 30.5 kg/m².    No intake or output data in the 24 hours ending 23 0856    Lines/Drains/Airways       Peripheral Intravenous Line  Duration                  Peripheral IV - Single Lumen 23 0804 Anterior;Proximal;Right Forearm <1 day                    Physical Exam  Vitals reviewed.   Constitutional:       General: She is not in acute distress.     Appearance: Normal appearance. She is well-developed. She is ill-appearing.   HENT:      Head: Normocephalic and atraumatic.      Nose: Nose normal.   Eyes:      Pupils: Pupils  are equal, round, and reactive to light.   Cardiovascular:      Rate and Rhythm: Normal rate and regular rhythm.   Pulmonary:      Effort: Pulmonary effort is normal.      Breath sounds: Normal breath sounds. No wheezing.   Abdominal:      General: Abdomen is flat. Bowel sounds are normal. There is no distension.      Palpations: Abdomen is soft.      Tenderness: There is no abdominal tenderness. There is no guarding.   Skin:     General: Skin is warm and dry.      Coloration: Skin is not jaundiced.   Neurological:      Mental Status: She is alert.   Psychiatric:         Attention and Perception: Attention normal.         Mood and Affect: Affect normal.         Speech: Speech normal.         Behavior: Behavior is cooperative.      Comments: Pt was calm while speaking.       Significant Labs:  CBC: [unfilled]  CMP: [unfilled]    Significant Imaging:  Imaging results within the past 24 hours have been reviewed.    Assessment/Plan:     There are no hospital problems to display for this patient.        Imp: family hx of colon polyps  Plan: colonoscopy    Garrett Lynne MD  Gastroenterology  [facility]

## 2023-01-31 ENCOUNTER — EXTERNAL CHRONIC CARE MANAGEMENT (OUTPATIENT)
Dept: FAMILY MEDICINE | Facility: CLINIC | Age: 68
End: 2023-01-31
Payer: MEDICARE

## 2023-01-31 PROCEDURE — G0511 CCM/BHI BY RHC/FQHC 20MIN MO: HCPCS | Mod: ,,, | Performed by: FAMILY MEDICINE

## 2023-01-31 PROCEDURE — G0511 PR CHRONIC CARE MGMT, RHC OR FQHC ONLY, 20 MINS OR MORE: ICD-10-PCS | Mod: ,,, | Performed by: FAMILY MEDICINE

## 2023-02-28 ENCOUNTER — EXTERNAL CHRONIC CARE MANAGEMENT (OUTPATIENT)
Dept: FAMILY MEDICINE | Facility: CLINIC | Age: 68
End: 2023-02-28
Payer: MEDICARE

## 2023-02-28 PROCEDURE — G0511 CCM/BHI BY RHC/FQHC 20MIN MO: HCPCS | Mod: ,,, | Performed by: FAMILY MEDICINE

## 2023-02-28 PROCEDURE — G0511 PR CHRONIC CARE MGMT, RHC OR FQHC ONLY, 20 MINS OR MORE: ICD-10-PCS | Mod: ,,, | Performed by: FAMILY MEDICINE

## 2023-03-01 ENCOUNTER — TELEPHONE (OUTPATIENT)
Dept: FAMILY MEDICINE | Facility: CLINIC | Age: 68
End: 2023-03-01
Payer: MEDICARE

## 2023-03-02 RX ORDER — LEVOTHYROXINE SODIUM 150 MCG
150 TABLET ORAL
Qty: 90 TABLET | Refills: 3 | Status: SHIPPED | OUTPATIENT
Start: 2023-03-02 | End: 2023-06-02 | Stop reason: SDUPTHER

## 2023-03-02 RX ORDER — MELOXICAM 7.5 MG/1
7.5 TABLET ORAL DAILY
Qty: 90 TABLET | Refills: 3 | Status: SHIPPED | OUTPATIENT
Start: 2023-03-02

## 2023-03-02 RX ORDER — FUROSEMIDE 20 MG/1
20 TABLET ORAL DAILY
Qty: 30 TABLET | Refills: 11 | Status: SHIPPED | OUTPATIENT
Start: 2023-03-02 | End: 2023-11-30

## 2023-03-02 RX ORDER — GABAPENTIN 100 MG/1
100 CAPSULE ORAL 2 TIMES DAILY
Qty: 180 CAPSULE | Refills: 3 | Status: SHIPPED | OUTPATIENT
Start: 2023-03-02

## 2023-03-02 RX ORDER — CETIRIZINE HYDROCHLORIDE 10 MG/1
10 TABLET ORAL DAILY
Qty: 90 TABLET | Refills: 3 | Status: SHIPPED | OUTPATIENT
Start: 2023-03-02

## 2023-03-02 RX ORDER — TIZANIDINE 4 MG/1
TABLET ORAL
Qty: 360 TABLET | Refills: 3 | Status: SHIPPED | OUTPATIENT
Start: 2023-03-02 | End: 2024-03-27 | Stop reason: SDUPTHER

## 2023-03-02 RX ORDER — MONTELUKAST SODIUM 10 MG/1
10 TABLET ORAL DAILY
Qty: 90 TABLET | Refills: 3 | Status: SHIPPED | OUTPATIENT
Start: 2023-03-02 | End: 2023-04-10 | Stop reason: SDUPTHER

## 2023-03-02 RX ORDER — CARVEDILOL 12.5 MG/1
TABLET ORAL
Qty: 180 TABLET | Refills: 3 | Status: SHIPPED | OUTPATIENT
Start: 2023-03-02

## 2023-03-02 RX ORDER — EVOLOCUMAB 140 MG/ML
140 INJECTION, SOLUTION SUBCUTANEOUS
Qty: 6 EACH | Refills: 3 | Status: SHIPPED | OUTPATIENT
Start: 2023-03-02 | End: 2023-09-26

## 2023-03-02 RX ORDER — AZELASTINE 1 MG/ML
SPRAY, METERED NASAL
Qty: 90 ML | Refills: 3 | Status: SHIPPED | OUTPATIENT
Start: 2023-03-02 | End: 2024-03-25

## 2023-03-02 RX ORDER — MIRABEGRON 50 MG/1
1 TABLET, FILM COATED, EXTENDED RELEASE ORAL DAILY
Qty: 90 TABLET | Refills: 3 | Status: SHIPPED | OUTPATIENT
Start: 2023-03-02 | End: 2023-04-10 | Stop reason: SDUPTHER

## 2023-03-02 RX ORDER — DEXLANSOPRAZOLE 60 MG/1
60 CAPSULE, DELAYED RELEASE ORAL DAILY
Qty: 90 CAPSULE | Refills: 3 | Status: SHIPPED | OUTPATIENT
Start: 2023-03-02 | End: 2023-06-08

## 2023-03-02 RX ORDER — TRAZODONE HYDROCHLORIDE 150 MG/1
150 TABLET ORAL NIGHTLY
Qty: 90 TABLET | Refills: 3 | Status: SHIPPED | OUTPATIENT
Start: 2023-03-02 | End: 2023-04-10 | Stop reason: SDUPTHER

## 2023-03-02 RX ORDER — LOSARTAN POTASSIUM 100 MG/1
100 TABLET ORAL DAILY
Qty: 90 TABLET | Refills: 3 | Status: SHIPPED | OUTPATIENT
Start: 2023-03-02 | End: 2024-02-15 | Stop reason: SDUPTHER

## 2023-03-02 RX ORDER — BENZONATATE 100 MG/1
100 CAPSULE ORAL 3 TIMES DAILY PRN
Qty: 90 CAPSULE | Refills: 5 | Status: SHIPPED | OUTPATIENT
Start: 2023-03-02 | End: 2023-03-30

## 2023-03-02 RX ORDER — TIZANIDINE 4 MG/1
TABLET ORAL
Qty: 360 TABLET | Refills: 3 | Status: SHIPPED | OUTPATIENT
Start: 2023-03-02 | End: 2023-03-02 | Stop reason: SDUPTHER

## 2023-03-02 RX ORDER — COLESEVELAM 180 1/1
TABLET ORAL
Qty: 270 TABLET | Refills: 3 | Status: SHIPPED | OUTPATIENT
Start: 2023-03-02 | End: 2024-03-14 | Stop reason: SDUPTHER

## 2023-03-02 RX ORDER — HYDROXYZINE PAMOATE 25 MG/1
25 CAPSULE ORAL EVERY 6 HOURS PRN
Qty: 360 CAPSULE | Refills: 3 | Status: SHIPPED | OUTPATIENT
Start: 2023-03-02 | End: 2024-03-25

## 2023-03-02 RX ORDER — FLUTICASONE PROPIONATE 50 MCG
2 SPRAY, SUSPENSION (ML) NASAL DAILY
Qty: 48 G | Refills: 3 | Status: SHIPPED | OUTPATIENT
Start: 2023-03-02 | End: 2024-02-15 | Stop reason: SDUPTHER

## 2023-03-02 RX ORDER — GABAPENTIN 800 MG/1
800 TABLET ORAL NIGHTLY
Qty: 90 TABLET | Refills: 3 | Status: SHIPPED | OUTPATIENT
Start: 2023-03-02

## 2023-03-02 RX ORDER — LOSARTAN POTASSIUM 100 MG/1
100 TABLET ORAL DAILY
Qty: 90 TABLET | Refills: 3 | Status: SHIPPED | OUTPATIENT
Start: 2023-03-02 | End: 2023-03-02 | Stop reason: SDUPTHER

## 2023-03-02 RX ORDER — DULOXETIN HYDROCHLORIDE 60 MG/1
60 CAPSULE, DELAYED RELEASE ORAL DAILY
Qty: 90 CAPSULE | Refills: 3 | Status: SHIPPED | OUTPATIENT
Start: 2023-03-02 | End: 2024-02-15 | Stop reason: SDUPTHER

## 2023-03-02 RX ORDER — INSULIN ASPART 100 [IU]/ML
INJECTION, SOLUTION INTRAVENOUS; SUBCUTANEOUS
Qty: 6 EACH | Refills: 11 | Status: SHIPPED | OUTPATIENT
Start: 2023-03-02 | End: 2023-06-08 | Stop reason: SDUPTHER

## 2023-03-02 RX ORDER — LINACLOTIDE 72 UG/1
72 CAPSULE, GELATIN COATED ORAL DAILY
Qty: 90 CAPSULE | Refills: 3 | Status: SHIPPED | OUTPATIENT
Start: 2023-03-02 | End: 2024-02-15 | Stop reason: SDUPTHER

## 2023-03-16 ENCOUNTER — TELEPHONE (OUTPATIENT)
Dept: FAMILY MEDICINE | Facility: CLINIC | Age: 68
End: 2023-03-16
Payer: MEDICARE

## 2023-03-16 NOTE — TELEPHONE ENCOUNTER
Patient requests rx for Ingrezza sent to [facility]. She also asked if you know of any payment assistance to help her pay the copay for this med it is $1600.

## 2023-03-17 RX ORDER — VALBENAZINE 40 MG/1
40 CAPSULE ORAL DAILY
Qty: 30 CAPSULE | Refills: 0 | Status: SHIPPED | OUTPATIENT
Start: 2023-03-17 | End: 2023-06-08

## 2023-03-29 ENCOUNTER — OFFICE VISIT (OUTPATIENT)
Dept: FAMILY MEDICINE | Facility: CLINIC | Age: 68
End: 2023-03-29
Payer: MEDICARE

## 2023-03-29 VITALS
DIASTOLIC BLOOD PRESSURE: 70 MMHG | WEIGHT: 148 LBS | RESPIRATION RATE: 16 BRPM | OXYGEN SATURATION: 96 % | HEIGHT: 59 IN | BODY MASS INDEX: 29.84 KG/M2 | HEART RATE: 60 BPM | SYSTOLIC BLOOD PRESSURE: 136 MMHG | TEMPERATURE: 98 F

## 2023-03-29 DIAGNOSIS — R74.01 ELEVATED LEVELS OF TRANSAMINASE & LACTIC ACID DEHYDROGENASE: ICD-10-CM

## 2023-03-29 DIAGNOSIS — E78.2 MIXED HYPERLIPIDEMIA: ICD-10-CM

## 2023-03-29 DIAGNOSIS — Z79.899 ENCOUNTER FOR LONG-TERM (CURRENT) USE OF OTHER MEDICATIONS: ICD-10-CM

## 2023-03-29 DIAGNOSIS — D53.9 NUTRITIONAL ANEMIA: ICD-10-CM

## 2023-03-29 DIAGNOSIS — M54.6 CHRONIC MIDLINE THORACIC BACK PAIN: ICD-10-CM

## 2023-03-29 DIAGNOSIS — E03.9 HYPOTHYROIDISM, UNSPECIFIED TYPE: ICD-10-CM

## 2023-03-29 DIAGNOSIS — R74.02 ELEVATED LEVELS OF TRANSAMINASE & LACTIC ACID DEHYDROGENASE: ICD-10-CM

## 2023-03-29 DIAGNOSIS — M15.9 PRIMARY OSTEOARTHRITIS INVOLVING MULTIPLE JOINTS: ICD-10-CM

## 2023-03-29 DIAGNOSIS — T14.8XXA OPEN WOUND OF SKIN: ICD-10-CM

## 2023-03-29 DIAGNOSIS — G89.4 CHRONIC PAIN SYNDROME: Primary | ICD-10-CM

## 2023-03-29 DIAGNOSIS — E10.649 TYPE 1 DIABETES MELLITUS WITH HYPOGLYCEMIA AND WITHOUT COMA: ICD-10-CM

## 2023-03-29 DIAGNOSIS — G89.29 CHRONIC MIDLINE THORACIC BACK PAIN: ICD-10-CM

## 2023-03-29 LAB
ALBUMIN SERPL BCP-MCNC: 3.3 G/DL (ref 3.5–5)
ALBUMIN/GLOB SERPL: 0.9 {RATIO}
ALP SERPL-CCNC: 102 U/L (ref 55–142)
ALT SERPL W P-5'-P-CCNC: 22 U/L (ref 13–56)
ANION GAP SERPL CALCULATED.3IONS-SCNC: 8 MMOL/L (ref 7–16)
AST SERPL W P-5'-P-CCNC: 26 U/L (ref 15–37)
BASOPHILS # BLD AUTO: 0.06 K/UL (ref 0–0.2)
BASOPHILS NFR BLD AUTO: 0.7 % (ref 0–1)
BILIRUB SERPL-MCNC: 0.1 MG/DL (ref ?–1.2)
BUN SERPL-MCNC: 10 MG/DL (ref 7–18)
BUN/CREAT SERPL: 11 (ref 6–20)
CALCIUM SERPL-MCNC: 9.3 MG/DL (ref 8.5–10.1)
CHLORIDE SERPL-SCNC: 105 MMOL/L (ref 98–107)
CO2 SERPL-SCNC: 30 MMOL/L (ref 21–32)
CREAT SERPL-MCNC: 0.94 MG/DL (ref 0.55–1.02)
CTP QC/QA: YES
DIFFERENTIAL METHOD BLD: ABNORMAL
EGFR (NO RACE VARIABLE) (RUSH/TITUS): 66 ML/MIN/1.73M²
EOSINOPHIL # BLD AUTO: 0.44 K/UL (ref 0–0.5)
EOSINOPHIL NFR BLD AUTO: 5 % (ref 1–4)
ERYTHROCYTE [DISTWIDTH] IN BLOOD BY AUTOMATED COUNT: 12.8 % (ref 11.5–14.5)
GLOBULIN SER-MCNC: 3.8 G/DL (ref 2–4)
GLUCOSE SERPL-MCNC: 149 MG/DL (ref 74–106)
HCT VFR BLD AUTO: 34.9 % (ref 38–47)
HGB BLD-MCNC: 11.5 G/DL (ref 12–16)
IMM GRANULOCYTES # BLD AUTO: 0.04 K/UL (ref 0–0.04)
IMM GRANULOCYTES NFR BLD: 0.5 % (ref 0–0.4)
LYMPHOCYTES # BLD AUTO: 2.06 K/UL (ref 1–4.8)
LYMPHOCYTES NFR BLD AUTO: 23.2 % (ref 27–41)
MCH RBC QN AUTO: 30.7 PG (ref 27–31)
MCHC RBC AUTO-ENTMCNC: 33 G/DL (ref 32–36)
MCV RBC AUTO: 93.1 FL (ref 80–96)
MONOCYTES # BLD AUTO: 0.53 K/UL (ref 0–0.8)
MONOCYTES NFR BLD AUTO: 6 % (ref 2–6)
MPC BLD CALC-MCNC: 11.4 FL (ref 9.4–12.4)
NEUTROPHILS # BLD AUTO: 5.74 K/UL (ref 1.8–7.7)
NEUTROPHILS NFR BLD AUTO: 64.6 % (ref 53–65)
NRBC # BLD AUTO: 0 X10E3/UL
NRBC, AUTO (.00): 0 %
PLATELET # BLD AUTO: 278 K/UL (ref 150–400)
POC (AMP) AMPHETAMINE: NEGATIVE
POC (BAR) BARBITURATES: NEGATIVE
POC (BUP) BUPRENORPHINE: NEGATIVE
POC (BZO) BENZODIAZEPINES: NEGATIVE
POC (COC) COCAINE: NEGATIVE
POC (MDMA) METHYLENEDIOXYMETHAMPHETAMINE 3,4: NEGATIVE
POC (MET) METHAMPHETAMINE: NEGATIVE
POC (MOP) OPIATES: ABNORMAL
POC (MTD) METHADONE: NEGATIVE
POC (OXY) OXYCODONE: ABNORMAL
POC (PCP) PHENCYCLIDINE: NEGATIVE
POC (TCA) TRICYCLIC ANTIDEPRESSANTS: ABNORMAL
POC TEMPERATURE (URINE): 90
POC THC: NEGATIVE
POTASSIUM SERPL-SCNC: 4.1 MMOL/L (ref 3.5–5.1)
PROT SERPL-MCNC: 7.1 G/DL (ref 6.4–8.2)
RBC # BLD AUTO: 3.75 M/UL (ref 4.2–5.4)
SODIUM SERPL-SCNC: 139 MMOL/L (ref 136–145)
T4 FREE SERPL-MCNC: 1.09 NG/DL (ref 0.76–1.46)
TSH SERPL DL<=0.005 MIU/L-ACNC: 3.66 UIU/ML (ref 0.36–3.74)
WBC # BLD AUTO: 8.87 K/UL (ref 4.5–11)

## 2023-03-29 PROCEDURE — 84443 TSH: ICD-10-PCS | Mod: ,,, | Performed by: CLINICAL MEDICAL LABORATORY

## 2023-03-29 PROCEDURE — 83036 HEMOGLOBIN A1C: ICD-10-PCS | Mod: ,,, | Performed by: CLINICAL MEDICAL LABORATORY

## 2023-03-29 PROCEDURE — 80305 DRUG TEST PRSMV DIR OPT OBS: CPT | Mod: RHCUB | Performed by: FAMILY MEDICINE

## 2023-03-29 PROCEDURE — 99214 PR OFFICE/OUTPT VISIT, EST, LEVL IV, 30-39 MIN: ICD-10-PCS | Mod: ,,, | Performed by: FAMILY MEDICINE

## 2023-03-29 PROCEDURE — 99214 OFFICE O/P EST MOD 30 MIN: CPT | Mod: ,,, | Performed by: FAMILY MEDICINE

## 2023-03-29 PROCEDURE — 80053 COMPREHEN METABOLIC PANEL: CPT | Mod: ,,, | Performed by: CLINICAL MEDICAL LABORATORY

## 2023-03-29 PROCEDURE — 84443 ASSAY THYROID STIM HORMONE: CPT | Mod: ,,, | Performed by: CLINICAL MEDICAL LABORATORY

## 2023-03-29 PROCEDURE — 85025 CBC WITH DIFFERENTIAL: ICD-10-PCS | Mod: ,,, | Performed by: CLINICAL MEDICAL LABORATORY

## 2023-03-29 PROCEDURE — 84439 ASSAY OF FREE THYROXINE: CPT | Mod: ,,, | Performed by: CLINICAL MEDICAL LABORATORY

## 2023-03-29 PROCEDURE — 83036 HEMOGLOBIN GLYCOSYLATED A1C: CPT | Mod: ,,, | Performed by: CLINICAL MEDICAL LABORATORY

## 2023-03-29 PROCEDURE — 84439 T4, FREE: ICD-10-PCS | Mod: ,,, | Performed by: CLINICAL MEDICAL LABORATORY

## 2023-03-29 PROCEDURE — 80053 COMPREHENSIVE METABOLIC PANEL: ICD-10-PCS | Mod: ,,, | Performed by: CLINICAL MEDICAL LABORATORY

## 2023-03-29 PROCEDURE — 85025 COMPLETE CBC W/AUTO DIFF WBC: CPT | Mod: ,,, | Performed by: CLINICAL MEDICAL LABORATORY

## 2023-03-29 RX ORDER — OXYCODONE AND ACETAMINOPHEN 10; 325 MG/1; MG/1
1 TABLET ORAL EVERY 6 HOURS PRN
Qty: 120 TABLET | Refills: 0 | Status: SHIPPED | OUTPATIENT
Start: 2023-03-29 | End: 2023-07-17 | Stop reason: SDUPTHER

## 2023-03-29 RX ORDER — OXYCODONE AND ACETAMINOPHEN 10; 325 MG/1; MG/1
1 TABLET ORAL EVERY 6 HOURS PRN
Qty: 120 TABLET | Refills: 0 | Status: SHIPPED | OUTPATIENT
Start: 2023-03-29 | End: 2023-03-29 | Stop reason: SDUPTHER

## 2023-03-29 NOTE — PROGRESS NOTES
Subjective     Patient ID: Nelida Nicholas is a 67 y.o. female.    Chief Complaint: Follow-up and Medication Refill (3 month)    F/u for med refill and labs.  Still checks her sugar about 8 times a day due to being type 1 on an insulin pump and having highs and lows.  Wants me to recheck the wound, it is burning and itching.    Review of Systems   Constitutional:  Negative for appetite change, chills, fatigue, fever and unexpected weight change.   Respiratory:  Negative for cough and shortness of breath.    Cardiovascular:  Negative for chest pain and leg swelling.   Gastrointestinal:  Negative for abdominal pain.   Musculoskeletal:  Positive for arthralgias and myalgias.   Integumentary:  Positive for rash and wound.   Neurological:  Negative for weakness.   Psychiatric/Behavioral:  The patient is not nervous/anxious.         Objective     Physical Exam  Constitutional:       General: She is not in acute distress.     Appearance: Normal appearance.   Cardiovascular:      Rate and Rhythm: Normal rate and regular rhythm.      Heart sounds: Normal heart sounds.   Pulmonary:      Breath sounds: Normal breath sounds.   Abdominal:      General: Abdomen is flat.      Palpations: Abdomen is soft.   Skin:     General: Skin is warm and dry.      Findings: Erythema, lesion and rash present.      Comments: Lesion on buttock is much worse than last time I saw it.  It had nearly healed up but now is not only bigger but has spread to the other buttock cheek.  Erythema and raw macules   Neurological:      Mental Status: She is alert. Mental status is at baseline.   Psychiatric:         Mood and Affect: Mood normal.         Behavior: Behavior normal.         Thought Content: Thought content normal.         Judgment: Judgment normal.          Assessment and Plan   1. Chronic pain syndrome        2. Type 1 diabetes mellitus with hypoglycemia and without coma  Hemoglobin A1C    Comprehensive Metabolic Panel    Hemoglobin A1C     Comprehensive Metabolic Panel      3. Encounter for long-term (current) use of other medications  Hemoglobin A1C    Comprehensive Metabolic Panel    CBC Auto Differential    POCT Urine Drug Screen Presump    Hemoglobin A1C    Comprehensive Metabolic Panel    CBC Auto Differential      4. Elevated levels of transaminase & lactic acid dehydrogenase  Comprehensive Metabolic Panel    CBC Auto Differential    Comprehensive Metabolic Panel    CBC Auto Differential      5. Nutritional anemia  CBC Auto Differential    CBC Auto Differential      6. Hypothyroidism, unspecified type  CBC Auto Differential    T4, Free    TSH    CBC Auto Differential    T4, Free    TSH      7. Mixed hyperlipidemia  Comprehensive Metabolic Panel    Comprehensive Metabolic Panel      8. Open wound of skin  Ambulatory referral/consult to Plastic Surgery      9. Primary osteoarthritis involving multiple joints            Plan:  Will see if Dr. Razo would take over her pain meds.  She saw him before when he was at [facility].  She has been stable on current regimen for years, it successfully treats her pain so she can perform her ADLs.  She is legally blind due to her type 1 diabetes, cannot drive, has to depend on her brother for transportation.  It will be difficult to go somewhere monthly for her refills.    UDS and  reviewed, ok for refills.   Labs today  Refer back to Fernando for worsening of wound on buttock.

## 2023-03-30 LAB
EST. AVERAGE GLUCOSE BLD GHB EST-MCNC: 170 MG/DL
HBA1C MFR BLD HPLC: 7.7 % (ref 4.5–6.6)

## 2023-03-31 ENCOUNTER — EXTERNAL CHRONIC CARE MANAGEMENT (OUTPATIENT)
Dept: FAMILY MEDICINE | Facility: CLINIC | Age: 68
End: 2023-03-31
Payer: MEDICARE

## 2023-03-31 PROCEDURE — G0511 PR CHRONIC CARE MGMT, RHC OR FQHC ONLY, 20 MINS OR MORE: ICD-10-PCS | Mod: ,,, | Performed by: FAMILY MEDICINE

## 2023-03-31 PROCEDURE — G0511 CCM/BHI BY RHC/FQHC 20MIN MO: HCPCS | Mod: ,,, | Performed by: FAMILY MEDICINE

## 2023-04-10 RX ORDER — MONTELUKAST SODIUM 10 MG/1
10 TABLET ORAL DAILY
Qty: 90 TABLET | Refills: 3 | Status: SHIPPED | OUTPATIENT
Start: 2023-04-10

## 2023-04-10 RX ORDER — TRAZODONE HYDROCHLORIDE 150 MG/1
150 TABLET ORAL NIGHTLY
Qty: 90 TABLET | Refills: 3 | Status: SHIPPED | OUTPATIENT
Start: 2023-04-10

## 2023-04-10 RX ORDER — MIRABEGRON 50 MG/1
1 TABLET, FILM COATED, EXTENDED RELEASE ORAL DAILY
Qty: 90 TABLET | Refills: 3 | Status: SHIPPED | OUTPATIENT
Start: 2023-04-10

## 2023-04-25 LAB
LEFT EYE DM RETINOPATHY: POSITIVE
RIGHT EYE DM RETINOPATHY: POSITIVE

## 2023-04-28 ENCOUNTER — TELEPHONE (OUTPATIENT)
Dept: OBSTETRICS AND GYNECOLOGY | Facility: CLINIC | Age: 68
End: 2023-04-28
Payer: MEDICARE

## 2023-05-23 ENCOUNTER — PATIENT MESSAGE (OUTPATIENT)
Dept: RESEARCH | Facility: HOSPITAL | Age: 68
End: 2023-05-23

## 2023-05-25 ENCOUNTER — OFFICE VISIT (OUTPATIENT)
Dept: OBSTETRICS AND GYNECOLOGY | Facility: CLINIC | Age: 68
End: 2023-05-25
Payer: MEDICARE

## 2023-05-25 VITALS
HEART RATE: 77 BPM | DIASTOLIC BLOOD PRESSURE: 75 MMHG | OXYGEN SATURATION: 97 % | SYSTOLIC BLOOD PRESSURE: 147 MMHG | BODY MASS INDEX: 29.27 KG/M2 | WEIGHT: 145.19 LBS | TEMPERATURE: 98 F | HEIGHT: 59 IN

## 2023-05-25 DIAGNOSIS — Z76.0 MEDICATION REFILL: ICD-10-CM

## 2023-05-25 DIAGNOSIS — Z01.419 WOMEN'S ANNUAL ROUTINE GYNECOLOGICAL EXAMINATION: Primary | ICD-10-CM

## 2023-05-25 DIAGNOSIS — N76.3 CHRONIC VULVITIS: ICD-10-CM

## 2023-05-25 DIAGNOSIS — N95.2 ATROPHIC VAGINITIS: ICD-10-CM

## 2023-05-25 LAB
CANDIDA SPECIES: NEGATIVE
GARDNERELLA: POSITIVE
TRICHOMONAS: NEGATIVE

## 2023-05-25 PROCEDURE — 87480 BACTERIAL VAGINOSIS: ICD-10-PCS | Mod: ,,, | Performed by: CLINICAL MEDICAL LABORATORY

## 2023-05-25 PROCEDURE — 87660 TRICHOMONAS VAGIN DIR PROBE: CPT | Mod: ,,, | Performed by: CLINICAL MEDICAL LABORATORY

## 2023-05-25 PROCEDURE — G0101 PR CA SCREEN;PELVIC/BREAST EXAM: ICD-10-PCS | Mod: GZ,,, | Performed by: ADVANCED PRACTICE MIDWIFE

## 2023-05-25 PROCEDURE — 87480 CANDIDA DNA DIR PROBE: CPT | Mod: ,,, | Performed by: CLINICAL MEDICAL LABORATORY

## 2023-05-25 PROCEDURE — 87510 GARDNER VAG DNA DIR PROBE: CPT | Mod: ,,, | Performed by: CLINICAL MEDICAL LABORATORY

## 2023-05-25 PROCEDURE — 87510 BACTERIAL VAGINOSIS: ICD-10-PCS | Mod: ,,, | Performed by: CLINICAL MEDICAL LABORATORY

## 2023-05-25 PROCEDURE — G0101 CA SCREEN;PELVIC/BREAST EXAM: HCPCS | Mod: GZ,,, | Performed by: ADVANCED PRACTICE MIDWIFE

## 2023-05-25 PROCEDURE — 87660 BACTERIAL VAGINOSIS: ICD-10-PCS | Mod: ,,, | Performed by: CLINICAL MEDICAL LABORATORY

## 2023-05-25 RX ORDER — SILVER SULFADIAZINE 10 G/1000G
CREAM TOPICAL DAILY
Qty: 50 G | Refills: 1 | Status: SHIPPED | OUTPATIENT
Start: 2023-05-25

## 2023-05-29 PROBLEM — N95.2 ATROPHIC VAGINITIS: Status: ACTIVE | Noted: 2023-05-29

## 2023-05-29 PROBLEM — N76.3 CHRONIC VULVITIS: Status: ACTIVE | Noted: 2023-05-29

## 2023-05-30 DIAGNOSIS — N76.0 BACTERIAL VAGINAL INFECTION: Primary | ICD-10-CM

## 2023-05-30 DIAGNOSIS — B96.89 BACTERIAL VAGINAL INFECTION: Primary | ICD-10-CM

## 2023-05-30 RX ORDER — FLUCONAZOLE 150 MG/1
150 TABLET ORAL
Qty: 2 TABLET | Refills: 0 | Status: SHIPPED | OUTPATIENT
Start: 2023-05-30 | End: 2023-06-07

## 2023-05-30 RX ORDER — METRONIDAZOLE 500 MG/1
500 TABLET ORAL 2 TIMES DAILY
Qty: 14 TABLET | Refills: 0 | Status: SHIPPED | OUTPATIENT
Start: 2023-05-30 | End: 2023-06-06

## 2023-05-30 NOTE — PROGRESS NOTES
Advise she tested positive for  bacterial vaginosis which is not an STD. I have sent prescriptions to her pharmacy for Flagyl and Diflucan.

## 2023-05-30 NOTE — PROGRESS NOTES
Patient ID: Nelida Nicholas is a 67 y.o. female     Chief Complaint:   Chief Complaint   Patient presents with    Annual Exam     Rash and lesions on vaginal and lower end  Last pap: unknown . Patient states a year ago     overactive bladder        HPI: Nelida Nicholas is a 67 y.o. female who presents as a new patient for well woman exam with Pap. Unsure of last Pap, denies hx abnormal. C/O pain from wound on sacral area/buttocks, has had wound x 1 year or longer. Has seen PCP Dr. Talbot and has a follow up appointment scheduled with Dr. King in 2 weeks for wound management. Requesting refill of silvadene cream she uses on wound. Denies gyn problems, no PMB. Has an extensive medical hx as listed below.   LMP: No LMP recorded. Patient is postmenopausal.  Sexually active: no   Contraceptive: n/a  Mammogram: negative 3/31/23, scheduled for yearly mammogram    Past Medical History:   Diagnosis Date    Allergic rhinitis     Back pain, thoracic     Brittle diabetes     Carotid bruit     right    Chronic anemia     Chronic major depressive disorder, recurrent episode     Chronic migraine w/o aura, not intractable, w/o stat migr     Chronic pain syndrome     Constipation     [place] or callus     Coronary arteriosclerosis     S/P CABBAGE 1997    Degenerative joint disease involving multiple joints     Diabetes mellitus type I     Elevated levels of transaminase & lactic acid dehydrogenase     Encounter for long-term (current) use of insulin     Encounter for long-term (current) use of other medications     Epigastric pain     Fatigue     GERD (gastroesophageal reflux disease)     Hypercalcemia     Hyperkalemia     Hyperlipidemia     Hypo-osmolar hyponatremia     Hypothyroidism     Lumbosacral spondylosis with myelopathy     Memory loss     Osteoporosis     Restless legs syndrome (RLS)     Scoliosis deformity of spine     Sleep disorder     Vitamin D deficiency        Past Surgical History:   Procedure Laterality Date     "ARTERIAL DOPPLER  2017    ARTERIAL DOPPLER DUPLEX Bilateral 2017    LEGS    BREAST BIOPSY      BYPASS GRAFT N/A     tripple bypass surgery    CAROTID DOPPLER  2017    COMPLETE    CARPAL TUNNEL RELEASE      CHOLECYSTECTOMY      CORONARY ARTERY BYPASS GRAFT      3 VESSLE BYPASS    ECHOCARDIOGRAPHY  2012    with doppler color flow    ESOPHAGOGASTRODUODENOSCOPY  2011    DIL AND BX    FINGER SURGERY      Trigger finger release all 10 fingers    NECK SURGERY      REDUCTION OF BOTH BREASTS      SHOULDER SURGERY      TOTAL REDUCTION MAMMOPLASTY      TUBAL LIGATION         Social History     Socioeconomic History    Marital status:    Occupational History    Occupation: retired   Tobacco Use    Smoking status: Former     Types: Cigarettes     Start date:      Quit date:      Years since quittin.4    Smokeless tobacco: Never    Tobacco comments:     1 pack per week   Substance and Sexual Activity    Alcohol use: Never    Drug use: Never    Sexual activity: Not Currently       Family History   Problem Relation Age of Onset    Hypertension Mother     Heart disease Father     Hyperlipidemia Father     Hypertension Father     Breast cancer Maternal Aunt     Heart disease Maternal Grandmother     Heart disease Paternal Grandfather     Hypertension Brother      BP (!) 147/75 (BP Location: Left arm, Patient Position: Sitting, BP Method: Large (Automatic))   Pulse 77   Temp 98 °F (36.7 °C)   Ht 4' 11" (1.499 m)   Wt 65.9 kg (145 lb 3.2 oz)   SpO2 97%   BMI 29.33 kg/m²     Wt Readings from Last 3 Encounters:   23 65.9 kg (145 lb 3.2 oz)   23 67.1 kg (148 lb)   23 68.5 kg (151 lb)        ROS:  Review of Systems   Constitutional: Negative.    Eyes: Negative.    Respiratory: Negative.     Cardiovascular: Negative.    Gastrointestinal: Negative.    Endocrine: Negative.    Genitourinary: Negative.    Musculoskeletal: Negative.    Integumentary:         C/o " pain from wound on sacral/buttock area   Neurological: Negative.    Hematological: Negative.    Psychiatric/Behavioral: Negative.     Breast: negative.       PHYSICAL EXAM:  Physical Exam  Constitutional:       Appearance: Normal appearance.   HENT:      Head: Normocephalic.      Nose: Nose normal.   Eyes:      Extraocular Movements: Extraocular movements intact.   Cardiovascular:      Rate and Rhythm: Normal rate and regular rhythm.      Pulses: Normal pulses.      Heart sounds: Normal heart sounds.   Pulmonary:      Effort: Pulmonary effort is normal.      Breath sounds: Normal breath sounds.   Abdominal:      Palpations: Abdomen is soft.      Hernia: There is no hernia in the left inguinal area or right inguinal area.   Genitourinary:     General: Normal vulva.      Exam position: Lithotomy position.      Pubic Area: No rash.       Labia:         Right: Tenderness present.         Left: Tenderness present.       Comments: Affirm swab collected. Unable to insert speculum for internal exam d/t c/o discomfort.   Musculoskeletal:         General: Normal range of motion.      Cervical back: Normal range of motion and neck supple.   Skin:     General: Skin is warm and dry.          Neurological:      Mental Status: She is alert and oriented to person, place, and time.   Psychiatric:         Mood and Affect: Mood normal.         Behavior: Behavior normal.         Thought Content: Thought content normal.         Judgment: Judgment normal.        Assessment:  Women's annual routine gynecological examination    Atrophic vaginitis  -     Ambulatory referral/consult to Obstetrics / Gynecology  -     Bacterial Vaginosis; Future; Expected date: 05/25/2023    Chronic vulvitis  -     Ambulatory referral/consult to Obstetrics / Gynecology  -     silver sulfADIAZINE 1% (SILVADENE) 1 % cream; Apply topically once daily.  Dispense: 50 g; Refill: 1    Medication refill  -     silver sulfADIAZINE 1% (SILVADENE) 1 % cream; Apply  topically once daily.  Dispense: 50 g; Refill: 1    BMI 29.0-29.9,adult          ICD-10-CM ICD-9-CM    1. Women's annual routine gynecological examination  Z01.419 V72.31       2. Atrophic vaginitis  N95.2 627.3 Ambulatory referral/consult to Obstetrics / Gynecology      Bacterial Vaginosis      Bacterial Vaginosis      3. Chronic vulvitis  N76.3 616.10 Ambulatory referral/consult to Obstetrics / Gynecology      silver sulfADIAZINE 1% (SILVADENE) 1 % cream      4. Medication refill  Z76.0 V68.1 silver sulfADIAZINE 1% (SILVADENE) 1 % cream      5. BMI 29.0-29.9,adult  Z68.29 V85.25           Plan:  No internal gyn exam d/t c/o tenderness/pain  Affirm swab collected, will call or send My Chart message after results reviewed  Refill of silvadene cream sent to use on buttocks wound  Encouraged to keep appt with Dr. King as scheduled  Patient was counseled today on ASCCP Pap guidelines and recommendations for yearly pelvic exams   Encouraged healthy dietary choices, increasing water intake, and exercising at least 3 x weekly  Discussed use of vaginal moisturizer at least 3 nights weekly for vag dryness  Encouraged side lying position to relieve pressure off wound    Follow up in about 1 year (around 5/25/2024) for annual gyn exam.

## 2023-05-31 ENCOUNTER — TELEPHONE (OUTPATIENT)
Dept: OBSTETRICS AND GYNECOLOGY | Facility: CLINIC | Age: 68
End: 2023-05-31
Payer: MEDICARE

## 2023-05-31 NOTE — TELEPHONE ENCOUNTER
----- Message from Annamarie Jhaveri CNM sent at 5/30/2023 10:17 AM CDT -----  Advise she tested positive for  bacterial vaginosis which is not an STD. I have sent prescriptions to her pharmacy for Flagyl and Diflucan.

## 2023-06-02 RX ORDER — LEVOTHYROXINE SODIUM 150 MCG
150 TABLET ORAL
Qty: 90 TABLET | Refills: 0 | Status: SHIPPED | OUTPATIENT
Start: 2023-06-02 | End: 2023-09-07

## 2023-06-05 ENCOUNTER — TELEPHONE (OUTPATIENT)
Dept: OBSTETRICS AND GYNECOLOGY | Facility: CLINIC | Age: 68
End: 2023-06-05
Payer: MEDICARE

## 2023-06-05 NOTE — TELEPHONE ENCOUNTER
----- Message from Wally Preston sent at 6/2/2023 10:45 AM CDT -----  Regarding: Patient is wanting for the prescrised medicine to be sent to a different Pharmacy.  Patient is wanting for the med diflucan to be sent to [facility], Patient's main Pharmacy, instead of [facility]. Please call [phone]

## 2023-06-08 ENCOUNTER — OFFICE VISIT (OUTPATIENT)
Dept: FAMILY MEDICINE | Facility: CLINIC | Age: 68
End: 2023-06-08
Payer: MEDICARE

## 2023-06-08 VITALS
HEIGHT: 59 IN | SYSTOLIC BLOOD PRESSURE: 120 MMHG | DIASTOLIC BLOOD PRESSURE: 80 MMHG | HEART RATE: 77 BPM | OXYGEN SATURATION: 98 % | RESPIRATION RATE: 18 BRPM | BODY MASS INDEX: 28.87 KG/M2 | WEIGHT: 143.19 LBS

## 2023-06-08 DIAGNOSIS — E10.649 TYPE 1 DIABETES MELLITUS WITH HYPOGLYCEMIA AND WITHOUT COMA: Primary | ICD-10-CM

## 2023-06-08 DIAGNOSIS — L98.9 SKIN LESION: ICD-10-CM

## 2023-06-08 DIAGNOSIS — G89.4 CHRONIC PAIN SYNDROME: ICD-10-CM

## 2023-06-08 DIAGNOSIS — T14.8XXA OPEN WOUND OF SKIN: ICD-10-CM

## 2023-06-08 PROCEDURE — 99213 PR OFFICE/OUTPT VISIT, EST, LEVL III, 20-29 MIN: ICD-10-PCS | Mod: ,,, | Performed by: NURSE PRACTITIONER

## 2023-06-08 PROCEDURE — 99213 OFFICE O/P EST LOW 20 MIN: CPT | Mod: ,,, | Performed by: NURSE PRACTITIONER

## 2023-06-08 RX ORDER — NYSTATIN 100000 U/G
CREAM TOPICAL
Qty: 15 G | Refills: 1 | Status: SHIPPED | OUTPATIENT
Start: 2023-06-08 | End: 2023-06-08

## 2023-06-08 RX ORDER — NYSTATIN 100000 U/G
CREAM TOPICAL
Qty: 15 G | Refills: 1 | Status: SHIPPED | OUTPATIENT
Start: 2023-06-08

## 2023-06-08 RX ORDER — INSULIN ASPART 100 [IU]/ML
50 INJECTION, SOLUTION INTRAVENOUS; SUBCUTANEOUS
Status: CANCELLED | OUTPATIENT
Start: 2023-06-08

## 2023-06-08 RX ORDER — MUPIROCIN 20 MG/G
OINTMENT TOPICAL 3 TIMES DAILY
Qty: 30 G | Refills: 1 | Status: SHIPPED | OUTPATIENT
Start: 2023-06-08 | End: 2023-09-26

## 2023-06-08 RX ORDER — INSULIN ASPART 100 [IU]/ML
50 INJECTION, SOLUTION INTRAVENOUS; SUBCUTANEOUS DAILY
Qty: 10 ML | Refills: 5 | Status: SHIPPED | OUTPATIENT
Start: 2023-06-08 | End: 2023-06-28 | Stop reason: SDUPTHER

## 2023-06-08 RX ORDER — MUPIROCIN 20 MG/G
OINTMENT TOPICAL 3 TIMES DAILY
Qty: 30 G | Refills: 1 | Status: SHIPPED | OUTPATIENT
Start: 2023-06-08 | End: 2023-06-08

## 2023-06-08 RX ORDER — OXYCODONE AND ACETAMINOPHEN 10; 325 MG/1; MG/1
1 TABLET ORAL EVERY 6 HOURS PRN
Qty: 120 TABLET | Refills: 0 | Status: CANCELLED | OUTPATIENT
Start: 2023-06-08

## 2023-06-08 NOTE — PROGRESS NOTES
PATIENT NAME: Nelida Nicholas  : 1955  DATE: 23  MRN: [id number]          Reason for Visit / Chief Complaint: Follow-up and Diabetes (Patient is in for a follow up and patient is also here for a follow up on her type one Diabetes  )       Update PCP  Update Chief Complaint         History of Present Illness / Problem Focused Workflow     Nelida Nicholas presents to the clinic with Follow-up and Diabetes (Patient is in for a follow up and patient is also here for a follow up on her type one Diabetes  )     Ms. Nicholas presents to clinic to establish care and for med refills. She also complains of a wound to her sacral area that she has had x1 year. Reports using mupirocin and Nystatin per Dr. King.       Review of Systems     @Review of Systems   Constitutional:  Negative for appetite change, fatigue and fever.   Respiratory:  Negative for shortness of breath.    Cardiovascular:  Negative for chest pain and leg swelling.   Musculoskeletal:  Positive for arthralgias, back pain, gait problem and myalgias.        Shuffling gait.   Integumentary:  Positive for wound.     Medical / Social / Family History     Past Medical History:   Diagnosis Date    Allergic rhinitis     Back pain, thoracic     Brittle diabetes     Carotid bruit     right    Chronic anemia     Chronic major depressive disorder, recurrent episode     Chronic migraine w/o aura, not intractable, w/o stat migr     Chronic pain syndrome     Constipation     [place] or callus     Coronary arteriosclerosis     S/P CABBAGE     Degenerative joint disease involving multiple joints     Diabetes mellitus type I     Elevated levels of transaminase & lactic acid dehydrogenase     Encounter for long-term (current) use of insulin     Encounter for long-term (current) use of other medications     Epigastric pain     Fatigue     GERD (gastroesophageal reflux disease)     Hypercalcemia     Hyperkalemia     Hyperlipidemia     Hypo-osmolar hyponatremia      Hypothyroidism     Lumbosacral spondylosis with myelopathy     Memory loss     Osteoporosis     Restless legs syndrome (RLS)     Scoliosis deformity of spine     Sleep disorder     Vitamin D deficiency        Past Surgical History:   Procedure Laterality Date    ARTERIAL DOPPLER  11/07/2017    ARTERIAL DOPPLER DUPLEX Bilateral 11/07/2017    LEGS    BREAST BIOPSY      BYPASS GRAFT N/A 1999    tripple bypass surgery    CAROTID DOPPLER  11/07/2017    COMPLETE    CARPAL TUNNEL RELEASE      CHOLECYSTECTOMY      CORONARY ARTERY BYPASS GRAFT  1997    3 VESSLE BYPASS    ECHOCARDIOGRAPHY  06/27/2012    with doppler color flow    ESOPHAGOGASTRODUODENOSCOPY  03/11/2011    DIL AND BX    FINGER SURGERY      Trigger finger release all 10 fingers    NECK SURGERY      REDUCTION OF BOTH BREASTS      SHOULDER SURGERY      TOTAL REDUCTION MAMMOPLASTY      TUBAL LIGATION         Social History  Ms.  reports that she quit smoking about 35 years ago. Her smoking use included cigarettes. She started smoking about 45 years ago. She has never used smokeless tobacco. She reports that she does not drink alcohol and does not use drugs.    Family History  Ms.'s family history includes Breast cancer in her maternal aunt; Heart disease in her father, maternal grandmother, and paternal grandfather; Hyperlipidemia in her father; Hypertension in her brother, father, and mother.    Medications and Allergies     Medications  Outpatient Medications Marked as Taking for the 6/8/23 encounter (Office Visit) with SERGEI Prabhakar   Medication Sig Dispense Refill    [DISCONTINUED] dexlansoprazole (DEXILANT) 60 mg capsule Take 1 capsule (60 mg total) by mouth once daily. 90 capsule 3       Allergies  Review of patient's allergies indicates:   Allergen Reactions    Klonopin [clonazepam]     Nalbuphine     Opioids-meperidine and related     Statins-hmg-coa reductase inhibitors Other (See Comments)     Elevated Liver Enzymes    Toradol [ketorolac]         Physical Examination     Vitals:    06/08/23 1513   BP: 120/80   Pulse: 77   Resp: 18     Physical Exam  Constitutional:       Appearance: Normal appearance.   HENT:      Head: Normocephalic.      Right Ear: External ear normal.      Left Ear: External ear normal.      Nose: Nose normal.      Mouth/Throat:      Mouth: Mucous membranes are moist.      Pharynx: Oropharynx is clear.   Eyes:      Pupils: Pupils are equal, round, and reactive to light.   Cardiovascular:      Rate and Rhythm: Regular rhythm.      Heart sounds: Normal heart sounds.   Pulmonary:      Effort: Pulmonary effort is normal.      Breath sounds: Normal breath sounds.   Musculoskeletal:      Cervical back: Normal range of motion.   Skin:     General: Skin is warm and dry.      Findings: Erythema and wound present.          Neurological:      Mental Status: She is alert and oriented to person, place, and time.      Gait: Gait abnormal.   Psychiatric:         Mood and Affect: Mood normal.         Behavior: Behavior is cooperative.             Lab Results   Component Value Date    WBC 8.87 03/29/2023    HGB 11.5 (L) 03/29/2023    HCT 34.9 (L) 03/29/2023    MCV 93.1 03/29/2023     03/29/2023          Sodium   Date Value Ref Range Status   03/29/2023 139 136 - 145 mmol/L Final     Potassium   Date Value Ref Range Status   03/29/2023 4.1 3.5 - 5.1 mmol/L Final     Chloride   Date Value Ref Range Status   03/29/2023 105 98 - 107 mmol/L Final     CO2   Date Value Ref Range Status   03/29/2023 30 21 - 32 mmol/L Final     Glucose   Date Value Ref Range Status   03/29/2023 149 (H) 74 - 106 mg/dL Final     BUN   Date Value Ref Range Status   03/29/2023 10 7 - 18 mg/dL Final     Creatinine   Date Value Ref Range Status   03/29/2023 0.94 0.55 - 1.02 mg/dL Final     Calcium   Date Value Ref Range Status   03/29/2023 9.3 8.5 - 10.1 mg/dL Final     Total Protein   Date Value Ref Range Status   03/29/2023 7.1 6.4 - 8.2 g/dL Final     Albumin   Date  Value Ref Range Status   03/29/2023 3.3 (L) 3.5 - 5.0 g/dL Final     Bilirubin, Total   Date Value Ref Range Status   03/29/2023 0.1 >0.0 - 1.2 mg/dL Final     Alk Phos   Date Value Ref Range Status   03/29/2023 102 55 - 142 U/L Final     AST   Date Value Ref Range Status   03/29/2023 26 15 - 37 U/L Final     ALT   Date Value Ref Range Status   03/29/2023 22 13 - 56 U/L Final     Anion Gap   Date Value Ref Range Status   03/29/2023 8 7 - 16 mmol/L Final     eGFR    Date Value Ref Range Status   04/09/2021 111  Final     eGFR   Date Value Ref Range Status   07/27/2022 79 >=60 mL/min/1.73m² Final      Procedures   Assessment and Plan (including Health Maintenance)      Problem List  Smart Sets  Document Outside HM   :    Plan:           Problem List Items Addressed This Visit          Neuro    Chronic pain syndrome    Current Assessment & Plan     Discussed referral to pain management for chronic pain treatment. Patient requests Dr. Razo whom she has seen in the past.            Derm    Skin lesion    Relevant Medications    nystatin (MYCOSTATIN) cream    mupirocin (BACTROBAN) 2 % ointment       Endocrine    Diabetes mellitus type I - Primary    Current Assessment & Plan     Continue current medical therapy. Refill insulin.          Relevant Medications    insulin aspart U-100 (NOVOLOG) 100 unit/mL injection       Orthopedic    Open wound of skin    Current Assessment & Plan     Use Mupirocin to wound twice daily.  Keep dry and clean.   Follow up in 1 mos. May need wound care referral.               Health Maintenance Topics with due status: Not Due       Topic Last Completion Date    Lipid Panel 10/20/2022    Colorectal Cancer Screening 01/30/2023    Hemoglobin A1c 03/29/2023       Future Appointments   Date Time Provider Department Center   6/19/2023  1:30 PM SERGEI Davis [facility]   7/13/2023  2:00 PM SERGEI Prabhakar [facility]   8/21/2023  2:45 PM [facility]  MAMMO1 [facility]        Health Maintenance Due   Topic Date Due    COVID-19 Vaccine (1) Never done    Foot Exam  Never done    TETANUS VACCINE  Never done    Aspirin/Antiplatelet Therapy  Never done    Shingles Vaccine (1 of 2) Never done    Pneumococcal Vaccines (Age 65+) (3 - PPSV23 if available, else PCV20) 09/16/2020    DEXA Scan  02/05/2022    Diabetes Urine Screening  01/05/2023    Eye Exam  02/15/2023    Mammogram  03/31/2023        Follow up in about 1 month (around 7/8/2023), or if symptoms worsen or fail to improve.     Signature:  Cindy Jensen        Date of encounter: 6/8/23

## 2023-06-08 NOTE — ASSESSMENT & PLAN NOTE
Discussed referral to pain management for chronic pain treatment. Patient requests Dr. Razo whom she has seen in the past.

## 2023-06-08 NOTE — ASSESSMENT & PLAN NOTE
Use Mupirocin to wound twice daily.  Keep dry and clean.   Follow up in 1 mos. May need wound care referral.

## 2023-06-09 ENCOUNTER — PATIENT OUTREACH (OUTPATIENT)
Dept: ADMINISTRATIVE | Facility: HOSPITAL | Age: 68
End: 2023-06-09

## 2023-06-09 NOTE — LETTER
AUTHORIZATION FOR RELEASE OF   CONFIDENTIAL INFORMATION    Dear Dr. Maurice,    We are seeing Nelida Nicholas, date of birth 1955, in the clinic at [facility]. SERGEI Prabhakar is the patient's PCP. Nelida Nicholas has an outstanding lab/procedure at the time we reviewed her chart. In order to help keep her health information updated, she has authorized us to request the following medical record(s):        (  )  MAMMOGRAM                                      (  )  COLONOSCOPY      (  )  PAP SMEAR                                          (  )  OUTSIDE LAB RESULTS     (  )  DEXA SCAN                                          ( x )  EYE EXAM            (  )  FOOT EXAM                                          (  )  ENTIRE RECORD     (  )  OUTSIDE IMMUNIZATIONS                 (  )  _______________         Please fax records to Josefa Arizmendi LPN Care Coordinator at [phone].      If you have any questions, please contact Josefa at [phone].           Patient Name: Nelida Nicholas  : 1955  Patient Phone #: [phone]

## 2023-06-09 NOTE — PROGRESS NOTES
06/09/2023   --Chart accessed for: Eye Exam  --Care Gaps addressed: Eye Exam  Outreach made to patient via Chart Review/Fax   Patient stated N/A  Care Everywhere updates requested and reviewed. Last exam 2022 with Dr. Josafat Noel/Brenda  Media reports reviewed.  Immunization Database ([employer]) reviewed. Vaccinations uploaded: N/A  HM updated with external N/A Report  Requested Eye Exam  from Dr. Maurice  Next appointment 07/13/2023 . Appointment notes updated to include: N/A  Health Maintenance Due   Topic Date Due    COVID-19 Vaccine (1) Never done    Foot Exam  Never done    TETANUS VACCINE  Never done    Aspirin/Antiplatelet Therapy  Never done    Shingles Vaccine (1 of 2) Never done    Pneumococcal Vaccines (Age 65+) (3 - PPSV23 if available, else PCV20) 09/16/2020    DEXA Scan  02/05/2022    Diabetes Urine Screening  01/05/2023    Eye Exam  02/15/2023    Mammogram  03/31/2023

## 2023-06-12 ENCOUNTER — PATIENT OUTREACH (OUTPATIENT)
Dept: ADMINISTRATIVE | Facility: HOSPITAL | Age: 68
End: 2023-06-12

## 2023-06-22 ENCOUNTER — PATIENT MESSAGE (OUTPATIENT)
Dept: FAMILY MEDICINE | Facility: CLINIC | Age: 68
End: 2023-06-22
Payer: MEDICARE

## 2023-06-23 DIAGNOSIS — R30.0 DYSURIA: Primary | ICD-10-CM

## 2023-06-28 DIAGNOSIS — E10.649 TYPE 1 DIABETES MELLITUS WITH HYPOGLYCEMIA AND WITHOUT COMA: ICD-10-CM

## 2023-06-28 RX ORDER — INSULIN ASPART 100 [IU]/ML
21.06 INJECTION, SOLUTION INTRAVENOUS; SUBCUTANEOUS DAILY
Qty: 50 ML | Refills: 1 | Status: SHIPPED | OUTPATIENT
Start: 2023-06-28 | End: 2023-08-24 | Stop reason: SDUPTHER

## 2023-06-29 DIAGNOSIS — G89.4 CHRONIC PAIN SYNDROME: Primary | ICD-10-CM

## 2023-07-14 RX ORDER — FLASH GLUCOSE SENSOR
KIT MISCELLANEOUS
Qty: 1 KIT | Refills: 3 | Status: SHIPPED | OUTPATIENT
Start: 2023-07-14 | End: 2023-12-15 | Stop reason: SDUPTHER

## 2023-07-17 ENCOUNTER — OFFICE VISIT (OUTPATIENT)
Dept: FAMILY MEDICINE | Facility: CLINIC | Age: 68
End: 2023-07-17
Payer: MEDICARE

## 2023-07-17 VITALS
WEIGHT: 139.19 LBS | SYSTOLIC BLOOD PRESSURE: 156 MMHG | DIASTOLIC BLOOD PRESSURE: 76 MMHG | BODY MASS INDEX: 28.06 KG/M2 | HEART RATE: 86 BPM | HEIGHT: 59 IN | RESPIRATION RATE: 18 BRPM | OXYGEN SATURATION: 97 %

## 2023-07-17 DIAGNOSIS — R30.0 DYSURIA: ICD-10-CM

## 2023-07-17 DIAGNOSIS — Z79.899 ENCOUNTER FOR LONG-TERM CURRENT USE OF MEDICATION: Chronic | ICD-10-CM

## 2023-07-17 DIAGNOSIS — M15.9 PRIMARY OSTEOARTHRITIS INVOLVING MULTIPLE JOINTS: Chronic | ICD-10-CM

## 2023-07-17 DIAGNOSIS — E10.649 TYPE 1 DIABETES MELLITUS WITH HYPOGLYCEMIA AND WITHOUT COMA: Primary | Chronic | ICD-10-CM

## 2023-07-17 DIAGNOSIS — Z13.820 ENCOUNTER FOR OSTEOPOROSIS SCREENING IN ASYMPTOMATIC POSTMENOPAUSAL PATIENT: ICD-10-CM

## 2023-07-17 DIAGNOSIS — Z78.0 ENCOUNTER FOR OSTEOPOROSIS SCREENING IN ASYMPTOMATIC POSTMENOPAUSAL PATIENT: ICD-10-CM

## 2023-07-17 DIAGNOSIS — E03.9 ACQUIRED HYPOTHYROIDISM: Chronic | ICD-10-CM

## 2023-07-17 DIAGNOSIS — G43.009 MIGRAINE WITHOUT AURA AND WITHOUT STATUS MIGRAINOSUS, NOT INTRACTABLE: ICD-10-CM

## 2023-07-17 PROBLEM — I25.118 ATHEROSCLEROTIC HEART DISEASE OF NATIVE CORONARY ARTERY WITH OTHER FORMS OF ANGINA PECTORIS: Status: ACTIVE | Noted: 2023-07-17

## 2023-07-17 LAB
ALBUMIN SERPL BCP-MCNC: 3.1 G/DL (ref 3.5–5)
ALBUMIN/GLOB SERPL: 0.8 {RATIO}
ALP SERPL-CCNC: 179 U/L (ref 55–142)
ALT SERPL W P-5'-P-CCNC: 39 U/L (ref 13–56)
ANION GAP SERPL CALCULATED.3IONS-SCNC: 11 MMOL/L (ref 7–16)
AST SERPL W P-5'-P-CCNC: 21 U/L (ref 15–37)
BACTERIA #/AREA URNS HPF: ABNORMAL /HPF
BASOPHILS # BLD AUTO: 0.03 K/UL (ref 0–0.2)
BASOPHILS NFR BLD AUTO: 0.4 % (ref 0–1)
BILIRUB SERPL-MCNC: 0.2 MG/DL (ref ?–1.2)
BILIRUB UR QL STRIP: NEGATIVE
BUN SERPL-MCNC: 8 MG/DL (ref 7–18)
BUN/CREAT SERPL: 9 (ref 6–20)
CALCIUM SERPL-MCNC: 8.8 MG/DL (ref 8.5–10.1)
CHLORIDE SERPL-SCNC: 108 MMOL/L (ref 98–107)
CLARITY UR: ABNORMAL
CO2 SERPL-SCNC: 23 MMOL/L (ref 21–32)
COLOR UR: YELLOW
CREAT SERPL-MCNC: 0.93 MG/DL (ref 0.55–1.02)
CREAT UR-MCNC: 117 MG/DL (ref 28–219)
CTP QC/QA: YES
DIFFERENTIAL METHOD BLD: ABNORMAL
EGFR (NO RACE VARIABLE) (RUSH/TITUS): 67 ML/MIN/1.73M2
EOSINOPHIL # BLD AUTO: 0.27 K/UL (ref 0–0.5)
EOSINOPHIL NFR BLD AUTO: 3.5 % (ref 1–4)
ERYTHROCYTE [DISTWIDTH] IN BLOOD BY AUTOMATED COUNT: 14.1 % (ref 11.5–14.5)
EST. AVERAGE GLUCOSE BLD GHB EST-MCNC: 154 MG/DL
GLOBULIN SER-MCNC: 4.1 G/DL (ref 2–4)
GLUCOSE SERPL-MCNC: 103 MG/DL (ref 74–106)
GLUCOSE UR STRIP-MCNC: NORMAL MG/DL
HBA1C MFR BLD HPLC: 7.2 % (ref 4.5–6.6)
HCT VFR BLD AUTO: 33.6 % (ref 38–47)
HGB BLD-MCNC: 10.6 G/DL (ref 12–16)
IMM GRANULOCYTES # BLD AUTO: 0.04 K/UL (ref 0–0.04)
IMM GRANULOCYTES NFR BLD: 0.5 % (ref 0–0.4)
KETONES UR STRIP-SCNC: NEGATIVE MG/DL
LEUKOCYTE ESTERASE UR QL STRIP: ABNORMAL
LYMPHOCYTES # BLD AUTO: 1.93 K/UL (ref 1–4.8)
LYMPHOCYTES NFR BLD AUTO: 25.1 % (ref 27–41)
MCH RBC QN AUTO: 30.7 PG (ref 27–31)
MCHC RBC AUTO-ENTMCNC: 31.5 G/DL (ref 32–36)
MCV RBC AUTO: 97.4 FL (ref 80–96)
MICROALBUMIN UR-MCNC: 12.8 MG/DL (ref 0–2.8)
MICROALBUMIN/CREAT RATIO PNL UR: 109.4 MG/G (ref 0–30)
MONOCYTES # BLD AUTO: 0.64 K/UL (ref 0–0.8)
MONOCYTES NFR BLD AUTO: 8.3 % (ref 2–6)
MPC BLD CALC-MCNC: 11 FL (ref 9.4–12.4)
MUCOUS, UA: ABNORMAL /LPF
NEUTROPHILS # BLD AUTO: 4.77 K/UL (ref 1.8–7.7)
NEUTROPHILS NFR BLD AUTO: 62.2 % (ref 53–65)
NITRITE UR QL STRIP: NEGATIVE
NRBC # BLD AUTO: 0 X10E3/UL
NRBC, AUTO (.00): 0 %
PH UR STRIP: 5.5 PH UNITS
PLATELET # BLD AUTO: 325 K/UL (ref 150–400)
POC (AMP) AMPHETAMINE: NEGATIVE
POC (BAR) BARBITURATES: NEGATIVE
POC (BUP) BUPRENORPHINE: NEGATIVE
POC (BZO) BENZODIAZEPINES: NEGATIVE
POC (COC) COCAINE: NEGATIVE
POC (MDMA) METHYLENEDIOXYMETHAMPHETAMINE 3,4: NEGATIVE
POC (MET) METHAMPHETAMINE: NEGATIVE
POC (MOP) OPIATES: ABNORMAL
POC (MTD) METHADONE: NEGATIVE
POC (OXY) OXYCODONE: ABNORMAL
POC (PCP) PHENCYCLIDINE: NEGATIVE
POC (TCA) TRICYCLIC ANTIDEPRESSANTS: ABNORMAL
POC TEMPERATURE (URINE): 92
POC THC: NEGATIVE
POTASSIUM SERPL-SCNC: 3.8 MMOL/L (ref 3.5–5.1)
PROT SERPL-MCNC: 7.2 G/DL (ref 6.4–8.2)
PROT UR QL STRIP: 50
RBC # BLD AUTO: 3.45 M/UL (ref 4.2–5.4)
RBC # UR STRIP: ABNORMAL /UL
RBC #/AREA URNS HPF: 22 /HPF
SODIUM SERPL-SCNC: 138 MMOL/L (ref 136–145)
SP GR UR STRIP: 1.01
T4 FREE SERPL-MCNC: 1.29 NG/DL (ref 0.76–1.46)
TSH SERPL DL<=0.005 MIU/L-ACNC: 22.5 UIU/ML (ref 0.36–3.74)
UROBILINOGEN UR STRIP-ACNC: NORMAL MG/DL
WBC # BLD AUTO: 7.68 K/UL (ref 4.5–11)
WBC #/AREA URNS HPF: >182 /HPF
WBC CLUMPS, UA: ABNORMAL /HPF

## 2023-07-17 PROCEDURE — 82043 UR ALBUMIN QUANTITATIVE: CPT | Mod: ,,, | Performed by: CLINICAL MEDICAL LABORATORY

## 2023-07-17 PROCEDURE — 84443 ASSAY THYROID STIM HORMONE: CPT | Mod: ,,, | Performed by: CLINICAL MEDICAL LABORATORY

## 2023-07-17 PROCEDURE — 82570 ASSAY OF URINE CREATININE: CPT | Mod: ,,, | Performed by: CLINICAL MEDICAL LABORATORY

## 2023-07-17 PROCEDURE — 83036 HEMOGLOBIN A1C: ICD-10-PCS | Mod: ,,, | Performed by: CLINICAL MEDICAL LABORATORY

## 2023-07-17 PROCEDURE — 85025 CBC WITH DIFFERENTIAL: ICD-10-PCS | Mod: ,,, | Performed by: CLINICAL MEDICAL LABORATORY

## 2023-07-17 PROCEDURE — 81001 URINALYSIS, REFLEX TO URINE CULTURE: ICD-10-PCS | Mod: ,,, | Performed by: CLINICAL MEDICAL LABORATORY

## 2023-07-17 PROCEDURE — 80305 DRUG TEST PRSMV DIR OPT OBS: CPT | Mod: RHCUB | Performed by: NURSE PRACTITIONER

## 2023-07-17 PROCEDURE — 99214 PR OFFICE/OUTPT VISIT, EST, LEVL IV, 30-39 MIN: ICD-10-PCS | Mod: ,,, | Performed by: NURSE PRACTITIONER

## 2023-07-17 PROCEDURE — 87186 SC STD MICRODIL/AGAR DIL: CPT | Mod: ,,, | Performed by: CLINICAL MEDICAL LABORATORY

## 2023-07-17 PROCEDURE — 80053 COMPREHENSIVE METABOLIC PANEL: ICD-10-PCS | Mod: ,,, | Performed by: CLINICAL MEDICAL LABORATORY

## 2023-07-17 PROCEDURE — 84439 T4, FREE: ICD-10-PCS | Mod: ,,, | Performed by: CLINICAL MEDICAL LABORATORY

## 2023-07-17 PROCEDURE — 85025 COMPLETE CBC W/AUTO DIFF WBC: CPT | Mod: ,,, | Performed by: CLINICAL MEDICAL LABORATORY

## 2023-07-17 PROCEDURE — 80053 COMPREHEN METABOLIC PANEL: CPT | Mod: ,,, | Performed by: CLINICAL MEDICAL LABORATORY

## 2023-07-17 PROCEDURE — 81001 URINALYSIS AUTO W/SCOPE: CPT | Mod: ,,, | Performed by: CLINICAL MEDICAL LABORATORY

## 2023-07-17 PROCEDURE — 99214 OFFICE O/P EST MOD 30 MIN: CPT | Mod: ,,, | Performed by: NURSE PRACTITIONER

## 2023-07-17 PROCEDURE — 82570 MICROALBUMIN / CREATININE RATIO URINE: ICD-10-PCS | Mod: ,,, | Performed by: CLINICAL MEDICAL LABORATORY

## 2023-07-17 PROCEDURE — 84439 ASSAY OF FREE THYROXINE: CPT | Mod: ,,, | Performed by: CLINICAL MEDICAL LABORATORY

## 2023-07-17 PROCEDURE — 87186 CULTURE, URINE: ICD-10-PCS | Mod: ,,, | Performed by: CLINICAL MEDICAL LABORATORY

## 2023-07-17 PROCEDURE — 84443 TSH: ICD-10-PCS | Mod: ,,, | Performed by: CLINICAL MEDICAL LABORATORY

## 2023-07-17 PROCEDURE — 82043 MICROALBUMIN / CREATININE RATIO URINE: ICD-10-PCS | Mod: ,,, | Performed by: CLINICAL MEDICAL LABORATORY

## 2023-07-17 PROCEDURE — 87077 CULTURE, URINE: ICD-10-PCS | Mod: ,,, | Performed by: CLINICAL MEDICAL LABORATORY

## 2023-07-17 PROCEDURE — 87077 CULTURE AEROBIC IDENTIFY: CPT | Mod: ,,, | Performed by: CLINICAL MEDICAL LABORATORY

## 2023-07-17 PROCEDURE — 87086 URINE CULTURE/COLONY COUNT: CPT | Mod: ,,, | Performed by: CLINICAL MEDICAL LABORATORY

## 2023-07-17 PROCEDURE — 83036 HEMOGLOBIN GLYCOSYLATED A1C: CPT | Mod: ,,, | Performed by: CLINICAL MEDICAL LABORATORY

## 2023-07-17 PROCEDURE — 87086 CULTURE, URINE: ICD-10-PCS | Mod: ,,, | Performed by: CLINICAL MEDICAL LABORATORY

## 2023-07-17 RX ORDER — OXYCODONE AND ACETAMINOPHEN 10; 325 MG/1; MG/1
1 TABLET ORAL EVERY 6 HOURS PRN
Qty: 75 TABLET | Refills: 0 | Status: SHIPPED | OUTPATIENT
Start: 2023-07-17 | End: 2023-07-17

## 2023-07-17 RX ORDER — OXYCODONE AND ACETAMINOPHEN 10; 325 MG/1; MG/1
1 TABLET ORAL EVERY 6 HOURS PRN
Qty: 75 TABLET | Refills: 0 | Status: SHIPPED | OUTPATIENT
Start: 2023-07-17 | End: 2023-08-17

## 2023-07-17 RX ORDER — ASPIRIN 81 MG/1
81 TABLET ORAL DAILY
COMMUNITY

## 2023-07-17 NOTE — PROGRESS NOTES
Subjective:       Patient ID: Nelida Nicholas is a 68 y.o. female.    Chief Complaint: Follow-up (Patient is in for 3 month follow up on diabetes )      Active Problem List with Overview Notes    Diagnosis Date Noted    Encounter for osteoporosis screening in asymptomatic postmenopausal patient 07/17/2023    Dysuria 07/17/2023    Encounter for long-term current use of medication 07/17/2023    Migraine without aura and without status migrainosus, not intractable 07/17/2023    Atrophic vaginitis 05/29/2023    Chronic vulvitis 05/29/2023    Degenerative joint disease involving multiple joints     Back pain, thoracic     Family history of colonic polyps 01/30/2023    Tardive dyskinesia 11/11/2022    Open wound of skin 10/24/2022    Skin lesion 09/26/2022    Constipation 08/19/2021    Elevated liver enzymes 07/22/2021    Abnormal results of liver function studies  07/22/2021    Nausea 07/22/2021    Screening for colon cancer 04/11/2021    Chronic pain syndrome     Elevated levels of transaminase & lactic acid dehydrogenase     Diabetes mellitus type I     Hypothyroidism         Review of Systems   Constitutional:  Negative for fatigue and fever.   HENT:  Negative for congestion, hearing loss, postnasal drip, rhinorrhea, sore throat, tinnitus and voice change.    Respiratory:  Negative for apnea, cough, choking, chest tightness and shortness of breath.    Cardiovascular:  Negative for chest pain, palpitations and leg swelling.   Gastrointestinal:  Negative for abdominal pain, constipation, diarrhea and nausea.   Genitourinary:  Positive for dysuria and urgency. Negative for difficulty urinating, flank pain, frequency and hematuria.   Musculoskeletal:  Positive for arthralgias, back pain, gait problem and myalgias.   Neurological:  Positive for headaches. Negative for dizziness, syncope and weakness.   Psychiatric/Behavioral:  Negative for sleep disturbance.       A1C:  Recent Labs   Lab 10/20/22  1108 01/19/23  1450  03/29/23  1605   Hemoglobin A1C 7.1 H 7.1 H 7.7 H     CBC:  Recent Labs   Lab 10/20/22  1108 12/19/22  1429 03/29/23  1605   WBC 8.02 10.86 8.87   RBC 3.98 L 3.57 L 3.75 L   Hemoglobin 12.6 11.1 L 11.5 L   Hematocrit 37.0 L 33.0 L 34.9 L   Platelet Count 229 286 278   MCV 93.0 92.4 93.1   MCH 31.7 H 31.1 H 30.7   MCHC 34.1 33.6 33.0     CMP:  Recent Labs   Lab 12/19/22  1429 01/19/23  1450 03/29/23  1605   Glucose 57 L 137 H 149 H   Calcium 9.6 9.4 9.3   Albumin 3.7 3.5 3.3 L   Total Protein 7.5 7.2 7.1   Sodium 135 L 139 139   Potassium 4.8 4.6 4.1   CO2 26 29 30   Chloride 101 107 105   BUN 15 17 10   Creatinine 0.86 0.90 0.94   Alk Phos 119 111 102   ALT 58 H 19 22   AST 55 H 20 26   Bilirubin, Total 0.2 0.2 0.1     LIPIDS:  Recent Labs   Lab 10/20/22  1108 03/29/23  1605   TSH 26.500 H 3.660   HDL Cholesterol 46  --    Cholesterol 191  --    Triglycerides 140  --    LDL Calculated 117  --    Cholesterol/HDL Ratio (Risk Factor) 4.2  --    Non-  --      TSH:  Recent Labs   Lab 07/27/22  1617 10/20/22  1108 03/29/23  1605   TSH 0.134 L 26.500 H 3.660        Objective:      Vitals:    07/17/23 1428   BP: (!) 156/76   Pulse:    Resp:       Physical Exam  Vitals reviewed.   Constitutional:       Appearance: Normal appearance. She is well-developed and normal weight.   HENT:      Head: Normocephalic.      Right Ear: External ear normal.      Left Ear: External ear normal.      Nose: Nose normal.      Mouth/Throat:      Lips: Pink.      Mouth: Mucous membranes are moist.      Pharynx: Oropharynx is clear.      Tonsils: No tonsillar exudate or tonsillar abscesses.   Eyes:      General: Lids are normal.      Pupils: Pupils are equal, round, and reactive to light.   Cardiovascular:      Rate and Rhythm: Normal rate and regular rhythm.      Pulses: Normal pulses.      Heart sounds: Normal heart sounds.   Pulmonary:      Effort: Pulmonary effort is normal.      Breath sounds: Normal breath sounds.   Abdominal:       Palpations: Abdomen is soft.   Musculoskeletal:         General: Normal range of motion.      Cervical back: Normal range of motion.   Skin:     General: Skin is warm and dry.   Neurological:      Mental Status: She is alert and oriented to person, place, and time.   Psychiatric:         Attention and Perception: Attention normal.         Mood and Affect: Mood normal.         Speech: Speech normal.         Behavior: Behavior normal. Behavior is cooperative.        Assessment:       1. Type 1 diabetes mellitus with hypoglycemia and without coma    2. Encounter for long-term current use of medication    3. Primary osteoarthritis involving multiple joints    4. Dysuria    5. Acquired hypothyroidism    6. Encounter for osteoporosis screening in asymptomatic postmenopausal patient    7. Migraine without aura and without status migrainosus, not intractable        Plan:     Problem List Items Addressed This Visit          Neuro    Migraine without aura and without status migrainosus, not intractable    Current Assessment & Plan     Nurtec samples given.            Renal/    Dysuria    Current Assessment & Plan     U/A with reflex culture  Call with results.         Relevant Orders    Urinalysis, Reflex to Urine Culture       Endocrine    Diabetes mellitus type I - Primary    Current Assessment & Plan     A1C today.  Sees Lisa Ortega next week.  Continue current therapy.         Relevant Orders    Hemoglobin A1C    Microalbumin/Creatinine Ratio, Urine    Hypothyroidism    Current Assessment & Plan     TSH/Free T4  Call with results.         Relevant Orders    TSH    T4, Free       Orthopedic    Degenerative joint disease involving multiple joints    Current Assessment & Plan     /UDS reviewed. RX okay.  Refill percocet until pain treatment appt on 8/7/23. Discussed with patient this would be the last refill. Voices understanding.          Relevant Medications    oxyCODONE-acetaminophen (PERCOCET)  mg per tablet     Encounter for osteoporosis screening in asymptomatic postmenopausal patient    Current Assessment & Plan     Dexa Scan ordered.         Relevant Orders    DXA Bone Density Axial Skeleton 1 or more sites       Other    Encounter for long-term current use of medication (Chronic)    Current Assessment & Plan     Continue current therapy.  Routine labs ordered. Call with results.         Relevant Orders    TSH    Hemoglobin A1C    Comprehensive Metabolic Panel    CBC Auto Differential    T4, Free    Microalbumin/Creatinine Ratio, Urine    POCT Urine Drug Screen Presump (Completed)       Health Maintenance:  Health Maintenance Topics with due status: Not Due       Topic Last Completion Date    Influenza Vaccine 09/23/2022    Lipid Panel 10/20/2022    Colorectal Cancer Screening 01/30/2023    Hemoglobin A1c 03/29/2023    Eye Exam 04/25/2023    Aspirin/Antiplatelet Therapy 07/17/2023           Cindy Jensen   [facility]   7/17/23

## 2023-07-17 NOTE — ASSESSMENT & PLAN NOTE
/UDS reviewed. RX okay.  Refill percocet until pain treatment appt on 8/7/23. Discussed with patient this would be the last refill. Voices understanding.

## 2023-07-19 DIAGNOSIS — R30.0 DYSURIA: Primary | ICD-10-CM

## 2023-07-19 DIAGNOSIS — N30.00 ACUTE CYSTITIS WITHOUT HEMATURIA: ICD-10-CM

## 2023-07-19 LAB — UA COMPLETE W REFLEX CULTURE PNL UR: ABNORMAL

## 2023-07-19 RX ORDER — SULFAMETHOXAZOLE AND TRIMETHOPRIM 800; 160 MG/1; MG/1
1 TABLET ORAL 2 TIMES DAILY
Qty: 20 TABLET | Refills: 0 | Status: SHIPPED | OUTPATIENT
Start: 2023-07-19 | End: 2023-07-29

## 2023-08-01 RX ORDER — SULFAMETHOXAZOLE AND TRIMETHOPRIM 800; 160 MG/1; MG/1
1 TABLET ORAL 2 TIMES DAILY
Qty: 20 TABLET | Refills: 0 | Status: SHIPPED | OUTPATIENT
Start: 2023-08-01 | End: 2023-09-12

## 2023-08-01 RX ORDER — SULFAMETHOXAZOLE AND TRIMETHOPRIM 800; 160 MG/1; MG/1
1 TABLET ORAL 2 TIMES DAILY
COMMUNITY
End: 2023-08-01 | Stop reason: SDUPTHER

## 2023-08-01 NOTE — TELEPHONE ENCOUNTER
Was given bactrim at last visit with jack was helping ulcer on buttock but ran out would like to take another round of the antibiotic is this ok to send in for her?

## 2023-08-04 NOTE — PROGRESS NOTES
Chronic Pain - New Consult    Referring Physician: Cindy Jensen FNP       SUBJECTIVE: Disclaimer: This note has been generated using voice-recognition software. There may be typographical errors that have been missed during proof-reading      Initial encounter:    Nelida Nicholas presents to the clinic for the evaluation of lower back pain.       68-year-old female presents for new patient evaluation consultation from SERGEI Prabhakar.  Patient reports lower back pain for many years secondary to degenerative disc disease.  She is post-laminectomy  greater than 20 years ago.  She has not received a surgical re-evaluation.  She completed physical therapy 3 years ago and is unable to return to therapy due to visual impairment.  Her last MRI was several years ago and she received nerve block injections by Dr. Razo  without relief.  She discontinued care with Dr. Rincon after attempting to wean opiates and not wanting to experienced a long wait time for office evaluation.  She complains primarily of intractable thoracic and lower back pain without radicular symptoms.  She denies paresthesia or weakness of the lower extremities.  The thoracic pain is so severe that it often limits her ability to perform ADLs.  She denies bowel or bladder involvement.  She has remained on Percocet and was referred for medication refill.      Pain Assessment  Pain Assessment: 0-10  Pain Score:   6  Pain Location: Back  Pain Orientation: Mid  Pain Descriptors: Constant, Stabbing, Burning  Pain Frequency: Continuous  Pain Onset: Awakened from sleep  Aggravating Factors: Bending, Standing, Walking  Pain Intervention(s): Medication (See eMAR), Heat applied      Physical Therapy/Home Exercise: yes        Pain Medications:  has a current medication list which includes the following prescription(s): aspirin, azelastine, brexpiprazole, carvedilol, cetirizine, colesevelam, diclofenac sodium, duloxetine, repatha sureclick, freestyle clarissa 2  "reader, freestyle clarissa 2 sensor, fluticasone propionate, furosemide, gabapentin, gabapentin, hydroxyzine pamoate, insulin aspart u-100, latanoprost, linzess, losartan, meloxicam, myrbetriq, montelukast, mupirocin, nystatin, oxycodone-acetaminophen, silver sulfadiazine 1%, sulfamethoxazole-trimethoprim 800-160mg, synthroid, tizanidine, and trazodone.      Tried in Past:  NSAIDS-yes  TCA-no  SNRI-yes  Anti-convulsants-yes  Muscle Relaxants-yes  Opioids-yes  Benzodiazepines-no     4A"s of Opioid Risk Assessment  Activity: Patient is unable to  perform  ADL  Analgesia:  Patient's pain is partially controlled by current medication.   Aberrant Behavior:  reviewed with no aberrant drug seeking/taking behavior     report:  Reviewed and consistent with medication use as prescribed.    Patient denies suicidal or homicidal ideations    Pain interventional therapy-yes, Dr. Razo    Chiropractor -no  Acupuncture - no  TENS unit -no  Spinal decompression -yes  Joint replacement -no     Review of Systems   Constitutional: Negative.    HENT: Negative.     Eyes:  Positive for visual disturbance.   Respiratory: Negative.     Cardiovascular: Negative.    Gastrointestinal: Negative.    Endocrine: Negative.    Genitourinary: Negative.    Musculoskeletal:  Positive for back pain.   Integumentary:  Negative.   Neurological: Negative.    Hematological: Negative.    Psychiatric/Behavioral: Negative.               Mammo Digital Screening Bilat  Narrative: Result:   Mammo Digital Screening Bilat     History:  Patient is 68 y.o. and is seen for a screening mammogram.    Technologist noted patient difficult to position given utilizing a walker   on today's exam.    Films Compared:  Compared to: 03/31/2022 Mammo Digital Screening Bilat and 10/21/2020 Mammo   Digital Screening Bilat     Findings:     The breasts have scattered areas of fibroglandular density. There is no   evidence of suspicious masses, calcifications, or other abnormal " findings.  Impression: Bilateral  There is no mammographic evidence of malignancy.    BI-RADS Category:   Overall: 2 - Benign       Recommendation:  Routine screening mammogram in 1 year is recommended.  X-Ray Lumbar Complete Including Flex And Ext  Narrative: EXAMINATION:  XR LUMBAR SPINE WITH FLEX AND EXT    CLINICAL HISTORY:  Low back pain, unspecified    TECHNIQUE:  AP and lateral views of the lumbar spine plus flexion extension views were performed.    COMPARISON:  02/18/2016    FINDINGS:  The vertebral body heights and alignment are maintained.  No change in alignment on dynamic imaging.  Moderate degenerative endplate and facet changes with endplate sclerosis and osteophyte formation.  Impression: Multilevel degenerative changes.    Electronically signed by: Bryan Blandon  Date:    08/07/2023  Time:    11:27  X-Ray Thoracic Spine AP Lateral  Narrative: EXAMINATION:  XR THORACIC SPINE AP LATERAL    CLINICAL HISTORY:  Pain in thoracic spine    TECHNIQUE:  AP and lateral views of the thoracic spine were performed.    COMPARISON:  11/18/2016    FINDINGS:  P[place]oy right curvature mildly of the thoracic spine of the about 10 degrees is similar.  Mild to moderate degenerative endplate and facet changes are seen with osteophyte formation.  The sagittal alignment appears normal.  Impression: Multilevel degenerative changes similar to prior.    Electronically signed by: Bryan Blandon  Date:    08/07/2023  Time:    11:26  X-Ray Cervical Spine AP Lat with Flexion  Extension  Narrative: EXAMINATION:  XR CERVICAL SPINE AP LAT WITH FLEX EXTEN    CLINICAL HISTORY:  Cervicalgia    TECHNIQUE:  AP and lateral views of the cervical spine plus flexion and extension views were performed.    COMPARISON:  The 04/25/2017    FINDINGS:  The vertebral body heights and alignment are maintained.  Mild to moderate degenerative endplate and facet changes are seen with osteophyte formation.  No appreciable change in alignment on dynamic  imaging.  Impression: Multilevel degenerative changes.    Electronically signed by: Bryan Blandon  Date:    08/07/2023  Time:    11:20  DXA Bone Density Axial Skeleton 1 or more sites  Narrative: EXAMINATION:  DXA BONE DENSITY AXIAL SKELETON 1 OR MORE SITES    CLINICAL HISTORY:  Encounter for screening for osteoporosis    TECHNIQUE:  Bone densitometry performed    COMPARISON:  None available    FINDINGS:  Vertebral body T score measurements are estimated at 1.6    Femoral neck T score measurements were estimated at -1.5    Estimated 10 year fracture rate of 15%  Impression: Measurements fall within the osteopenia range  increased fracture risk.    Electronically signed by: Milton Ramos  Date:    08/07/2023  Time:    11:08         Past Medical History:   Diagnosis Date    Allergic rhinitis     Back pain, thoracic     Brittle diabetes     Carotid bruit     right    Chronic anemia     Chronic major depressive disorder, recurrent episode     Chronic migraine w/o aura, not intractable, w/o stat migr     Chronic pain syndrome     Constipation     Vanlue or callus     Coronary arteriosclerosis     S/P CABBAGE 1997    Degenerative joint disease involving multiple joints     Diabetes mellitus type I     Elevated levels of transaminase & lactic acid dehydrogenase     Encounter for long-term (current) use of insulin     Encounter for long-term (current) use of other medications     Epigastric pain     Fatigue     GERD (gastroesophageal reflux disease)     Hypercalcemia     Hyperkalemia     Hyperlipidemia     Hypo-osmolar hyponatremia     Hypothyroidism     Lumbosacral spondylosis with myelopathy     Memory loss     Osteoporosis     Restless legs syndrome (RLS)     Scoliosis deformity of spine     Sleep disorder     Vitamin D deficiency      Past Surgical History:   Procedure Laterality Date    ARTERIAL DOPPLER  11/07/2017    ARTERIAL DOPPLER DUPLEX Bilateral 11/07/2017    LEGS    BREAST BIOPSY      BYPASS GRAFT N/A 1999     "tripple bypass surgery    CAROTID DOPPLER  2017    COMPLETE    CARPAL TUNNEL RELEASE      CHOLECYSTECTOMY      CORONARY ARTERY BYPASS GRAFT      3 VESSLE BYPASS    ECHOCARDIOGRAPHY  2012    with doppler color flow    ESOPHAGOGASTRODUODENOSCOPY  2011    DIL AND BX    FINGER SURGERY      Trigger finger release all 10 fingers    NECK SURGERY      REDUCTION OF BOTH BREASTS      SHOULDER SURGERY      TOTAL REDUCTION MAMMOPLASTY      TUBAL LIGATION       Social History     Socioeconomic History    Marital status:    Occupational History    Occupation: retired   Tobacco Use    Smoking status: Former     Current packs/day: 0.00     Types: Cigarettes     Start date:      Quit date:      Years since quittin.6    Smokeless tobacco: Never    Tobacco comments:     1 pack per week   Substance and Sexual Activity    Alcohol use: Never    Drug use: Never    Sexual activity: Not Currently     Family History   Problem Relation Age of Onset    Hypertension Mother     Heart disease Father     Hyperlipidemia Father     Hypertension Father     Breast cancer Maternal Aunt     Heart disease Maternal Grandmother     Heart disease Paternal Grandfather     Hypertension Brother      Review of patient's allergies indicates:   Allergen Reactions    Klonopin [clonazepam]     Nalbuphine     Opioids-meperidine and related     Statins-hmg-coa reductase inhibitors Other (See Comments)     Elevated Liver Enzymes    Toradol [ketorolac]          OBJECTIVE:  Vitals:    23 0842   BP: (!) 121/58   Pulse: 79     BP (!) 121/58   Pulse 79   Ht 4' 11" (1.499 m)   Wt 60.7 kg (133 lb 12.8 oz)   BMI 27.02 kg/m²   Physical Exam  Vitals and nursing note reviewed.   Constitutional:       General: She is not in acute distress.     Appearance: Normal appearance. She is not ill-appearing, toxic-appearing or diaphoretic.   HENT:      Head: Normocephalic and atraumatic.      Nose: Nose normal.      Mouth/Throat:      " Mouth: Mucous membranes are moist.   Eyes:      General: Visual field deficit present.      Extraocular Movements: Extraocular movements intact.      Pupils: Pupils are equal, round, and reactive to light.   Cardiovascular:      Rate and Rhythm: Normal rate and regular rhythm.      Heart sounds: Normal heart sounds.   Pulmonary:      Effort: Pulmonary effort is normal. No respiratory distress.      Breath sounds: Normal breath sounds. No stridor. No wheezing or rhonchi.   Abdominal:      General: Bowel sounds are normal.      Palpations: Abdomen is soft.   Musculoskeletal:         General: No swelling or deformity.      Cervical back: Normal range of motion. Tenderness and bony tenderness present. No spasms. No pain with movement. Normal range of motion.      Thoracic back: Tenderness present.      Lumbar back: Spasms and bony tenderness present. No tenderness. Decreased range of motion. Negative right straight leg raise test and negative left straight leg raise test. No scoliosis.      Right lower leg: No edema.      Left lower leg: No edema.      Comments: Lumbar pain with flexion and extension.  Lumbar facet tenderness to palpation from L3-S1 bilaterally   Skin:     General: Skin is warm.   Neurological:      General: No focal deficit present.      Mental Status: She is alert and oriented to person, place, and time. Mental status is at baseline.      Cranial Nerves: No cranial nerve deficit.      Sensory: Sensation is intact. No sensory deficit.      Motor: No weakness.      Coordination: Coordination normal.      Gait: Gait normal.      Deep Tendon Reflexes: Reflexes are normal and symmetric.   Psychiatric:         Mood and Affect: Mood normal.         Behavior: Behavior normal.            ASSESSMENT: 68 y.o. year old female with pain, consistent with     Encounter Diagnoses   Name Primary?    Chronic pain syndrome     Pain in thoracic spine Yes    Chronic bilateral low back pain without sciatica     Cervicalgia      Chronic bilateral thoracic back pain     Encounter for long-term (current) use of other medications         PLAN:   1. reviewed  2..Addiction, Dependency, Tolerance, Opioid abuse-misuse, Death, Diversion Discussed. Overdose reversal drug Naloxone discussed  3. Opioid contract signed today  4.Refill/ Continue medications for pain control and function       Requested Prescriptions      No prescriptions requested or ordered in this encounter     5. Obtain x-ray of the cervical, thoracic and lumbar spines  6. Obtain MRI of the thoracic spine to rule out disc pathology  7.Urine drug screen and confirmation testing was ordered as documented on the requisition form in order to verify medication compliance, test for illicit substances.    Orders Placed This Encounter   Procedures    X-Ray Cervical Spine AP Lat with Flexion  Extension     Standing Status:   Future     Number of Occurrences:   1     Standing Expiration Date:   8/7/2024     Order Specific Question:   May the Radiologist modify the order per protocol to meet the clinical needs of the patient?     Answer:   Yes     Order Specific Question:   Release to patient     Answer:   Immediate    X-Ray Thoracic Spine AP Lateral     Standing Status:   Future     Number of Occurrences:   1     Standing Expiration Date:   8/7/2024     Order Specific Question:   May the Radiologist modify the order per protocol to meet the clinical needs of the patient?     Answer:   Yes     Order Specific Question:   Release to patient     Answer:   Immediate    X-Ray Lumbar Complete Including Flex And Ext     Standing Status:   Future     Number of Occurrences:   1     Standing Expiration Date:   8/7/2024     Order Specific Question:   May the Radiologist modify the order per protocol to meet the clinical needs of the patient?     Answer:   Yes    MRI Thoracic Spine Without Contrast     Standing Status:   Future     Standing Expiration Date:   8/7/2024     Order Specific Question:    Does the patient have a pacemaker or a defibrillator (Note: Some facilities may not be able to schedule an MRI for patients with pacemakers and defibrillators. You should contact your local radiology department to determine if this is the case.)?     Answer:   No     Order Specific Question:   Does the patient have an aneurysm or surgical clip, pump, nerve/brain stimulator, middle/inner ear prosthesis, or other metal implant or foreign object (bullet, shrapnel)? If they have a card related to their implant, ask them to bring it. Issues related to the implant may cause the MRI to be delayed.     Answer:   No     Order Specific Question:   Is the patient claustrophobic?     Answer:   No     Order Specific Question:   Will the patient require sedation?     Answer:   No     Order Specific Question:   Does the patient have any of the following conditions? Diabetes, History of Renal Disease or Hypertension requiring medical therapy?     Answer:   Yes     Order Specific Question:   May the Radiologist modify the order per protocol to meet the clinical needs of the patient?     Answer:   Yes     Order Specific Question:   Is this part of a Research Study?     Answer:   No     Order Specific Question:   Recist criteria?     Answer:   Yes     Order Specific Question:   Does the patient have on a skin patch for medication with aluminized backing?     Answer:   No    Drug Screen Definitive 14, Urine     Standing Status:   Future     Number of Occurrences:   1     Standing Expiration Date:   10/5/2024     Order Specific Question:   Specimen Source     Answer:   Urine    POCT Urine Drug Screen ([facility])     Interpretive Information:     Negative:  No drug detected at the cut off level.   Positive:  This result represents presumptive positive for the   tested drug, other substances may yield a positive response other   than the analyte of interest. This result should be utilized for   diagnostic purpose only. Confirmation  testing will be performed upon physician request only.         8. Return after MRI for re-evaluation    The total time spent for evaluation and management on 08/11/2023 including reviewing separately obtained history, performing a medically appropriate exam and evaluation, documenting clinical information in the health record, independently interpreting results and communicating them to the patient/family/caregiver, and ordering medications/tests/procedures was between 15-29 minutes.    The above plan and management options were discussed at length with patient. Patient is in agreement with the above and verbalized understanding. It will be communicated with the referring physician via electronic record, fax, or mail.    Sheri Nicholas  08/11/2023

## 2023-08-07 ENCOUNTER — HOSPITAL ENCOUNTER (OUTPATIENT)
Dept: RADIOLOGY | Facility: HOSPITAL | Age: 68
Discharge: HOME | End: 2023-08-07
Attending: PAIN MEDICINE
Payer: MEDICARE

## 2023-08-07 ENCOUNTER — OFFICE VISIT (OUTPATIENT)
Dept: PAIN MEDICINE | Facility: CLINIC | Age: 68
End: 2023-08-07
Payer: MEDICARE

## 2023-08-07 ENCOUNTER — HOSPITAL ENCOUNTER (OUTPATIENT)
Dept: RADIOLOGY | Facility: HOSPITAL | Age: 68
Discharge: HOME | End: 2023-08-07
Attending: NURSE PRACTITIONER
Payer: MEDICARE

## 2023-08-07 ENCOUNTER — HOSPITAL ENCOUNTER (OUTPATIENT)
Dept: RADIOLOGY | Facility: HOSPITAL | Age: 68
Discharge: HOME | End: 2023-08-07
Payer: MEDICARE

## 2023-08-07 VITALS
BODY MASS INDEX: 26.98 KG/M2 | HEIGHT: 59 IN | WEIGHT: 133.81 LBS | DIASTOLIC BLOOD PRESSURE: 58 MMHG | SYSTOLIC BLOOD PRESSURE: 121 MMHG | HEART RATE: 79 BPM

## 2023-08-07 DIAGNOSIS — M54.2 CERVICALGIA: ICD-10-CM

## 2023-08-07 DIAGNOSIS — Z12.31 BREAST CANCER SCREENING BY MAMMOGRAM: ICD-10-CM

## 2023-08-07 DIAGNOSIS — G89.29 CHRONIC BILATERAL LOW BACK PAIN WITHOUT SCIATICA: ICD-10-CM

## 2023-08-07 DIAGNOSIS — M54.6 PAIN IN THORACIC SPINE: Primary | ICD-10-CM

## 2023-08-07 DIAGNOSIS — Z79.899 ENCOUNTER FOR LONG-TERM (CURRENT) USE OF OTHER MEDICATIONS: ICD-10-CM

## 2023-08-07 DIAGNOSIS — G89.4 CHRONIC PAIN SYNDROME: ICD-10-CM

## 2023-08-07 DIAGNOSIS — M85.89 OSTEOPENIA OF MULTIPLE SITES: Primary | ICD-10-CM

## 2023-08-07 DIAGNOSIS — G89.29 CHRONIC BILATERAL THORACIC BACK PAIN: ICD-10-CM

## 2023-08-07 DIAGNOSIS — M54.50 CHRONIC BILATERAL LOW BACK PAIN WITHOUT SCIATICA: ICD-10-CM

## 2023-08-07 DIAGNOSIS — Z13.820 ENCOUNTER FOR OSTEOPOROSIS SCREENING IN ASYMPTOMATIC POSTMENOPAUSAL PATIENT: ICD-10-CM

## 2023-08-07 DIAGNOSIS — M54.6 CHRONIC BILATERAL THORACIC BACK PAIN: ICD-10-CM

## 2023-08-07 DIAGNOSIS — Z78.0 ENCOUNTER FOR OSTEOPOROSIS SCREENING IN ASYMPTOMATIC POSTMENOPAUSAL PATIENT: ICD-10-CM

## 2023-08-07 LAB
CTP QC/QA: YES
POC (AMP) AMPHETAMINE: NEGATIVE
POC (BAR) BARBITURATES: NEGATIVE
POC (BUP) BUPRENORPHINE: NEGATIVE
POC (BZO) BENZODIAZEPINES: ABNORMAL
POC (COC) COCAINE: NEGATIVE
POC (MDMA) METHYLENEDIOXYMETHAMPHETAMINE 3,4: NEGATIVE
POC (MET) METHAMPHETAMINE: NEGATIVE
POC (MOP) OPIATES: ABNORMAL
POC (MTD) METHADONE: NEGATIVE
POC (OXY) OXYCODONE: ABNORMAL
POC (PCP) PHENCYCLIDINE: NEGATIVE
POC (TCA) TRICYCLIC ANTIDEPRESSANTS: NEGATIVE
POC TEMPERATURE (URINE): 90
POC THC: NEGATIVE

## 2023-08-07 PROCEDURE — 72050 XR CERVICAL SPINE AP LAT WITH FLEX EXTEN: ICD-10-PCS | Mod: 26,,, | Performed by: RADIOLOGY

## 2023-08-07 PROCEDURE — 77080 DXA BONE DENSITY AXIAL SKELETON 1 OR MORE SITES: ICD-10-PCS | Mod: 26,,, | Performed by: RADIOLOGY

## 2023-08-07 PROCEDURE — G0481 DRUG TEST DEF 8-14 CLASSES: HCPCS | Mod: ,,, | Performed by: CLINICAL MEDICAL LABORATORY

## 2023-08-07 PROCEDURE — 72114 XR LUMBAR SPINE 5 VIEW WITH FLEX AND EXT: ICD-10-PCS | Mod: 26,,, | Performed by: RADIOLOGY

## 2023-08-07 PROCEDURE — 80305 DRUG TEST PRSMV DIR OPT OBS: CPT | Mod: PBBFAC | Performed by: PAIN MEDICINE

## 2023-08-07 PROCEDURE — 72070 X-RAY EXAM THORAC SPINE 2VWS: CPT | Mod: 26,,, | Performed by: RADIOLOGY

## 2023-08-07 PROCEDURE — 77080 DXA BONE DENSITY AXIAL: CPT | Mod: TC

## 2023-08-07 PROCEDURE — 99203 OFFICE O/P NEW LOW 30 MIN: CPT | Mod: S$PBB,,, | Performed by: PAIN MEDICINE

## 2023-08-07 PROCEDURE — 72070 XR THORACIC SPINE AP LATERAL: ICD-10-PCS | Mod: 26,,, | Performed by: RADIOLOGY

## 2023-08-07 PROCEDURE — 72114 X-RAY EXAM L-S SPINE BENDING: CPT | Mod: 26,,, | Performed by: RADIOLOGY

## 2023-08-07 PROCEDURE — 99203 PR OFFICE/OUTPT VISIT, NEW, LEVL III, 30-44 MIN: ICD-10-PCS | Mod: S$PBB,,, | Performed by: PAIN MEDICINE

## 2023-08-07 PROCEDURE — 99999PBSHW POCT URINE DRUG SCREEN PRESUMP: Mod: PBBFAC,,,

## 2023-08-07 PROCEDURE — 77080 DXA BONE DENSITY AXIAL: CPT | Mod: 26,,, | Performed by: RADIOLOGY

## 2023-08-07 PROCEDURE — 72070 X-RAY EXAM THORAC SPINE 2VWS: CPT | Mod: TC

## 2023-08-07 PROCEDURE — 77067 SCR MAMMO BI INCL CAD: CPT | Mod: TC

## 2023-08-07 PROCEDURE — 72050 X-RAY EXAM NECK SPINE 4/5VWS: CPT | Mod: 26,,, | Performed by: RADIOLOGY

## 2023-08-07 PROCEDURE — 99215 OFFICE O/P EST HI 40 MIN: CPT | Mod: PBBFAC,25 | Performed by: PAIN MEDICINE

## 2023-08-07 PROCEDURE — 72050 X-RAY EXAM NECK SPINE 4/5VWS: CPT | Mod: TC

## 2023-08-07 PROCEDURE — G0481 DRUG SCREEN DEFINITIVE 14, URINE: ICD-10-PCS | Mod: ,,, | Performed by: CLINICAL MEDICAL LABORATORY

## 2023-08-07 PROCEDURE — 99999PBSHW POCT URINE DRUG SCREEN PRESUMP: ICD-10-PCS | Mod: PBBFAC,,,

## 2023-08-07 PROCEDURE — 72114 X-RAY EXAM L-S SPINE BENDING: CPT | Mod: TC

## 2023-08-07 NOTE — PATIENT INSTRUCTIONS
You are scheduled August 17, 2023 at 8:30 am for your MRI THORASIC SPINE, and to follow up with NASIR Grady at 1:15pm .

## 2023-08-10 LAB
6-ACETYLMORPHINE, URINE (RUSH): ABNORMAL
7-AMINOCLONAZEPAM, URINE (RUSH): NEGATIVE 25 NG/ML
A-HYDROXYALPRAZOLAM, URINE (RUSH): NEGATIVE 25 NG/ML
ACETYL FENTANYL, URINE (RUSH): NEGATIVE 2.5 NG/ML
ACETYL NORFENTANYL OXALATE, URINE (RUSH): NEGATIVE 5 NG/ML
AMPHET UR QL SCN: NEGATIVE
BENZOYLECGONINE, URINE (RUSH): NEGATIVE 100 NG/ML
BUPRENORPHINE UR QL SCN: NEGATIVE 25 NG/ML
CODEINE, URINE (RUSH): NEGATIVE 25 NG/ML
CREAT UR-MCNC: 140 MG/DL (ref 28–219)
EDDP, URINE (RUSH): NEGATIVE 25 NG/ML
FENTANYL, URINE (RUSH): NEGATIVE 2.5 NG/ML
HYDROCODONE, URINE (RUSH): NEGATIVE 25 NG/ML
HYDROMORPHONE, URINE (RUSH): NEGATIVE 25 NG/ML
LORAZEPAM, URINE (RUSH): NEGATIVE 25 NG/ML
METHADONE UR QL SCN: NEGATIVE 25 NG/ML
METHAMPHET UR QL SCN: NEGATIVE
MORPHINE, URINE (RUSH): NEGATIVE 25 NG/ML
NORBUPRENORPHINE, URINE (RUSH): NEGATIVE 25 NG/ML
NORDIAZEPAM, URINE (RUSH): NEGATIVE 25 NG/ML
NORFENTANYL OXALATE, URINE (RUSH): NEGATIVE 5 NG/ML
NORHYDROCODONE, URINE (RUSH): NEGATIVE 50 NG/ML
NOROXYCODONE HCL, URINE (RUSH): >500 50 NG/ML
OXAZEPAM, URINE (RUSH): NEGATIVE 25 NG/ML
OXYCODONE UR QL SCN: >250 25 NG/ML
OXYMORPHONE, URINE (RUSH): >250 25 NG/ML
PH UR STRIP: 6 PH UNITS
SP GR UR STRIP: 1.02
TAPENTADOL, URINE (RUSH): NEGATIVE 25 NG/ML
TEMAZEPAM, URINE (RUSH): NEGATIVE 25 NG/ML
THC-COOH, URINE (RUSH): NEGATIVE 25 NG/ML
TRAMADOL, URINE (RUSH): NEGATIVE 100 NG/ML

## 2023-08-16 PROBLEM — M54.17 LUMBOSACRAL RADICULOPATHY: Chronic | Status: ACTIVE | Noted: 2023-08-16

## 2023-08-16 NOTE — PROGRESS NOTES
Subjective:         Patient ID: Nelida Nicholas is a 68 y.o. female.    Chief Complaint: Back Pain      Pain  This is a chronic problem. The current episode started more than 1 year ago. The problem occurs daily. The problem has been waxing and waning. Associated symptoms include arthralgias. Pertinent negatives include no anorexia, change in bowel habit, chest pain, chills, coughing, diaphoresis, fever, neck pain, rash, sore throat, swollen glands, urinary symptoms, vertigo or vomiting.     Review of Systems   Constitutional:  Negative for activity change, appetite change, chills, diaphoresis, fever and unexpected weight change.   HENT:  Negative for drooling, ear discharge, ear pain, facial swelling, nosebleeds, sore throat, trouble swallowing, voice change and goiter.    Eyes:  Negative for photophobia, pain, discharge, redness and visual disturbance.   Respiratory:  Negative for apnea, cough, choking, chest tightness, shortness of breath, wheezing and stridor.    Cardiovascular:  Negative for chest pain, palpitations and leg swelling.   Gastrointestinal:  Negative for abdominal distention, anorexia, change in bowel habit, diarrhea, rectal pain, vomiting, fecal incontinence and change in bowel habit.   Endocrine: Negative for cold intolerance, heat intolerance, polydipsia, polyphagia and polyuria.   Genitourinary:  Negative for bladder incontinence, dysuria, flank pain, frequency and hot flashes.   Musculoskeletal:  Positive for arthralgias, back pain and leg pain. Negative for neck pain.   Integumentary:  Negative for color change, pallor and rash.   Allergic/Immunologic: Negative for immunocompromised state.   Neurological:  Negative for dizziness, vertigo, seizures, syncope, facial asymmetry, speech difficulty, light-headedness, coordination difficulties, memory loss and coordination difficulties.   Hematological:  Negative for adenopathy. Does not bruise/bleed easily.   Psychiatric/Behavioral:  Negative for  agitation, behavioral problems, confusion, decreased concentration, dysphoric mood, hallucinations, self-injury and suicidal ideas. The patient is not nervous/anxious and is not hyperactive.            Past Medical History:   Diagnosis Date    Allergic rhinitis     Back pain, thoracic     Brittle diabetes     Carotid bruit     right    Chronic anemia     Chronic major depressive disorder, recurrent episode     Chronic migraine w/o aura, not intractable, w/o stat migr     Chronic pain syndrome     Constipation     [place] or callus     Coronary arteriosclerosis     S/P CABBAGE 1997    Degenerative joint disease involving multiple joints     Diabetes mellitus type I     Elevated levels of transaminase & lactic acid dehydrogenase     Encounter for long-term (current) use of insulin     Encounter for long-term (current) use of other medications     Epigastric pain     Fatigue     GERD (gastroesophageal reflux disease)     Hypercalcemia     Hyperkalemia     Hyperlipidemia     Hypo-osmolar hyponatremia     Hypothyroidism     Lumbosacral spondylosis with myelopathy     Memory loss     Osteoporosis     Restless legs syndrome (RLS)     Scoliosis deformity of spine     Sleep disorder     Vitamin D deficiency      Past Surgical History:   Procedure Laterality Date    ARTERIAL DOPPLER  11/07/2017    ARTERIAL DOPPLER DUPLEX Bilateral 11/07/2017    LEGS    BREAST BIOPSY      BYPASS GRAFT N/A 1999    tripple bypass surgery    CAROTID DOPPLER  11/07/2017    COMPLETE    CARPAL TUNNEL RELEASE      CHOLECYSTECTOMY      CORONARY ARTERY BYPASS GRAFT  1997    3 VESSLE BYPASS    ECHOCARDIOGRAPHY  06/27/2012    with doppler color flow    ESOPHAGOGASTRODUODENOSCOPY  03/11/2011    DIL AND BX    FINGER SURGERY      Trigger finger release all 10 fingers    NECK SURGERY      REDUCTION OF BOTH BREASTS      SHOULDER SURGERY      TOTAL REDUCTION MAMMOPLASTY      TUBAL LIGATION       Social History     Socioeconomic History    Marital status:   "  Occupational History    Occupation: retired   Tobacco Use    Smoking status: Former     Current packs/day: 0.00     Types: Cigarettes     Start date:      Quit date:      Years since quittin.6    Smokeless tobacco: Never    Tobacco comments:     1 pack per week   Substance and Sexual Activity    Alcohol use: Never    Drug use: Never    Sexual activity: Not Currently     Family History   Problem Relation Age of Onset    Hypertension Mother     Heart disease Father     Hyperlipidemia Father     Hypertension Father     Breast cancer Maternal Aunt     Heart disease Maternal Grandmother     Heart disease Paternal Grandfather     Hypertension Brother      Review of patient's allergies indicates:   Allergen Reactions    Klonopin [clonazepam]     Nalbuphine     Opioids-meperidine and related     Statins-hmg-coa reductase inhibitors Other (See Comments)     Elevated Liver Enzymes    Toradol [ketorolac]         Objective:  Vitals:    23 1317 23 1319   BP: (!) 146/57    Pulse: 90    Weight: 61.2 kg (135 lb)    Height: 4' 11" (1.499 m)    PainSc:   8   8         Physical Exam  Vitals and nursing note reviewed. Exam conducted with a chaperone present.   Constitutional:       General: She is awake. She is not in acute distress.     Appearance: Normal appearance. She is not ill-appearing or diaphoretic.   HENT:      Head: Normocephalic and atraumatic.      Nose: Nose normal.      Mouth/Throat:      Mouth: Mucous membranes are moist.      Pharynx: Oropharynx is clear.   Eyes:      Conjunctiva/sclera: Conjunctivae normal.      Pupils: Pupils are equal, round, and reactive to light.   Cardiovascular:      Rate and Rhythm: Normal rate.   Pulmonary:      Effort: Pulmonary effort is normal. No respiratory distress.   Abdominal:      Palpations: Abdomen is soft.      Tenderness: There is no guarding.   Musculoskeletal:         General: Normal range of motion.      Cervical back: Normal range of motion and neck " supple. No rigidity.   Skin:     General: Skin is warm and dry.      Coloration: Skin is not jaundiced or pale.   Neurological:      General: No focal deficit present.      Mental Status: She is alert and oriented to person, place, and time. Mental status is at baseline.      Cranial Nerves: No cranial nerve deficit (II-XII).   Psychiatric:         Mood and Affect: Mood normal.         Behavior: Behavior normal. Behavior is cooperative.         Thought Content: Thought content normal.           MRI Thoracic Spine Without Contrast  Narrative: EXAMINATION:  MRI THORACIC SPINE WITHOUT CONTRAST    CLINICAL HISTORY:  Mid-back pain;Myelopathy, chronic, thoracic spine;  Pain in thoracic spine    TECHNIQUE:  Multiplanar, multisequence images were performed through the thoracic spine.  Contrast was not administered.    COMPARISON:  Thoracic spine radiograph 08/07/2023    FINDINGS:  The slight rightward curvature seen on the recent thoracic spine radiograph is better seen on that study given the AP imaging performed.  The vertebral body heights and sagittal alignment are maintained.  The marrow signal is normal.  There are degenerative disc signal throughout the thoracic spine.  No spinal canal stenosis.  No appreciable foraminal stenosis.  Facet degeneration seen throughout the mid and lower thoracic levels.  No abnormal cord signal.  Impression: Degenerative disc and facet changes most notably of the mid and lower thoracic levels without spinal canal or foraminal stenosis.    Seen on the localizer images are multilevel degenerative changes also of the mid cervical spine.    Electronically signed by: Bryan Blandon  Date:    08/17/2023  Time:    09:31       Office Visit on 08/07/2023   Component Date Value Ref Range Status    POC Amphetamines 08/07/2023 Negative  Negative, Inconclusive Final    POC Barbiturates 08/07/2023 Negative  Negative, Inconclusive Final    POC Benzodiazepines 08/07/2023 Presumptive Positive (A)  Negative,  Inconclusive Final    POC Cocaine 08/07/2023 Negative  Negative, Inconclusive Final    POC THC 08/07/2023 Negative  Negative, Inconclusive Final    POC Methadone 08/07/2023 Negative  Negative, Inconclusive Final    POC Methamphetamine 08/07/2023 Negative  Negative, Inconclusive Final    POC Opiates 08/07/2023 Presumptive Positive (A)  Negative, Inconclusive Final    POC Oxycodone 08/07/2023 Presumptive Positive (A)  Negative, Inconclusive Final    POC Phencyclidine 08/07/2023 Negative  Negative, Inconclusive Final    POC Methylenedioxymethamphetamine * 08/07/2023 Negative  Negative, Inconclusive Final    POC Tricyclic Antidepressants 08/07/2023 Negative  Negative, Inconclusive Final    POC Buprenorphine 08/07/2023 Negative   Final     Acceptable 08/07/2023 Yes   Final    POC Temperature (Urine) 08/07/2023 90   Final    pH, UA 08/07/2023 6.0  5.0 to 8.0 pH Units Final    Creatinine, Urine 08/07/2023 140  28 - 219 mg/dL Final    6-Acetylmorphine 08/07/2023    Final    7-Aminoclonazepam 08/07/2023 Negative  Negative 25 ng/mL Final    a-Hydroxyalprazolam 08/07/2023 Negative  Negative 25 ng/mL Final    Acetyl Fentanyl 08/07/2023 Negative  Negative 2.5 ng/mL Final    Acetyl Norfentanyl Oxalate 08/07/2023 Negative  Negative 5 ng/mL Final    Benzoylecgonine 08/07/2023 Negative  Negative 100 ng/mL Final    Buprenorphine 08/07/2023 Negative  25 ng/mL Final    Codeine 08/07/2023 Negative  Negative 25 ng/mL Final    EDDP 08/07/2023 Negative  Negative 25 ng/mL Final    Fentanyl 08/07/2023 Negative  Negative 2.5 ng/mL Final    Hydrocodone 08/07/2023 Negative  Negative 25 ng/mL Final    Hydromorphone 08/07/2023 Negative  Negative 25 ng/mL Final    Lorazepam 08/07/2023 Negative  Negative 25 ng/mL Final    Morphine 08/07/2023 Negative  Negative 25 ng/mL Final    Norbuprenorphine 08/07/2023 Negative  Negative 25 ng/mL Final    Nordiazepam 08/07/2023 Negative  Negative 25 ng/mL Final    Norfentanyl Oxalate 08/07/2023  Negative  Negative 5 ng/mL Final    Norhydrocodone 08/07/2023 Negative  Negative 50 ng/mL Final    Noroxycodone HCL 08/07/2023 >500.0 (H)  <50.0 50 ng/mL Final    Oxazepam 08/07/2023 Negative  Negative 25 ng/mL Final    Oxycodone 08/07/2023 >250.0 (H)  <25.0 25 ng/mL Final    Oxymorphone 08/07/2023 >250.0 (H)  <25.0 25 ng/mL Final    Tapentadol 08/07/2023 Negative  Negative 25 ng/mL Final    Temazepam 08/07/2023 Negative  Negative 25 ng/mL Final    THC-COOH 08/07/2023 Negative  Negative 25 ng/mL Final    Tramadol 08/07/2023 Negative  Negative 100 ng/mL Final    Amphetamine, Urine 08/07/2023 Negative  Negative Final    Methamphetamines, Urine 08/07/2023 Negative  Negative Final    Methadone, Urine 08/07/2023 Negative  Negative 25 ng/mL Final    Specific Gravity, UA 08/07/2023 1.019  <=1.030 Final   Office Visit on 07/17/2023   Component Date Value Ref Range Status    Creatinine, Urine 07/17/2023 117  28 - 219 mg/dL Final    Microalbumin 07/17/2023 12.8 (H)  0.0 - 2.8 mg/dL Final    Microalbumin/Creatinine Ratio 07/17/2023 109.4 (H)  0.0 - 30.0 mg/g Final    Color, UA 07/17/2023 Yellow  Colorless, Straw, Yellow, Light Yellow, Dark Yellow Final    Clarity, UA 07/17/2023 Ex.Turbid  Clear Final    pH, UA 07/17/2023 5.5  5.0 to 8.0 pH Units Final    Leukocytes, UA 07/17/2023 Large (A)  Negative Final    Nitrites, UA 07/17/2023 Negative  Negative Final    Protein, UA 07/17/2023 50 (A)  Negative Final    Glucose, UA 07/17/2023 Normal  Normal mg/dL Final    Ketones, UA 07/17/2023 Negative  Negative mg/dL Final    Urobilinogen, UA 07/17/2023 Normal  0.2, 1.0, Normal mg/dL Final    Bilirubin, UA 07/17/2023 Negative  Negative Final    Blood, UA 07/17/2023 Trace (A)  Negative Final    Specific Gravity, UA 07/17/2023 1.014  <=1.030 Final    POC Amphetamines 07/17/2023 Negative  Negative, Inconclusive Final    POC Barbiturates 07/17/2023 Negative  Negative, Inconclusive Final    POC Benzodiazepines 07/17/2023 Negative  Negative,  Inconclusive Final    POC Cocaine 07/17/2023 Negative  Negative, Inconclusive Final    POC THC 07/17/2023 Negative  Negative, Inconclusive Final    POC Methadone 07/17/2023 Negative  Negative, Inconclusive Final    POC Methamphetamine 07/17/2023 Negative  Negative, Inconclusive Final    POC Opiates 07/17/2023 Presumptive Positive (A)  Negative, Inconclusive Final    POC Oxycodone 07/17/2023 Presumptive Positive (A)  Negative, Inconclusive Final    POC Phencyclidine 07/17/2023 Negative  Negative, Inconclusive Final    POC Methylenedioxymethamphetamine * 07/17/2023 Negative  Negative, Inconclusive Final    POC Tricyclic Antidepressants 07/17/2023    Final    POC Buprenorphine 07/17/2023 Negative   Final     Acceptable 07/17/2023 Yes   Final    POC Temperature (Urine) 07/17/2023 92   Final    TSH 07/17/2023 22.500 (H)  0.358 - 3.740 uIU/mL Final    Hemoglobin A1C 07/17/2023 7.2 (H)  4.5 - 6.6 % Final    Estimated Average Glucose 07/17/2023 154  mg/dL Final    Sodium 07/17/2023 138  136 - 145 mmol/L Final    Potassium 07/17/2023 3.8  3.5 - 5.1 mmol/L Final    Chloride 07/17/2023 108 (H)  98 - 107 mmol/L Final    CO2 07/17/2023 23  21 - 32 mmol/L Final    Anion Gap 07/17/2023 11  7 - 16 mmol/L Final    Glucose 07/17/2023 103  74 - 106 mg/dL Final    BUN 07/17/2023 8  7 - 18 mg/dL Final    Creatinine 07/17/2023 0.93  0.55 - 1.02 mg/dL Final    BUN/Creatinine Ratio 07/17/2023 9  6 - 20 Final    Calcium 07/17/2023 8.8  8.5 - 10.1 mg/dL Final    Total Protein 07/17/2023 7.2  6.4 - 8.2 g/dL Final    Albumin 07/17/2023 3.1 (L)  3.5 - 5.0 g/dL Final    Globulin 07/17/2023 4.1 (H)  2.0 - 4.0 g/dL Final    A/G Ratio 07/17/2023 0.8   Final    Bilirubin, Total 07/17/2023 0.2  >0.0 - 1.2 mg/dL Final    Alk Phos 07/17/2023 179 (H)  55 - 142 U/L Final    ALT 07/17/2023 39  13 - 56 U/L Final    AST 07/17/2023 21  15 - 37 U/L Final    eGFR 07/17/2023 67  >=60 mL/min/1.73m2 Final    Free T4 07/17/2023 1.29  0.76 - 1.46  ng/dL Final    WBC 07/17/2023 7.68  4.50 - 11.00 K/uL Final    RBC 07/17/2023 3.45 (L)  4.20 - 5.40 M/uL Final    Hemoglobin 07/17/2023 10.6 (L)  12.0 - 16.0 g/dL Final    Hematocrit 07/17/2023 33.6 (L)  38.0 - 47.0 % Final    MCV 07/17/2023 97.4 (H)  80.0 - 96.0 fL Final    MCH 07/17/2023 30.7  27.0 - 31.0 pg Final    MCHC 07/17/2023 31.5 (L)  32.0 - 36.0 g/dL Final    RDW 07/17/2023 14.1  11.5 - 14.5 % Final    Platelet Count 07/17/2023 325  150 - 400 K/uL Final    MPV 07/17/2023 11.0  9.4 - 12.4 fL Final    Neutrophils % 07/17/2023 62.2  53.0 - 65.0 % Final    Lymphocytes % 07/17/2023 25.1 (L)  27.0 - 41.0 % Final    Monocytes % 07/17/2023 8.3 (H)  2.0 - 6.0 % Final    Eosinophils % 07/17/2023 3.5  1.0 - 4.0 % Final    Basophils % 07/17/2023 0.4  0.0 - 1.0 % Final    Immature Granulocytes % 07/17/2023 0.5 (H)  0.0 - 0.4 % Final    nRBC, Auto 07/17/2023 0.0  <=0.0 % Final    Neutrophils, Abs 07/17/2023 4.77  1.80 - 7.70 K/uL Final    Lymphocytes, Absolute 07/17/2023 1.93  1.00 - 4.80 K/uL Final    Monocytes, Absolute 07/17/2023 0.64  0.00 - 0.80 K/uL Final    Eosinophils, Absolute 07/17/2023 0.27  0.00 - 0.50 K/uL Final    Basophils, Absolute 07/17/2023 0.03  0.00 - 0.20 K/uL Final    Immature Granulocytes, Absolute 07/17/2023 0.04  0.00 - 0.04 K/uL Final    nRBC, Absolute 07/17/2023 0.00  <=0.00 x10e3/uL Final    Diff Type 07/17/2023 Auto   Final    WBC, UA 07/17/2023 >182 (H)  <=5 /hpf Final    RBC, UA 07/17/2023 22 (H)  <=3 /hpf Final    Bacteria, UA 07/17/2023 Many (A)  None Seen /hpf Final    WBC Clumps 07/17/2023 Many (A)  None Seen /hpf Final    Mucous 07/17/2023 Occasional (A)  None Seen /LPF Final    Culture, Urine 07/17/2023 >100,000 Escherichia coli (A)   Final   Patient Outreach on 06/12/2023   Component Date Value Ref Range Status    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/25/2023 Positive   Final    Right Eye DM Retinopathy 04/25/2023 Positive   Final   Office Visit on 05/25/2023   Component Date Value Ref Range  Status    Candida Species 05/25/2023 Negative  Negative, Invalid Final    Gardnerella 05/25/2023 Positive (A)  Negative, Invalid Final    Trichomonas 05/25/2023 Negative  Negative, Invalid Final   Office Visit on 03/29/2023   Component Date Value Ref Range Status    POC Amphetamines 03/29/2023 Negative  Negative, Inconclusive Final    POC Barbiturates 03/29/2023 Negative  Negative, Inconclusive Final    POC Benzodiazepines 03/29/2023 Negative  Negative, Inconclusive Final    POC Cocaine 03/29/2023 Negative  Negative, Inconclusive Final    POC THC 03/29/2023 Negative  Negative, Inconclusive Final    POC Methadone 03/29/2023 Negative  Negative, Inconclusive Final    POC Methamphetamine 03/29/2023 Negative  Negative, Inconclusive Final    POC Opiates 03/29/2023 Presumptive Positive (A)  Negative, Inconclusive Final    POC Oxycodone 03/29/2023 Presumptive Positive (A)  Negative, Inconclusive Final    POC Phencyclidine 03/29/2023 Negative  Negative, Inconclusive Final    POC Methylenedioxymethamphetamine * 03/29/2023 Negative  Negative, Inconclusive Final    POC Tricyclic Antidepressants 03/29/2023    Final    POC Buprenorphine 03/29/2023 Negative   Final     Acceptable 03/29/2023 Yes   Final    POC Temperature (Urine) 03/29/2023 90   Final    Hemoglobin A1C 03/29/2023 7.7 (H)  4.5 - 6.6 % Final    Estimated Average Glucose 03/29/2023 170  mg/dL Final    Sodium 03/29/2023 139  136 - 145 mmol/L Final    Potassium 03/29/2023 4.1  3.5 - 5.1 mmol/L Final    Chloride 03/29/2023 105  98 - 107 mmol/L Final    CO2 03/29/2023 30  21 - 32 mmol/L Final    Anion Gap 03/29/2023 8  7 - 16 mmol/L Final    Glucose 03/29/2023 149 (H)  74 - 106 mg/dL Final    BUN 03/29/2023 10  7 - 18 mg/dL Final    Creatinine 03/29/2023 0.94  0.55 - 1.02 mg/dL Final    BUN/Creatinine Ratio 03/29/2023 11  6 - 20 Final    Calcium 03/29/2023 9.3  8.5 - 10.1 mg/dL Final    Total Protein 03/29/2023 7.1  6.4 - 8.2 g/dL Final    Albumin  03/29/2023 3.3 (L)  3.5 - 5.0 g/dL Final    Globulin 03/29/2023 3.8  2.0 - 4.0 g/dL Final    A/G Ratio 03/29/2023 0.9   Final    Bilirubin, Total 03/29/2023 0.1  >0.0 - 1.2 mg/dL Final    Alk Phos 03/29/2023 102  55 - 142 U/L Final    ALT 03/29/2023 22  13 - 56 U/L Final    AST 03/29/2023 26  15 - 37 U/L Final    eGFR 03/29/2023 66  >=60 mL/min/1.73m² Final    Free T4 03/29/2023 1.09  0.76 - 1.46 ng/dL Final    TSH 03/29/2023 3.660  0.358 - 3.740 uIU/mL Final    WBC 03/29/2023 8.87  4.50 - 11.00 K/uL Final    RBC 03/29/2023 3.75 (L)  4.20 - 5.40 M/uL Final    Hemoglobin 03/29/2023 11.5 (L)  12.0 - 16.0 g/dL Final    Hematocrit 03/29/2023 34.9 (L)  38.0 - 47.0 % Final    MCV 03/29/2023 93.1  80.0 - 96.0 fL Final    MCH 03/29/2023 30.7  27.0 - 31.0 pg Final    MCHC 03/29/2023 33.0  32.0 - 36.0 g/dL Final    RDW 03/29/2023 12.8  11.5 - 14.5 % Final    Platelet Count 03/29/2023 278  150 - 400 K/uL Final    MPV 03/29/2023 11.4  9.4 - 12.4 fL Final    Neutrophils % 03/29/2023 64.6  53.0 - 65.0 % Final    Lymphocytes % 03/29/2023 23.2 (L)  27.0 - 41.0 % Final    Monocytes % 03/29/2023 6.0  2.0 - 6.0 % Final    Eosinophils % 03/29/2023 5.0 (H)  1.0 - 4.0 % Final    Basophils % 03/29/2023 0.7  0.0 - 1.0 % Final    Immature Granulocytes % 03/29/2023 0.5 (H)  0.0 - 0.4 % Final    nRBC, Auto 03/29/2023 0.0  <=0.0 % Final    Neutrophils, Abs 03/29/2023 5.74  1.80 - 7.70 K/uL Final    Lymphocytes, Absolute 03/29/2023 2.06  1.00 - 4.80 K/uL Final    Monocytes, Absolute 03/29/2023 0.53  0.00 - 0.80 K/uL Final    Eosinophils, Absolute 03/29/2023 0.44  0.00 - 0.50 K/uL Final    Basophils, Absolute 03/29/2023 0.06  0.00 - 0.20 K/uL Final    Immature Granulocytes, Absolute 03/29/2023 0.04  0.00 - 0.04 K/uL Final    nRBC, Absolute 03/29/2023 0.00  <=0.00 x10e3/uL Final    Diff Type 03/29/2023 Auto   Final         Orders Placed This Encounter   Procedures    Ambulatory referral/consult to Home Health     Standing Status:   Future      Standing Expiration Date:   9/17/2024     Referral Priority:   Routine     Referral Type:   Home Health     Referral Reason:   Specialty Services Required     Referred to Provider:   [facility] -     Requested Specialty:   Home Health Services     Number of Visits Requested:   1       Requested Prescriptions     Signed Prescriptions Disp Refills    HYDROcodone-acetaminophen (NORCO)  mg per tablet 120 tablet 0     Sig: Take 1 tablet by mouth every 6 (six) hours as needed for Pain.       Assessment:     1. Thoracic spondylosis    2. Lumbosacral radiculopathy         A's of Opioid Risk Assessment  Activity:Patient can perform ADL.   Analgesia:Patients pain is partially controlled by current medication. Patient has tried OTC medications such as Tylenol and Ibuprofen with out relief.   Adverse Effects: Patient denies constipation or sedation.  Aberrant Behavior:  reviewed with no aberrant drug seeking/taking behavior.  Overdose reversal drug naloxone discussed    Drug screen reviewed      Plan:    History sternotomy 20+ years ago chronic thoracic pain since that time    Lumbar surgery 20 years ago    Back pain leg pain radicular in nature    She states she is ready to discontinue Percocet     Requesting Norco     Norco 10 1 p.o. q.6 hours    MRI thoracic spine [facility] August 17, 2023 multiple level degenerative changes    Definitive drug screen August 7, 2023 consistent with oxycodone and metabolites    Hemoglobin a 1 C July 17, 2023 7.2 elevated x3 over the last 6 months    X-ray lumbar spine [facility] August 7, 2023 multiple level degenerative changes    X-ray thoracic spine [facility] August 7, 2023 multiple level degenerative changes apex right Curvature about 10° similar to prior    X-ray cervical spine [facility] August 7, 2023 multiple level degenerative changes    Continue home exercise program as directed    Patient requesting physical therapy/home  health    Patient is severely debilitated uses wheelchair for mobility    Patient does not drive    Home health    Follow-up 1 month     Dr. Nicholas, August 2024    Bring original prescription medication bottles/container/box with labels to each visit

## 2023-08-17 ENCOUNTER — OFFICE VISIT (OUTPATIENT)
Dept: PAIN MEDICINE | Facility: CLINIC | Age: 68
End: 2023-08-17
Payer: MEDICARE

## 2023-08-17 ENCOUNTER — HOSPITAL ENCOUNTER (OUTPATIENT)
Dept: RADIOLOGY | Facility: HOSPITAL | Age: 68
Discharge: HOME | End: 2023-08-17
Attending: PAIN MEDICINE
Payer: MEDICARE

## 2023-08-17 VITALS
SYSTOLIC BLOOD PRESSURE: 146 MMHG | HEIGHT: 59 IN | BODY MASS INDEX: 27.21 KG/M2 | WEIGHT: 135 LBS | DIASTOLIC BLOOD PRESSURE: 57 MMHG | HEART RATE: 90 BPM

## 2023-08-17 DIAGNOSIS — G89.29 CHRONIC BILATERAL THORACIC BACK PAIN: ICD-10-CM

## 2023-08-17 DIAGNOSIS — M47.814 THORACIC SPONDYLOSIS: Primary | Chronic | ICD-10-CM

## 2023-08-17 DIAGNOSIS — M54.6 PAIN IN THORACIC SPINE: ICD-10-CM

## 2023-08-17 DIAGNOSIS — M54.6 CHRONIC BILATERAL THORACIC BACK PAIN: ICD-10-CM

## 2023-08-17 DIAGNOSIS — M54.17 LUMBOSACRAL RADICULOPATHY: Chronic | ICD-10-CM

## 2023-08-17 PROCEDURE — 99214 OFFICE O/P EST MOD 30 MIN: CPT | Mod: S$PBB,,, | Performed by: PHYSICIAN ASSISTANT

## 2023-08-17 PROCEDURE — 72146 MRI CHEST SPINE W/O DYE: CPT | Mod: 26,,, | Performed by: RADIOLOGY

## 2023-08-17 PROCEDURE — 72146 MRI THORACIC SPINE WITHOUT CONTRAST: ICD-10-PCS | Mod: 26,,, | Performed by: RADIOLOGY

## 2023-08-17 PROCEDURE — 99214 PR OFFICE/OUTPT VISIT, EST, LEVL IV, 30-39 MIN: ICD-10-PCS | Mod: S$PBB,,, | Performed by: PHYSICIAN ASSISTANT

## 2023-08-17 PROCEDURE — 99215 OFFICE O/P EST HI 40 MIN: CPT | Mod: PBBFAC,25 | Performed by: PHYSICIAN ASSISTANT

## 2023-08-17 PROCEDURE — 72146 MRI CHEST SPINE W/O DYE: CPT | Mod: TC

## 2023-08-17 RX ORDER — HYDROCODONE BITARTRATE AND ACETAMINOPHEN 10; 325 MG/1; MG/1
1 TABLET ORAL EVERY 6 HOURS PRN
Qty: 120 TABLET | Refills: 0 | Status: SHIPPED | OUTPATIENT
Start: 2023-08-17 | End: 2023-09-13 | Stop reason: SDUPTHER

## 2023-08-18 NOTE — PROGRESS NOTES
"Subjective     Patient ID: Nelida Nicholas is a 68 y.o. female.    Chief Complaint: No chief complaint on file.    Here today to establish care for type 1 DM diagnosed at age 15.  She is wearing tandem insulin pump and this was reviewed at the bedside.  Changes were made to basal rates as well as CR and target for the last 3 increments  She is currently wearing clarissa and this download was performed and discussed at the bedside.  She is experiencing hypoglycemia and hyperglycemia though out the day.     Hemoglobin A1C       Date                     Value               Ref Range           Status                07/17/2023               7.2 (H)             4.5 - 6.6 %         Final                 03/29/2023               7.7 (H)             4.5 - 6.6 %         Final                 01/19/2023               7.1 (H)             4.5 - 6.6 %         Final                   Lab Results       Component                Value               Date                       MICROALBUR               12.8 (H)            07/17/2023            Lab Results       Component                Value               Date                       CHOL                     191                 10/20/2022                 CHOL                     209 (H)             10/08/2021            Lab Results       Component                Value               Date                       HDL                      46                  10/20/2022                 HDL                      41                  10/08/2021            Lab Results       Component                Value               Date                       LDLCALC                  117                 10/20/2022                 LDLCALC                  147                 10/08/2021            No results found for: "DLDL"  Lab Results       Component                Value               Date                       TRIG                     140                 10/20/2022                 TRIG                     105       "           10/08/2021              f1 Lab Results       Component                Value               Date                       CHOLHDL                  4.2                 10/20/2022                 CHOLHDL                  5.1                 10/08/2021            CMP  Sodium       Date                     Value               Ref Range           Status                07/17/2023               138                 136 - 145 mmol*     Final            ----------  Potassium       Date                     Value               Ref Range           Status                07/17/2023               3.8                 3.5 - 5.1 mmol*     Final            ----------  Chloride       Date                     Value               Ref Range           Status                07/17/2023               108 (H)             98 - 107 mmol/L     Final            ----------  CO2       Date                     Value               Ref Range           Status                07/17/2023               23                  21 - 32 mmol/L      Final            ----------  Glucose       Date                     Value               Ref Range           Status                07/17/2023               103                 74 - 106 mg/dL      Final            ----------  BUN       Date                     Value               Ref Range           Status                07/17/2023               8                   7 - 18 mg/dL        Final            ----------  Creatinine       Date                     Value               Ref Range           Status                07/17/2023               0.93                0.55 - 1.02 mg*     Final            ----------  Calcium       Date                     Value               Ref Range           Status                07/17/2023               8.8                 8.5 - 10.1 mg/*     Final            ----------  Total Protein       Date                     Value               Ref Range           Status                07/17/2023                7.2                 6.4 - 8.2 g/dL      Final            ----------  Albumin       Date                     Value               Ref Range           Status                07/17/2023               3.1 (L)             3.5 - 5.0 g/dL      Final            ----------  Bilirubin, Total       Date                     Value               Ref Range           Status                07/17/2023               0.2                 >0.0 - 1.2 mg/*     Final            ----------  Alk Phos       Date                     Value               Ref Range           Status                07/17/2023               179 (H)             55 - 142 U/L        Final            ----------  AST       Date                     Value               Ref Range           Status                07/17/2023               21                  15 - 37 U/L         Final            ----------  ALT       Date                     Value               Ref Range           Status                07/17/2023               39                  13 - 56 U/L         Final            ----------  Anion Gap       Date                     Value               Ref Range           Status                07/17/2023               11                  7 - 16 mmol/L       Final            ----------  eGFR       Date                     Value               Ref Range           Status                07/17/2023               67                  >=60 mL/min/1.*     Final            ----------      Review of Systems   Constitutional:  Negative for activity change, appetite change, diaphoresis and fatigue.   HENT:  Negative for nasal congestion, facial swelling and sinus pressure/congestion.    Eyes:  Negative for visual disturbance.   Respiratory:  Negative for shortness of breath and wheezing.    Cardiovascular:  Negative for chest pain and leg swelling.   Gastrointestinal:  Negative for constipation, diarrhea, nausea and vomiting.   Endocrine: Negative for polydipsia, polyphagia and  polyuria.   Genitourinary:  Negative for dysuria, frequency and urgency.   Musculoskeletal:  Negative for gait problem and myalgias.   Integumentary:  Negative for color change, rash and wound.   Neurological:  Negative for dizziness, syncope, weakness, headaches, coordination difficulties and coordination difficulties.   Hematological:  Does not bruise/bleed easily.   Psychiatric/Behavioral:  Negative for self-injury, sleep disturbance and suicidal ideas. The patient is not nervous/anxious.         Objective     Physical Exam  Vitals and nursing note reviewed.   Constitutional:       Appearance: Normal appearance.   HENT:      Head: Normocephalic.   Neck:      Thyroid: No thyromegaly.      Vascular: No carotid bruit.   Cardiovascular:      Rate and Rhythm: Normal rate and regular rhythm.      Pulses:           Dorsalis pedis pulses are 2+ on the right side and 2+ on the left side.        Posterior tibial pulses are 2+ on the right side and 2+ on the left side.      Heart sounds: Normal heart sounds.   Pulmonary:      Effort: Pulmonary effort is normal.      Breath sounds: Normal breath sounds.   Musculoskeletal:         General: Normal range of motion.      Right foot: Normal range of motion.      Left foot: Normal range of motion.   Feet:      Right foot:      Protective Sensation: 6 sites tested.  6 sites sensed.      Skin integrity: Skin integrity normal.      Left foot:      Protective Sensation: 6 sites tested.  6 sites sensed.      Skin integrity: Skin integrity normal.   Skin:     General: Skin is warm and dry.   Neurological:      General: No focal deficit present.      Mental Status: She is alert and oriented to person, place, and time.   Psychiatric:         Mood and Affect: Mood normal.         Behavior: Behavior normal.         Thought Content: Thought content normal.         Judgment: Judgment normal.        1. Type 1 diabetes mellitus with hypoglycemia and without coma  Call at any time for download  or further evaluation if need be after todays changes  We will refer to omnipod per pt request.  Education provided and discussed at length    -     POCT Glucose, Hand-Held Device  -     Ambulatory referral/consult to Diabetic Advanced Practice Providers (Medical Management)  -     insulin aspart U-100 (NOVOLOG) 100 unit/mL injection; Use to inject up to 100 units per insulin pump daily.  Dispense: 100 mL; Refill: 3  2. Hypertension  ARB coverage    3. Chronic pain syndrome      4. Acquired hypothyroidism  Take synthroid as directed    5. Hyperlipidemia, unspecified hyperlipidemia type  Has been on repatha in the past but new insurance will not cover it.  Statin intolerant.  Will start zetia.  And will retry repatha in 3 months     Other orders  -     ezetimibe (ZETIA) 10 mg tablet; Take 1 tablet (10 mg total) by mouth once daily.  Dispense: 90 tablet; Refill: 3

## 2023-08-22 DIAGNOSIS — M81.0 OSTEOPOROSIS, UNSPECIFIED OSTEOPOROSIS TYPE, UNSPECIFIED PATHOLOGICAL FRACTURE PRESENCE: Primary | ICD-10-CM

## 2023-08-22 RX ORDER — BRIMONIDINE TARTRATE 2 MG/ML
SOLUTION/ DROPS OPHTHALMIC
COMMUNITY
Start: 2023-08-16

## 2023-08-24 ENCOUNTER — OFFICE VISIT (OUTPATIENT)
Dept: RHEUMATOLOGY | Facility: CLINIC | Age: 68
End: 2023-08-24
Payer: MEDICARE

## 2023-08-24 ENCOUNTER — OFFICE VISIT (OUTPATIENT)
Dept: DIABETES SERVICES | Facility: CLINIC | Age: 68
End: 2023-08-24
Payer: MEDICARE

## 2023-08-24 VITALS
WEIGHT: 135 LBS | HEIGHT: 59 IN | OXYGEN SATURATION: 97 % | BODY MASS INDEX: 27.21 KG/M2 | SYSTOLIC BLOOD PRESSURE: 110 MMHG | HEART RATE: 75 BPM | RESPIRATION RATE: 16 BRPM | DIASTOLIC BLOOD PRESSURE: 52 MMHG

## 2023-08-24 VITALS
BODY MASS INDEX: 27.21 KG/M2 | RESPIRATION RATE: 14 BRPM | OXYGEN SATURATION: 99 % | HEIGHT: 59 IN | DIASTOLIC BLOOD PRESSURE: 82 MMHG | WEIGHT: 135 LBS | HEART RATE: 72 BPM | SYSTOLIC BLOOD PRESSURE: 136 MMHG

## 2023-08-24 DIAGNOSIS — M81.0 OSTEOPOROSIS, UNSPECIFIED OSTEOPOROSIS TYPE, UNSPECIFIED PATHOLOGICAL FRACTURE PRESENCE: Primary | ICD-10-CM

## 2023-08-24 DIAGNOSIS — E10.649 TYPE 1 DIABETES MELLITUS WITH HYPOGLYCEMIA AND WITHOUT COMA: Primary | ICD-10-CM

## 2023-08-24 DIAGNOSIS — I10 PRIMARY HYPERTENSION: ICD-10-CM

## 2023-08-24 DIAGNOSIS — E78.5 HYPERLIPIDEMIA, UNSPECIFIED HYPERLIPIDEMIA TYPE: ICD-10-CM

## 2023-08-24 DIAGNOSIS — E03.9 ACQUIRED HYPOTHYROIDISM: ICD-10-CM

## 2023-08-24 DIAGNOSIS — G89.4 CHRONIC PAIN SYNDROME: ICD-10-CM

## 2023-08-24 LAB — GLUCOSE SERPL-MCNC: 228 MG/DL (ref 70–110)

## 2023-08-24 PROCEDURE — 99999PBSHW POCT GLUCOSE, HAND-HELD DEVICE: Mod: PBBFAC,,,

## 2023-08-24 PROCEDURE — 99215 OFFICE O/P EST HI 40 MIN: CPT | Mod: S$PBB,,, | Performed by: NURSE PRACTITIONER

## 2023-08-24 PROCEDURE — 95251 PR GLUCOSE MONITOR, 72 HOUR, PHYS INTERP: ICD-10-PCS | Mod: ,,, | Performed by: NURSE PRACTITIONER

## 2023-08-24 PROCEDURE — 99215 PR OFFICE/OUTPT VISIT, EST, LEVL V, 40-54 MIN: ICD-10-PCS | Mod: S$PBB,,, | Performed by: NURSE PRACTITIONER

## 2023-08-24 PROCEDURE — 82962 GLUCOSE BLOOD TEST: CPT | Mod: PBBFAC | Performed by: NURSE PRACTITIONER

## 2023-08-24 PROCEDURE — 99215 OFFICE O/P EST HI 40 MIN: CPT | Mod: PBBFAC | Performed by: NURSE PRACTITIONER

## 2023-08-24 PROCEDURE — 99214 PR OFFICE/OUTPT VISIT, EST, LEVL IV, 30-39 MIN: ICD-10-PCS | Mod: S$PBB,,, | Performed by: NURSE PRACTITIONER

## 2023-08-24 PROCEDURE — 99999PBSHW POCT GLUCOSE, HAND-HELD DEVICE: ICD-10-PCS | Mod: PBBFAC,,,

## 2023-08-24 PROCEDURE — 95251 CONT GLUC MNTR ANALYSIS I&R: CPT | Mod: ,,, | Performed by: NURSE PRACTITIONER

## 2023-08-24 PROCEDURE — 99214 OFFICE O/P EST MOD 30 MIN: CPT | Mod: S$PBB,,, | Performed by: NURSE PRACTITIONER

## 2023-08-24 RX ORDER — EZETIMIBE 10 MG/1
10 TABLET ORAL DAILY
Qty: 90 TABLET | Refills: 3 | Status: SHIPPED | OUTPATIENT
Start: 2023-08-24 | End: 2024-08-23

## 2023-08-24 RX ORDER — INSULIN ASPART 100 [IU]/ML
INJECTION, SOLUTION INTRAVENOUS; SUBCUTANEOUS
Qty: 100 ML | Refills: 3 | Status: SHIPPED | OUTPATIENT
Start: 2023-08-24 | End: 2024-03-14

## 2023-08-24 NOTE — PROGRESS NOTES
RHEUMATOLOGY OUTPATIENT CLINIC NOTE  Subjective:       Patient ID: Nelida Nicholas is a 68 y.o. female.    Chief Complaint: Osteoporosis (Patient here for prolia injection. Last one [id number])       History of Present Illness: 67 y/o female here today as a new patient referral from [facility] for continuation of care. Previously received Prolia injections from Dr. DENISSE Talbot for Osteoporosis. Patient presents today with brother. Is in wheelchair; however, states that she is able to ambulate but takes very small steps and is currently participating with home health physical therapy for strengthening. States she has lost her independence and confidence with ambulating after falling. Encouraged to participate and learn while PT is ordered and to continue home exercises when discharged from PT. Denies any fractures after falling. Is taking Vitamin D and Calcium supplement. Will continue Osteoporosis treatment with Prolia.     Past Medical History:   Diagnosis Date    Allergic rhinitis     Back pain, thoracic     Brittle diabetes     Carotid bruit     right    Chronic anemia     Chronic major depressive disorder, recurrent episode     Chronic migraine w/o aura, not intractable, w/o stat migr     Chronic pain syndrome     Constipation     [place] or callus     Coronary arteriosclerosis     S/P CABBAGE 1997    Degenerative joint disease involving multiple joints     Diabetes mellitus type I     Elevated levels of transaminase & lactic acid dehydrogenase     Encounter for long-term (current) use of insulin     Encounter for long-term (current) use of other medications     Epigastric pain     Fatigue     GERD (gastroesophageal reflux disease)     Hypercalcemia     Hyperkalemia     Hyperlipidemia     Hypo-osmolar hyponatremia     Hypothyroidism     Lumbosacral spondylosis with myelopathy     Memory loss     Osteoporosis     Restless legs syndrome (RLS)     Scoliosis deformity of spine     Sleep disorder      "Vitamin D deficiency      Social History     Tobacco Use    Smoking status: Former     Current packs/day: 0.00     Types: Cigarettes     Start date:      Quit date:      Years since quittin.6    Smokeless tobacco: Never    Tobacco comments:     1 pack per week   Substance Use Topics    Alcohol use: Never     Review of patient's allergies indicates:   Allergen Reactions    Klonopin [clonazepam]     Nalbuphine     Opioids-meperidine and related     Statins-hmg-coa reductase inhibitors Other (See Comments)     Elevated Liver Enzymes    Toradol [ketorolac]        Objective:   BP (!) 110/52 (BP Location: Left arm, Patient Position: Sitting, BP Method: Large (Manual))   Pulse 75   Resp 16   Ht 4' 11" (1.499 m)   Wt 61.2 kg (135 lb)   SpO2 97%   BMI 27.27 kg/m²     Immunization History   Administered Date(s) Administered    Influenza (FLUAD) - Quadrivalent - Adjuvanted - PF *Preferred* (65+) 2022    Pneumococcal Conjugate - 13 Valent 2019    Pneumococcal Polysaccharide - 23 Valent 2015       Current Outpatient Medications   Medication Instructions    aspirin (ECOTRIN) 81 mg, Oral, Daily    azelastine (ASTELIN) 137 mcg (0.1 %) nasal spray INHALE 1-2 SPRAYS IN EACH NOSTRIL TWICE DAILY AS NEEDED ..SHAKE GENTLY BEFORE USE    brexpiprazole (REXULTI) 2 mg Tab 1 tablet, Oral, Daily    brimonidine 0.2% (ALPHAGAN) 0.2 % Drop PLACE 1 DROP INTO BOTH EYES 3 TIMES A DAY AS DIRECTED    carvediloL (COREG) 12.5 MG tablet TAKE 1 TABLET BY MOUTH TWICE DAILY FOR BLOOD PRESSURE &/OR HEART "TAKE WITH FOOD" Strength: 12.5 mg    cetirizine (ZYRTEC) 10 mg, Oral, Daily    colesevelam (WELCHOL) 625 mg tablet One tab TID    diclofenac sodium (VOLTAREN) 2 g, Topical (Top), 4 times daily PRN    DULoxetine (CYMBALTA) 60 mg, Oral, Daily    flash glucose scanning reader (FREESTYLE GUICHO 2 READER) [place] Use to test BG    flash glucose sensor (FREESTYLE GUICHO 2 SENSOR) Kit Use as directed    fluticasone propionate " (FLONASE) 100 mcg, Each Nostril, Daily    furosemide (LASIX) 20 mg, Oral, Daily, Prn fluid    gabapentin (NEURONTIN) 100 mg, Oral, 2 times daily    gabapentin (NEURONTIN) 800 mg, Oral, Nightly    HYDROcodone-acetaminophen (NORCO)  mg per tablet 1 tablet, Oral, Every 6 hours PRN    hydrOXYzine pamoate (VISTARIL) 25 mg, Oral, Every 6 hours PRN    insulin aspart U-100 (NOVOLOG) 21 Units, Subcutaneous, Daily    latanoprost 0.005 % ophthalmic solution PLACE 1 DROP INTO BOTH EYES AT BEDTIME AS DIRECTED    LINZESS 72 mcg, Oral, Daily    losartan (COZAAR) 100 mg, Oral, Daily    meloxicam (MOBIC) 7.5 mg, Oral, Daily    montelukast (SINGULAIR) 10 mg, Oral, Daily    mupirocin (BACTROBAN) 2 % ointment Topical (Top), 3 times daily    MYRBETRIQ 50 mg, Oral, Daily    nystatin (MYCOSTATIN) cream APPLY TO AFFECTED AREA(S) TWICE DAILY AS DIRECTED    REPATHA SURECLICK 140 mg, Subcutaneous, Every 14 days    silver sulfADIAZINE 1% (SILVADENE) 1 % cream Topical (Top), Daily    sulfamethoxazole-trimethoprim 800-160mg (BACTRIM DS) 800-160 mg Tab 1 tablet, Oral, 2 times daily    SYNTHROID 150 mcg, Oral, Before breakfast    tiZANidine (ZANAFLEX) 4 MG tablet TAKE 1 TABLET BY MOUTH 4 TIMES A DAY AS NEEDED FOR MUSCLE SPASM    traZODone (DESYREL) 150 mg, Oral, Nightly        BIOLOGIC LABS  Lab Results   Component Value Date    HAV Non-Reactive 07/22/2021    HEPBIGM Non-Reactive 07/22/2021    HEPCAB Non-Reactive 07/22/2021      Assessment:       1. Osteoporosis of multiple sites      Review of Systems   Constitutional:  Negative for fever.   Eyes:  Negative for pain and redness.   Respiratory:  Negative for cough and shortness of breath.    Cardiovascular:  Negative for chest pain.   Gastrointestinal:  Negative for nausea and vomiting.   Musculoskeletal:  Positive for falls.   Skin:  Negative for rash.   Endo/Heme/Allergies:  Bruises/bleeds easily.        Physical Exam  Vitals reviewed.   Constitutional:       General: She is not in acute  distress.  Eyes:      Comments: Wears glasses   Pulmonary:      Effort: Pulmonary effort is normal.   Musculoskeletal:      Cervical back: No rigidity.   Neurological:      Mental Status: She is alert.      Plan:       Calcium reviewed and normal  Proceed with Prolia injection today, Calcium to be drawn day of next Prolia injection appt which will be due 2/24/2024.      45 minutes of total time spent on the encounter, which includes face to face time and non-face to face time preparing to see the patient (eg, review of tests), Obtaining and/or reviewing separately obtained history, Documenting clinical information in the electronic or other health record, Independently interpreting results (not separately reported) and communicating results to the patient/family/caregiver, or Care coordination (not separately reported).            Sulma Arreaga, CHARLOTTEP  [facility] - RHEUMATOLOGY  [address] [facility] [place] [address]  [phone]

## 2023-09-05 ENCOUNTER — PATIENT MESSAGE (OUTPATIENT)
Dept: DIABETES SERVICES | Facility: CLINIC | Age: 68
End: 2023-09-05
Payer: MEDICARE

## 2023-09-05 ENCOUNTER — DOCUMENT SCAN (OUTPATIENT)
Dept: HOME HEALTH SERVICES | Facility: HOSPITAL | Age: 68
End: 2023-09-05
Payer: MEDICARE

## 2023-09-07 ENCOUNTER — OFFICE VISIT (OUTPATIENT)
Dept: GASTROENTEROLOGY | Facility: CLINIC | Age: 68
End: 2023-09-07
Payer: MEDICARE

## 2023-09-07 ENCOUNTER — EXTERNAL HOME HEALTH (OUTPATIENT)
Dept: HOME HEALTH SERVICES | Facility: HOSPITAL | Age: 68
End: 2023-09-07
Payer: MEDICARE

## 2023-09-07 VITALS
SYSTOLIC BLOOD PRESSURE: 118 MMHG | BODY MASS INDEX: 26.61 KG/M2 | HEIGHT: 59 IN | DIASTOLIC BLOOD PRESSURE: 55 MMHG | WEIGHT: 132 LBS | HEART RATE: 80 BPM

## 2023-09-07 DIAGNOSIS — D64.9 ANEMIA, UNSPECIFIED TYPE: ICD-10-CM

## 2023-09-07 DIAGNOSIS — K59.00 CONSTIPATION, UNSPECIFIED CONSTIPATION TYPE: ICD-10-CM

## 2023-09-07 DIAGNOSIS — R94.5 ABNORMAL RESULTS OF LIVER FUNCTION STUDIES: Primary | ICD-10-CM

## 2023-09-07 PROCEDURE — 99214 OFFICE O/P EST MOD 30 MIN: CPT | Mod: S$PBB,,, | Performed by: NURSE PRACTITIONER

## 2023-09-07 PROCEDURE — 99214 PR OFFICE/OUTPT VISIT, EST, LEVL IV, 30-39 MIN: ICD-10-PCS | Mod: S$PBB,,, | Performed by: NURSE PRACTITIONER

## 2023-09-07 PROCEDURE — 99215 OFFICE O/P EST HI 40 MIN: CPT | Mod: PBBFAC | Performed by: NURSE PRACTITIONER

## 2023-09-07 RX ORDER — INSULIN PUMP CONTROLLER
1 EACH MISCELLANEOUS DAILY
Qty: 1 EACH | Refills: 0 | Status: SHIPPED | OUTPATIENT
Start: 2023-09-07 | End: 2023-10-26 | Stop reason: SDUPTHER

## 2023-09-07 RX ORDER — LEVOTHYROXINE SODIUM 150 MCG
150 TABLET ORAL
Qty: 90 TABLET | Refills: 3 | Status: SHIPPED | OUTPATIENT
Start: 2023-09-07 | End: 2024-09-01

## 2023-09-07 RX ORDER — INSULIN PUMP CONTROLLER
1 EACH MISCELLANEOUS
Qty: 30 EACH | Refills: 3 | Status: SHIPPED | OUTPATIENT
Start: 2023-09-07 | End: 2023-10-26 | Stop reason: SDUPTHER

## 2023-09-07 NOTE — PROGRESS NOTES
Nelida Nicholas is a 68 y.o. female here for Follow-up        PCP: Cindy Jensen  Referring Provider: No referring provider defined for this encounter.     HPI:  Presents for follow-up with history of elevated liver enzymes.  Patient is legally blind.  She does have diabetes type 1.  Labs from 07/17 reviewed, alkaline phos 179, ALT and AST are normal.  HGB is 10.6 and HCT is 33.6.  Reports frequent falls. Colonoscopy on 01/30/2023, no colon polyps, retrained stool.  Patient reports that she has reduced MiraLax powder to once daily because she feels that taking twice daily causes too much loose stool.  Reports labile glucose levels.  Is following with Cindy Ortega.  States that she has been trying to lose weight.  Weight is decreased from 152 lbs 12/2022 to 132 lbs. Occasional nausea.    Follow-up  Associated symptoms include nausea. Pertinent negatives include no abdominal pain, change in bowel habit, chest pain, fatigue, fever or vomiting.         ROS:  Review of Systems   Constitutional:  Negative for appetite change, fatigue, fever and unexpected weight change.   HENT:  Negative for trouble swallowing.    Eyes:  Positive for visual disturbance.   Respiratory:  Negative for shortness of breath.    Cardiovascular:  Negative for chest pain.   Gastrointestinal:  Positive for constipation and nausea. Negative for abdominal pain, blood in stool, change in bowel habit, diarrhea, vomiting, reflux and change in bowel habit.   Musculoskeletal:  Positive for gait problem.   Integumentary:  Negative for pallor.   Psychiatric/Behavioral:  The patient is not nervous/anxious.           PMHX:  has a past medical history of Allergic rhinitis, Back pain, thoracic, Brittle diabetes, Carotid bruit, Chronic anemia, Chronic major depressive disorder, recurrent episode, Chronic migraine w/o aura, not intractable, w/o stat migr, Chronic pain syndrome, Constipation, Corn or callus, Coronary arteriosclerosis, Degenerative joint disease  involving multiple joints, Diabetes mellitus type I, Elevated levels of transaminase & lactic acid dehydrogenase, Encounter for long-term (current) use of insulin, Encounter for long-term (current) use of other medications, Epigastric pain, Fatigue, GERD (gastroesophageal reflux disease), Hypercalcemia, Hyperkalemia, Hyperlipidemia, Hypo-osmolar hyponatremia, Hypothyroidism, Lumbosacral spondylosis with myelopathy, Memory loss, Osteoporosis, Restless legs syndrome (RLS), Scoliosis deformity of spine, Sleep disorder, and Vitamin D deficiency.    PSHX:  has a past surgical history that includes Esophagogastroduodenoscopy (03/11/2011); Echocardiography (06/27/2012); Finger surgery; Carpal tunnel release; Cholecystectomy; Reduction of both breasts; Coronary artery bypass graft (1997); ARTERIAL DOPPLER (11/07/2017); ARTERIAL DOPPLER DUPLEX (Bilateral, 11/07/2017); CAROTID DOPPLER (11/07/2017); Neck surgery; Shoulder surgery; Tubal ligation; Breast biopsy; Total Reduction Mammoplasty; and Bypass Graft (N/A, 1999).    PFHX: family history includes Breast cancer in her maternal aunt; Heart disease in her father, maternal grandmother, and paternal grandfather; Hyperlipidemia in her father; Hypertension in her brother, father, and mother.    PSlHX:  reports that she quit smoking about 35 years ago. Her smoking use included cigarettes. She started smoking about 45 years ago. She has never used smokeless tobacco. She reports that she does not drink alcohol and does not use drugs.        Review of patient's allergies indicates:   Allergen Reactions    Klonopin [clonazepam]     Nalbuphine     Opioids-meperidine and related     Statins-hmg-coa reductase inhibitors Other (See Comments)     Elevated Liver Enzymes    Toradol [ketorolac]        Medication List with Changes/Refills   Current Medications    ASPIRIN (ECOTRIN) 81 MG EC TABLET    Take 81 mg by mouth once daily.    AZELASTINE (ASTELIN) 137 MCG (0.1 %) NASAL SPRAY    INHALE  "1-2 SPRAYS IN EACH NOSTRIL TWICE DAILY AS NEEDED ..SHAKE GENTLY BEFORE USE    BREXPIPRAZOLE (REXULTI) 2 MG TAB    Take 1 tablet by mouth once daily.    BRIMONIDINE 0.2% (ALPHAGAN) 0.2 % DROP    PLACE 1 DROP INTO BOTH EYES 3 TIMES A DAY AS DIRECTED    CARVEDILOL (COREG) 12.5 MG TABLET    TAKE 1 TABLET BY MOUTH TWICE DAILY FOR BLOOD PRESSURE &/OR HEART "TAKE WITH FOOD" Strength: 12.5 mg    CETIRIZINE (ZYRTEC) 10 MG TABLET    Take 1 tablet (10 mg total) by mouth once daily.    COLESEVELAM (WELCHOL) 625 MG TABLET    One tab TID    DICLOFENAC SODIUM (VOLTAREN) 1 % GEL    Apply 2 g topically 4 (four) times daily as needed.    DULOXETINE (CYMBALTA) 60 MG CAPSULE    Take 1 capsule (60 mg total) by mouth once daily.    EVOLOCUMAB (REPATHA SURECLICK) 140 MG/ML PNIJ    Inject 1 mL (140 mg total) into the skin every 14 (fourteen) days.    EZETIMIBE (ZETIA) 10 MG TABLET    Take 1 tablet (10 mg total) by mouth once daily.    FLASH GLUCOSE SCANNING READER (FREESTYLE GUCIHO 2 READER) [facility] [place]    Use to test BG    FLASH GLUCOSE SENSOR (FREESTYLE GUICHO 2 SENSOR) KIT    Use as directed    FLUTICASONE PROPIONATE (FLONASE) 50 MCG/ACTUATION NASAL SPRAY    2 sprays (100 mcg total) by Each Nostril route once daily.    FUROSEMIDE (LASIX) 20 MG TABLET    Take 1 tablet (20 mg total) by mouth once daily. Prn fluid    GABAPENTIN (NEURONTIN) 100 MG CAPSULE    Take 1 capsule (100 mg total) by mouth 2 (two) times daily.    GABAPENTIN (NEURONTIN) 800 MG TABLET    Take 1 tablet (800 mg total) by mouth every evening.    HYDROCODONE-ACETAMINOPHEN (NORCO)  MG PER TABLET    Take 1 tablet by mouth every 6 (six) hours as needed for Pain.    HYDROXYZINE PAMOATE (VISTARIL) 25 MG CAP    Take 1 capsule (25 mg total) by mouth every 6 (six) hours as needed (nerves, itching, insomnia, nausea).    INSULIN ASPART U-100 (NOVOLOG) 100 UNIT/ML INJECTION    Use to inject up to 100 units per insulin pump daily.    INSULIN PUMP CART,CONT BT-CNTR (OMNIPOD DASH INTRO " "KIT, GEN 4,) CRTG    Inject 1 each into the skin Daily.    INSULIN PUMP CART,CONT INF,BT (OMNIPOD DASH PODS, GEN 4,) CRTG    Inject 1 each into the skin Every 3 (three) days.    LATANOPROST 0.005 % OPHTHALMIC SOLUTION    PLACE 1 DROP INTO BOTH EYES AT BEDTIME AS DIRECTED    LINZESS 72 MCG CAP CAPSULE    Take 1 capsule (72 mcg total) by mouth once daily.    LOSARTAN (COZAAR) 100 MG TABLET    Take 1 tablet (100 mg total) by mouth once daily.    MELOXICAM (MOBIC) 7.5 MG TABLET    Take 1 tablet (7.5 mg total) by mouth once daily.    MIRABEGRON (MYRBETRIQ) 50 MG TB24    Take 1 tablet (50 mg total) by mouth once daily.    MONTELUKAST (SINGULAIR) 10 MG TABLET    Take 1 tablet (10 mg total) by mouth once daily.    MUPIROCIN (BACTROBAN) 2 % OINTMENT    Apply topically 3 (three) times daily.    NYSTATIN (MYCOSTATIN) CREAM    APPLY TO AFFECTED AREA(S) TWICE DAILY AS DIRECTED    SILVER SULFADIAZINE 1% (SILVADENE) 1 % CREAM    Apply topically once daily.    SULFAMETHOXAZOLE-TRIMETHOPRIM 800-160MG (BACTRIM DS) 800-160 MG TAB    Take 1 tablet by mouth 2 (two) times daily.    SYNTHROID 150 MCG TABLET    Take 1 tablet (150 mcg total) by mouth before breakfast.    TIZANIDINE (ZANAFLEX) 4 MG TABLET    TAKE 1 TABLET BY MOUTH 4 TIMES A DAY AS NEEDED FOR MUSCLE SPASM    TRAZODONE (DESYREL) 150 MG TABLET    Take 1 tablet (150 mg total) by mouth every evening.        Objective Findings:  Vital Signs:  BP (!) 118/55   Pulse 80   Ht 4' 11" (1.499 m)   Wt 59.9 kg (132 lb)   BMI 26.66 kg/m²  Body mass index is 26.66 kg/m².    Physical Exam:  Physical Exam  Vitals and nursing note reviewed.   Constitutional:       General: She is not in acute distress.     Appearance: Normal appearance.   HENT:      Mouth/Throat:      Mouth: Mucous membranes are moist.   Cardiovascular:      Rate and Rhythm: Normal rate.   Pulmonary:      Effort: Pulmonary effort is normal.      Breath sounds: No wheezing, rhonchi or rales.   Abdominal:      General: Bowel " sounds are normal. There is no distension.      Palpations: Abdomen is soft. There is no mass.      Tenderness: There is no abdominal tenderness. There is no guarding.      Hernia: No hernia is present.   Skin:     General: Skin is warm and dry.      Coloration: Skin is not jaundiced or pale.   Neurological:      Mental Status: She is alert and oriented to person, place, and time.      Gait: Gait abnormal.   Psychiatric:         Mood and Affect: Mood normal.          Labs:  Lab Results   Component Value Date    WBC 7.68 07/17/2023    HGB 10.6 (L) 07/17/2023    HCT 33.6 (L) 07/17/2023    MCV 97.4 (H) 07/17/2023    RDW 14.1 07/17/2023     07/17/2023    LYMPH 25.1 (L) 07/17/2023    LYMPH 1.93 07/17/2023    MONO 8.3 (H) 07/17/2023    EOS 0.27 07/17/2023    BASO 0.03 07/17/2023     Lab Results   Component Value Date     07/17/2023    K 3.8 07/17/2023     (H) 07/17/2023    CO2 23 07/17/2023     07/17/2023    BUN 8 07/17/2023    CREATININE 0.93 07/17/2023    CALCIUM 9.0 08/24/2023    PROT 7.2 07/17/2023    ALBUMIN 3.1 (L) 07/17/2023    BILITOT 0.2 07/17/2023    ALKPHOS 179 (H) 07/17/2023    AST 21 07/17/2023    ALT 39 07/17/2023         Imaging: MRI Thoracic Spine Without Contrast    Result Date: 8/17/2023  EXAMINATION: MRI THORACIC SPINE WITHOUT CONTRAST CLINICAL HISTORY: Mid-back pain;Myelopathy, chronic, thoracic spine;  Pain in thoracic spine TECHNIQUE: Multiplanar, multisequence images were performed through the thoracic spine.  Contrast was not administered. COMPARISON: Thoracic spine radiograph 08/07/2023 FINDINGS: The slight rightward curvature seen on the recent thoracic spine radiograph is better seen on that study given the AP imaging performed.  The vertebral body heights and sagittal alignment are maintained.  The marrow signal is normal.  There are degenerative disc signal throughout the thoracic spine.  No spinal canal stenosis.  No appreciable foraminal stenosis.  Facet  degeneration seen throughout the mid and lower thoracic levels.  No abnormal cord signal.     Degenerative disc and facet changes most notably of the mid and lower thoracic levels without spinal canal or foraminal stenosis. Seen on the localizer images are multilevel degenerative changes also of the mid cervical spine. Electronically signed by: Bryan Blandon Date:    08/17/2023 Time:    09:31        Assessment:  Nelida Nicholas is a 68 y.o. female here with:  1. Abnormal results of liver function studies    2. Anemia, unspecified type    3. Constipation, unspecified constipation type          Recommendations:  1. Abdominal ultrasound  2. CBC, CMP, Iron studies  3. Consider EGD if iron deficient    Follow up in about 3 months (around 12/7/2023).      Order summary:  Orders Placed This Encounter    US Abdomen Limited    CBC Auto Differential    Comprehensive Metabolic Panel    Iron and TIBC    Ferritin       Thank you for allowing me to participate in the care of Nelida Nicholas.      CYRIL Mir

## 2023-09-12 ENCOUNTER — TELEPHONE (OUTPATIENT)
Dept: FAMILY MEDICINE | Facility: CLINIC | Age: 68
End: 2023-09-12

## 2023-09-12 DIAGNOSIS — R30.0 DYSURIA: Primary | ICD-10-CM

## 2023-09-12 RX ORDER — SULFAMETHOXAZOLE AND TRIMETHOPRIM 800; 160 MG/1; MG/1
1 TABLET ORAL 2 TIMES DAILY
Qty: 28 TABLET | Refills: 0 | Status: SHIPPED | OUTPATIENT
Start: 2023-09-12 | End: 2023-09-26

## 2023-09-13 ENCOUNTER — TELEPHONE (OUTPATIENT)
Dept: GASTROENTEROLOGY | Facility: CLINIC | Age: 68
End: 2023-09-13
Payer: MEDICARE

## 2023-09-13 ENCOUNTER — HOSPITAL ENCOUNTER (OUTPATIENT)
Dept: RADIOLOGY | Facility: HOSPITAL | Age: 68
Discharge: HOME | End: 2023-09-13
Attending: NURSE PRACTITIONER
Payer: MEDICARE

## 2023-09-13 DIAGNOSIS — R71.8 ELEVATED MCV: Primary | ICD-10-CM

## 2023-09-13 DIAGNOSIS — D53.1 OTHER MEGALOBLASTIC ANEMIAS, NOT ELSEWHERE CLASSIFIED: ICD-10-CM

## 2023-09-13 DIAGNOSIS — R94.5 ABNORMAL RESULTS OF LIVER FUNCTION STUDIES: ICD-10-CM

## 2023-09-13 PROCEDURE — 76705 ECHO EXAM OF ABDOMEN: CPT | Mod: TC

## 2023-09-13 PROCEDURE — 76705 US ABDOMEN LIMITED: ICD-10-PCS | Mod: 26,,, | Performed by: RADIOLOGY

## 2023-09-13 PROCEDURE — 76705 ECHO EXAM OF ABDOMEN: CPT | Mod: 26,,, | Performed by: RADIOLOGY

## 2023-09-13 NOTE — PROGRESS NOTES
Subjective:         Patient ID: Nelida Nicholas is a 68 y.o. female.    Chief Complaint: Mid-back Pain      Pain  This is a chronic problem. The current episode started more than 1 year ago. The problem occurs daily. The problem has been waxing and waning. Associated symptoms include arthralgias. Pertinent negatives include no anorexia, change in bowel habit, chest pain, chills, coughing, diaphoresis, fever, neck pain, rash, sore throat, swollen glands, urinary symptoms, vertigo or vomiting.     Review of Systems   Constitutional:  Negative for activity change, appetite change, chills, diaphoresis, fever and unexpected weight change.   HENT:  Negative for drooling, ear discharge, ear pain, facial swelling, nosebleeds, sore throat, trouble swallowing, voice change and goiter.    Eyes:  Negative for photophobia, pain, discharge, redness and visual disturbance.   Respiratory:  Negative for apnea, cough, choking, chest tightness, shortness of breath, wheezing and stridor.    Cardiovascular:  Negative for chest pain, palpitations and leg swelling.   Gastrointestinal:  Negative for abdominal distention, anorexia, change in bowel habit, diarrhea, rectal pain, vomiting, fecal incontinence and change in bowel habit.   Endocrine: Negative for cold intolerance, heat intolerance, polydipsia, polyphagia and polyuria.   Genitourinary:  Negative for bladder incontinence, dysuria, flank pain, frequency and hot flashes.   Musculoskeletal:  Positive for arthralgias, back pain and leg pain. Negative for neck pain.   Integumentary:  Negative for color change, pallor and rash.   Allergic/Immunologic: Negative for immunocompromised state.   Neurological:  Negative for dizziness, vertigo, seizures, syncope, facial asymmetry, speech difficulty, light-headedness, coordination difficulties, memory loss and coordination difficulties.   Hematological:  Negative for adenopathy. Does not bruise/bleed easily.   Psychiatric/Behavioral:  Negative  for agitation, behavioral problems, confusion, decreased concentration, dysphoric mood, hallucinations, self-injury and suicidal ideas. The patient is not nervous/anxious and is not hyperactive.            Past Medical History:   Diagnosis Date    Allergic rhinitis     Back pain, thoracic     Brittle diabetes     Carotid bruit     right    Chronic anemia     Chronic major depressive disorder, recurrent episode     Chronic migraine w/o aura, not intractable, w/o stat migr     Chronic pain syndrome     Constipation     Zurich or callus     Coronary arteriosclerosis     S/P CABBAGE 1997    Degenerative joint disease involving multiple joints     Diabetes mellitus type I     Elevated levels of transaminase & lactic acid dehydrogenase     Encounter for long-term (current) use of insulin     Encounter for long-term (current) use of other medications     Epigastric pain     Fatigue     GERD (gastroesophageal reflux disease)     Hypercalcemia     Hyperkalemia     Hyperlipidemia     Hypo-osmolar hyponatremia     Hypothyroidism     Lumbosacral spondylosis with myelopathy     Memory loss     Osteoporosis     Restless legs syndrome (RLS)     Scoliosis deformity of spine     Sleep disorder     Vitamin D deficiency      Past Surgical History:   Procedure Laterality Date    ARTERIAL DOPPLER  11/07/2017    ARTERIAL DOPPLER DUPLEX Bilateral 11/07/2017    LEGS    BREAST BIOPSY      BYPASS GRAFT N/A 1999    tripple bypass surgery    CAROTID DOPPLER  11/07/2017    COMPLETE    CARPAL TUNNEL RELEASE      CHOLECYSTECTOMY      CORONARY ARTERY BYPASS GRAFT  1997    3 VESSLE BYPASS    ECHOCARDIOGRAPHY  06/27/2012    with doppler color flow    ESOPHAGOGASTRODUODENOSCOPY  03/11/2011    DIL AND BX    FINGER SURGERY      Trigger finger release all 10 fingers    NECK SURGERY      REDUCTION OF BOTH BREASTS      SHOULDER SURGERY      TOTAL REDUCTION MAMMOPLASTY      TUBAL LIGATION       Social History     Socioeconomic History    Marital status:  "   Occupational History    Occupation: retired   Tobacco Use    Smoking status: Former     Current packs/day: 0.00     Types: Cigarettes     Start date:      Quit date:      Years since quittin.7    Smokeless tobacco: Never    Tobacco comments:     1 pack per week   Substance and Sexual Activity    Alcohol use: Never    Drug use: Never    Sexual activity: Not Currently     Family History   Problem Relation Age of Onset    Hypertension Mother     Heart disease Father     Hyperlipidemia Father     Hypertension Father     Breast cancer Maternal Aunt     Heart disease Maternal Grandmother     Heart disease Paternal Grandfather     Hypertension Brother      Review of patient's allergies indicates:   Allergen Reactions    Klonopin [clonazepam]     Nalbuphine     Opioids-meperidine and related     Statins-hmg-coa reductase inhibitors Other (See Comments)     Elevated Liver Enzymes    Toradol [ketorolac]         Objective:  Vitals:    23 1355   BP: (!) 99/46   Pulse: 70   Resp: 18   Weight: 60.3 kg (133 lb)   Height: 4' 11" (1.499 m)   PainSc:   4         Physical Exam  Vitals and nursing note reviewed. Exam conducted with a chaperone present.   Constitutional:       General: She is awake. She is not in acute distress.     Appearance: Normal appearance. She is not ill-appearing or diaphoretic.   HENT:      Head: Normocephalic and atraumatic.      Nose: Nose normal.      Mouth/Throat:      Mouth: Mucous membranes are moist.      Pharynx: Oropharynx is clear.   Eyes:      Conjunctiva/sclera: Conjunctivae normal.      Pupils: Pupils are equal, round, and reactive to light.   Cardiovascular:      Rate and Rhythm: Normal rate.   Pulmonary:      Effort: Pulmonary effort is normal. No respiratory distress.   Abdominal:      Palpations: Abdomen is soft.      Tenderness: There is no guarding.   Musculoskeletal:         General: Normal range of motion.      Cervical back: Normal range of motion and neck " supple. No rigidity.   Skin:     General: Skin is warm and dry.      Coloration: Skin is not jaundiced or pale.   Neurological:      General: No focal deficit present.      Mental Status: She is alert and oriented to person, place, and time. Mental status is at baseline.      Cranial Nerves: No cranial nerve deficit (II-XII).   Psychiatric:         Mood and Affect: Mood normal.         Behavior: Behavior normal. Behavior is cooperative.         Thought Content: Thought content normal.           US Abdomen Limited  Narrative: EXAMINATION:  Abdominal ultrasound limited to the right upper quadrant    CLINICAL HISTORY:  .  Abnormal results of liver function studies    COMPARISON:  Multiple studies available, including the December 28, 2022 right upper quadrant ultrasound    TECHNIQUE:  Real-time ultrasound images are captured and archived.  Targeted ultrasound of the right upper quadrant was performed.    FINDINGS:  Liver is normal in size at 14.6 cm length.  There is no focal hepatic mass.  There is hepatopedal flow in the portal vein.    There is intrahepatic biliary dilatation as on the comparison exam.  Common bile duct measures ectatic at 14 mm as compared to 10 mm on the previous study.    Pancreas appears normal.    Right kidney measures 10.4 cm length.  Renal parenchyma is isoechoic to minimally hyperechoic to the hepatic parenchyma as can be seen with mild infiltrating and/or fibrosing medical renal parenchymal disease.  There is no focal renal mass.  There is no hydronephrosis.  Impression: There is biliary ectasia as on the previous study.  This is presumably related to patient age and prior cholecystectomy status.  If there is clinical concern for biliary obstruction, consider further evaluation with MRCP.    Evidence of mild medical renal parenchymal disease    Prior cholecystectomy    Electronically signed by: Justus Castaneda  Date:    09/13/2023  Time:    10:23       Lab Visit on 09/13/2023   Component  Date Value Ref Range Status    Sodium 09/13/2023 132 (L)  136 - 145 mmol/L Final    Potassium 09/13/2023 4.6  3.5 - 5.1 mmol/L Final    Chloride 09/13/2023 103  98 - 107 mmol/L Final    CO2 09/13/2023 23  21 - 32 mmol/L Final    Anion Gap 09/13/2023 11  7 - 16 mmol/L Final    Glucose 09/13/2023 307 (H)  74 - 106 mg/dL Final    BUN 09/13/2023 11  7 - 18 mg/dL Final    Creatinine 09/13/2023 0.94  0.55 - 1.02 mg/dL Final    BUN/Creatinine Ratio 09/13/2023 12  6 - 20 Final    Calcium 09/13/2023 8.8  8.5 - 10.1 mg/dL Final    Total Protein 09/13/2023 7.0  6.4 - 8.2 g/dL Final    Albumin 09/13/2023 3.2 (L)  3.5 - 5.0 g/dL Final    Globulin 09/13/2023 3.8  2.0 - 4.0 g/dL Final    A/G Ratio 09/13/2023 0.8   Final    Bilirubin, Total 09/13/2023 0.3  >0.0 - 1.2 mg/dL Final    Alk Phos 09/13/2023 82  55 - 142 U/L Final    ALT 09/13/2023 18  13 - 56 U/L Final    AST 09/13/2023 14 (L)  15 - 37 U/L Final    eGFR 09/13/2023 66  >=60 mL/min/1.73m2 Final    Iron 09/13/2023 64  50 - 170 µg/dL Final    Iron Saturation 09/13/2023 18  14 - 50 % Final    TIBC 09/13/2023 346  250 - 450 µg/dL Final    Ferritin 09/13/2023 90  8 - 252 ng/mL Final    WBC 09/13/2023 7.81  4.50 - 11.00 K/uL Final    RBC 09/13/2023 3.02 (L)  4.20 - 5.40 M/uL Final    Hemoglobin 09/13/2023 9.9 (L)  12.0 - 16.0 g/dL Final    Hematocrit 09/13/2023 32.2 (L)  38.0 - 47.0 % Final    MCV 09/13/2023 106.6 (H)  80.0 - 96.0 fL Final    MCH 09/13/2023 32.8 (H)  27.0 - 31.0 pg Final    MCHC 09/13/2023 30.7 (L)  32.0 - 36.0 g/dL Final    RDW 09/13/2023 13.2  11.5 - 14.5 % Final    Platelet Count 09/13/2023 225  150 - 400 K/uL Final    MPV 09/13/2023 10.5  9.4 - 12.4 fL Final    Neutrophils % 09/13/2023 77.9 (H)  53.0 - 65.0 % Final    Lymphocytes % 09/13/2023 13.7 (L)  27.0 - 41.0 % Final    Monocytes % 09/13/2023 4.6  2.0 - 6.0 % Final    Eosinophils % 09/13/2023 2.7  1.0 - 4.0 % Final    Basophils % 09/13/2023 0.6  0.0 - 1.0 % Final    Immature Granulocytes % 09/13/2023  0.5 (H)  0.0 - 0.4 % Final    nRBC, Auto 09/13/2023 0.0  <=0.0 % Final    Neutrophils, Abs 09/13/2023 6.08  1.80 - 7.70 K/uL Final    Lymphocytes, Absolute 09/13/2023 1.07  1.00 - 4.80 K/uL Final    Monocytes, Absolute 09/13/2023 0.36  0.00 - 0.80 K/uL Final    Eosinophils, Absolute 09/13/2023 0.21  0.00 - 0.50 K/uL Final    Basophils, Absolute 09/13/2023 0.05  0.00 - 0.20 K/uL Final    Immature Granulocytes, Absolute 09/13/2023 0.04  0.00 - 0.04 K/uL Final    nRBC, Absolute 09/13/2023 0.00  <=0.00 x10e3/uL Final    Diff Type 09/13/2023 Scan Smear   Final    Platelet Morphology 09/13/2023 Normal  Normal Final    Macrocytosis 09/13/2023 1+   Final    Vitamin B12 09/13/2023 505  193 - 986 pg/mL Final    Folate 09/13/2023 >20.0 (H)  3.1 - 17.5 ng/mL Final   Lab Requisition on 08/30/2023   Component Date Value Ref Range Status    Color, UA 08/30/2023 Colorless  Colorless, Straw, Yellow, Light Yellow, Dark Yellow Final    Clarity, UA 08/30/2023 Clear  Clear Final    pH, UA 08/30/2023 7.5  5.0 to 8.0 pH Units Final    Leukocytes, UA 08/30/2023 Moderate (A)  Negative Final    Nitrites, UA 08/30/2023 Negative  Negative Final    Protein, UA 08/30/2023 Negative  Negative Final    Glucose, UA 08/30/2023 Normal  Normal mg/dL Final    Ketones, UA 08/30/2023 Negative  Negative mg/dL Final    Urobilinogen, UA 08/30/2023 Normal  0.2, 1.0, Normal mg/dL Final    Bilirubin, UA 08/30/2023 Negative  Negative Final    Blood, UA 08/30/2023 Negative  Negative Final    Specific Gravity, UA 08/30/2023 1.005  <=1.030 Final    Culture, Urine 08/30/2023 >100,000 Escherichia coli (A)   Final    WBC, UA 08/30/2023 55 (H)  <=5 /hpf Final    RBC, UA 08/30/2023 2  <=3 /hpf Final    Bacteria, UA 08/30/2023 Few (A)  None Seen /hpf Final   Office Visit on 08/24/2023   Component Date Value Ref Range Status    POC Glucose 08/24/2023 228 (A)  70 - 110 MG/DL Final   Lab Visit on 08/24/2023   Component Date Value Ref Range Status    Calcium 08/24/2023 9.0   8.5 - 10.1 mg/dL Final   Office Visit on 08/07/2023   Component Date Value Ref Range Status    POC Amphetamines 08/07/2023 Negative  Negative, Inconclusive Final    POC Barbiturates 08/07/2023 Negative  Negative, Inconclusive Final    POC Benzodiazepines 08/07/2023 Presumptive Positive (A)  Negative, Inconclusive Final    POC Cocaine 08/07/2023 Negative  Negative, Inconclusive Final    POC THC 08/07/2023 Negative  Negative, Inconclusive Final    POC Methadone 08/07/2023 Negative  Negative, Inconclusive Final    POC Methamphetamine 08/07/2023 Negative  Negative, Inconclusive Final    POC Opiates 08/07/2023 Presumptive Positive (A)  Negative, Inconclusive Final    POC Oxycodone 08/07/2023 Presumptive Positive (A)  Negative, Inconclusive Final    POC Phencyclidine 08/07/2023 Negative  Negative, Inconclusive Final    POC Methylenedioxymethamphetamine * 08/07/2023 Negative  Negative, Inconclusive Final    POC Tricyclic Antidepressants 08/07/2023 Negative  Negative, Inconclusive Final    POC Buprenorphine 08/07/2023 Negative   Final     Acceptable 08/07/2023 Yes   Final    POC Temperature (Urine) 08/07/2023 90   Final    pH, UA 08/07/2023 6.0  5.0 to 8.0 pH Units Final    Creatinine, Urine 08/07/2023 140  28 - 219 mg/dL Final    6-Acetylmorphine 08/07/2023    Final    7-Aminoclonazepam 08/07/2023 Negative  Negative 25 ng/mL Final    a-Hydroxyalprazolam 08/07/2023 Negative  Negative 25 ng/mL Final    Acetyl Fentanyl 08/07/2023 Negative  Negative 2.5 ng/mL Final    Acetyl Norfentanyl Oxalate 08/07/2023 Negative  Negative 5 ng/mL Final    Benzoylecgonine 08/07/2023 Negative  Negative 100 ng/mL Final    Buprenorphine 08/07/2023 Negative  25 ng/mL Final    Codeine 08/07/2023 Negative  Negative 25 ng/mL Final    EDDP 08/07/2023 Negative  Negative 25 ng/mL Final    Fentanyl 08/07/2023 Negative  Negative 2.5 ng/mL Final    Hydrocodone 08/07/2023 Negative  Negative 25 ng/mL Final    Hydromorphone 08/07/2023 Negative   Negative 25 ng/mL Final    Lorazepam 08/07/2023 Negative  Negative 25 ng/mL Final    Morphine 08/07/2023 Negative  Negative 25 ng/mL Final    Norbuprenorphine 08/07/2023 Negative  Negative 25 ng/mL Final    Nordiazepam 08/07/2023 Negative  Negative 25 ng/mL Final    Norfentanyl Oxalate 08/07/2023 Negative  Negative 5 ng/mL Final    Norhydrocodone 08/07/2023 Negative  Negative 50 ng/mL Final    Noroxycodone HCL 08/07/2023 >500.0 (H)  <50.0 50 ng/mL Final    Oxazepam 08/07/2023 Negative  Negative 25 ng/mL Final    Oxycodone 08/07/2023 >250.0 (H)  <25.0 25 ng/mL Final    Oxymorphone 08/07/2023 >250.0 (H)  <25.0 25 ng/mL Final    Tapentadol 08/07/2023 Negative  Negative 25 ng/mL Final    Temazepam 08/07/2023 Negative  Negative 25 ng/mL Final    THC-COOH 08/07/2023 Negative  Negative 25 ng/mL Final    Tramadol 08/07/2023 Negative  Negative 100 ng/mL Final    Amphetamine, Urine 08/07/2023 Negative  Negative Final    Methamphetamines, Urine 08/07/2023 Negative  Negative Final    Methadone, Urine 08/07/2023 Negative  Negative 25 ng/mL Final    Specific Gravity, UA 08/07/2023 1.019  <=1.030 Final   Office Visit on 07/17/2023   Component Date Value Ref Range Status    Creatinine, Urine 07/17/2023 117  28 - 219 mg/dL Final    Microalbumin 07/17/2023 12.8 (H)  0.0 - 2.8 mg/dL Final    Microalbumin/Creatinine Ratio 07/17/2023 109.4 (H)  0.0 - 30.0 mg/g Final    Color, UA 07/17/2023 Yellow  Colorless, Straw, Yellow, Light Yellow, Dark Yellow Final    Clarity, UA 07/17/2023 Ex.Turbid  Clear Final    pH, UA 07/17/2023 5.5  5.0 to 8.0 pH Units Final    Leukocytes, UA 07/17/2023 Large (A)  Negative Final    Nitrites, UA 07/17/2023 Negative  Negative Final    Protein, UA 07/17/2023 50 (A)  Negative Final    Glucose, UA 07/17/2023 Normal  Normal mg/dL Final    Ketones, UA 07/17/2023 Negative  Negative mg/dL Final    Urobilinogen, UA 07/17/2023 Normal  0.2, 1.0, Normal mg/dL Final    Bilirubin, UA 07/17/2023 Negative  Negative Final     Blood, UA 07/17/2023 Trace (A)  Negative Final    Specific Gravity, UA 07/17/2023 1.014  <=1.030 Final    POC Amphetamines 07/17/2023 Negative  Negative, Inconclusive Final    POC Barbiturates 07/17/2023 Negative  Negative, Inconclusive Final    POC Benzodiazepines 07/17/2023 Negative  Negative, Inconclusive Final    POC Cocaine 07/17/2023 Negative  Negative, Inconclusive Final    POC THC 07/17/2023 Negative  Negative, Inconclusive Final    POC Methadone 07/17/2023 Negative  Negative, Inconclusive Final    POC Methamphetamine 07/17/2023 Negative  Negative, Inconclusive Final    POC Opiates 07/17/2023 Presumptive Positive (A)  Negative, Inconclusive Final    POC Oxycodone 07/17/2023 Presumptive Positive (A)  Negative, Inconclusive Final    POC Phencyclidine 07/17/2023 Negative  Negative, Inconclusive Final    POC Methylenedioxymethamphetamine * 07/17/2023 Negative  Negative, Inconclusive Final    POC Tricyclic Antidepressants 07/17/2023    Final    POC Buprenorphine 07/17/2023 Negative   Final     Acceptable 07/17/2023 Yes   Final    POC Temperature (Urine) 07/17/2023 92   Final    TSH 07/17/2023 22.500 (H)  0.358 - 3.740 uIU/mL Final    Hemoglobin A1C 07/17/2023 7.2 (H)  4.5 - 6.6 % Final    Estimated Average Glucose 07/17/2023 154  mg/dL Final    Sodium 07/17/2023 138  136 - 145 mmol/L Final    Potassium 07/17/2023 3.8  3.5 - 5.1 mmol/L Final    Chloride 07/17/2023 108 (H)  98 - 107 mmol/L Final    CO2 07/17/2023 23  21 - 32 mmol/L Final    Anion Gap 07/17/2023 11  7 - 16 mmol/L Final    Glucose 07/17/2023 103  74 - 106 mg/dL Final    BUN 07/17/2023 8  7 - 18 mg/dL Final    Creatinine 07/17/2023 0.93  0.55 - 1.02 mg/dL Final    BUN/Creatinine Ratio 07/17/2023 9  6 - 20 Final    Calcium 07/17/2023 8.8  8.5 - 10.1 mg/dL Final    Total Protein 07/17/2023 7.2  6.4 - 8.2 g/dL Final    Albumin 07/17/2023 3.1 (L)  3.5 - 5.0 g/dL Final    Globulin 07/17/2023 4.1 (H)  2.0 - 4.0 g/dL Final    A/G Ratio  07/17/2023 0.8   Final    Bilirubin, Total 07/17/2023 0.2  >0.0 - 1.2 mg/dL Final    Alk Phos 07/17/2023 179 (H)  55 - 142 U/L Final    ALT 07/17/2023 39  13 - 56 U/L Final    AST 07/17/2023 21  15 - 37 U/L Final    eGFR 07/17/2023 67  >=60 mL/min/1.73m2 Final    Free T4 07/17/2023 1.29  0.76 - 1.46 ng/dL Final    WBC 07/17/2023 7.68  4.50 - 11.00 K/uL Final    RBC 07/17/2023 3.45 (L)  4.20 - 5.40 M/uL Final    Hemoglobin 07/17/2023 10.6 (L)  12.0 - 16.0 g/dL Final    Hematocrit 07/17/2023 33.6 (L)  38.0 - 47.0 % Final    MCV 07/17/2023 97.4 (H)  80.0 - 96.0 fL Final    MCH 07/17/2023 30.7  27.0 - 31.0 pg Final    MCHC 07/17/2023 31.5 (L)  32.0 - 36.0 g/dL Final    RDW 07/17/2023 14.1  11.5 - 14.5 % Final    Platelet Count 07/17/2023 325  150 - 400 K/uL Final    MPV 07/17/2023 11.0  9.4 - 12.4 fL Final    Neutrophils % 07/17/2023 62.2  53.0 - 65.0 % Final    Lymphocytes % 07/17/2023 25.1 (L)  27.0 - 41.0 % Final    Monocytes % 07/17/2023 8.3 (H)  2.0 - 6.0 % Final    Eosinophils % 07/17/2023 3.5  1.0 - 4.0 % Final    Basophils % 07/17/2023 0.4  0.0 - 1.0 % Final    Immature Granulocytes % 07/17/2023 0.5 (H)  0.0 - 0.4 % Final    nRBC, Auto 07/17/2023 0.0  <=0.0 % Final    Neutrophils, Abs 07/17/2023 4.77  1.80 - 7.70 K/uL Final    Lymphocytes, Absolute 07/17/2023 1.93  1.00 - 4.80 K/uL Final    Monocytes, Absolute 07/17/2023 0.64  0.00 - 0.80 K/uL Final    Eosinophils, Absolute 07/17/2023 0.27  0.00 - 0.50 K/uL Final    Basophils, Absolute 07/17/2023 0.03  0.00 - 0.20 K/uL Final    Immature Granulocytes, Absolute 07/17/2023 0.04  0.00 - 0.04 K/uL Final    nRBC, Absolute 07/17/2023 0.00  <=0.00 x10e3/uL Final    Diff Type 07/17/2023 Auto   Final    WBC, UA 07/17/2023 >182 (H)  <=5 /hpf Final    RBC, UA 07/17/2023 22 (H)  <=3 /hpf Final    Bacteria, UA 07/17/2023 Many (A)  None Seen /hpf Final    WBC Clumps 07/17/2023 Many (A)  None Seen /hpf Final    Mucous 07/17/2023 Occasional (A)  None Seen /LPF Final    Culture,  Urine 07/17/2023 >100,000 Escherichia coli (A)   Final   Patient Outreach on 06/12/2023   Component Date Value Ref Range Status    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/25/2023 Positive   Final    Right Eye DM Retinopathy 04/25/2023 Positive   Final   Office Visit on 05/25/2023   Component Date Value Ref Range Status    Candida Species 05/25/2023 Negative  Negative, Invalid Final    Gardnerella 05/25/2023 Positive (A)  Negative, Invalid Final    Trichomonas 05/25/2023 Negative  Negative, Invalid Final   Office Visit on 03/29/2023   Component Date Value Ref Range Status    POC Amphetamines 03/29/2023 Negative  Negative, Inconclusive Final    POC Barbiturates 03/29/2023 Negative  Negative, Inconclusive Final    POC Benzodiazepines 03/29/2023 Negative  Negative, Inconclusive Final    POC Cocaine 03/29/2023 Negative  Negative, Inconclusive Final    POC THC 03/29/2023 Negative  Negative, Inconclusive Final    POC Methadone 03/29/2023 Negative  Negative, Inconclusive Final    POC Methamphetamine 03/29/2023 Negative  Negative, Inconclusive Final    POC Opiates 03/29/2023 Presumptive Positive (A)  Negative, Inconclusive Final    POC Oxycodone 03/29/2023 Presumptive Positive (A)  Negative, Inconclusive Final    POC Phencyclidine 03/29/2023 Negative  Negative, Inconclusive Final    POC Methylenedioxymethamphetamine * 03/29/2023 Negative  Negative, Inconclusive Final    POC Tricyclic Antidepressants 03/29/2023    Final    POC Buprenorphine 03/29/2023 Negative   Final     Acceptable 03/29/2023 Yes   Final    POC Temperature (Urine) 03/29/2023 90   Final    Hemoglobin A1C 03/29/2023 7.7 (H)  4.5 - 6.6 % Final    Estimated Average Glucose 03/29/2023 170  mg/dL Final    Sodium 03/29/2023 139  136 - 145 mmol/L Final    Potassium 03/29/2023 4.1  3.5 - 5.1 mmol/L Final    Chloride 03/29/2023 105  98 - 107 mmol/L Final    CO2 03/29/2023 30  21 - 32 mmol/L Final    Anion Gap 03/29/2023 8  7 - 16 mmol/L Final    Glucose 03/29/2023  149 (H)  74 - 106 mg/dL Final    BUN 03/29/2023 10  7 - 18 mg/dL Final    Creatinine 03/29/2023 0.94  0.55 - 1.02 mg/dL Final    BUN/Creatinine Ratio 03/29/2023 11  6 - 20 Final    Calcium 03/29/2023 9.3  8.5 - 10.1 mg/dL Final    Total Protein 03/29/2023 7.1  6.4 - 8.2 g/dL Final    Albumin 03/29/2023 3.3 (L)  3.5 - 5.0 g/dL Final    Globulin 03/29/2023 3.8  2.0 - 4.0 g/dL Final    A/G Ratio 03/29/2023 0.9   Final    Bilirubin, Total 03/29/2023 0.1  >0.0 - 1.2 mg/dL Final    Alk Phos 03/29/2023 102  55 - 142 U/L Final    ALT 03/29/2023 22  13 - 56 U/L Final    AST 03/29/2023 26  15 - 37 U/L Final    eGFR 03/29/2023 66  >=60 mL/min/1.73m² Final    Free T4 03/29/2023 1.09  0.76 - 1.46 ng/dL Final    TSH 03/29/2023 3.660  0.358 - 3.740 uIU/mL Final    WBC 03/29/2023 8.87  4.50 - 11.00 K/uL Final    RBC 03/29/2023 3.75 (L)  4.20 - 5.40 M/uL Final    Hemoglobin 03/29/2023 11.5 (L)  12.0 - 16.0 g/dL Final    Hematocrit 03/29/2023 34.9 (L)  38.0 - 47.0 % Final    MCV 03/29/2023 93.1  80.0 - 96.0 fL Final    MCH 03/29/2023 30.7  27.0 - 31.0 pg Final    MCHC 03/29/2023 33.0  32.0 - 36.0 g/dL Final    RDW 03/29/2023 12.8  11.5 - 14.5 % Final    Platelet Count 03/29/2023 278  150 - 400 K/uL Final    MPV 03/29/2023 11.4  9.4 - 12.4 fL Final    Neutrophils % 03/29/2023 64.6  53.0 - 65.0 % Final    Lymphocytes % 03/29/2023 23.2 (L)  27.0 - 41.0 % Final    Monocytes % 03/29/2023 6.0  2.0 - 6.0 % Final    Eosinophils % 03/29/2023 5.0 (H)  1.0 - 4.0 % Final    Basophils % 03/29/2023 0.7  0.0 - 1.0 % Final    Immature Granulocytes % 03/29/2023 0.5 (H)  0.0 - 0.4 % Final    nRBC, Auto 03/29/2023 0.0  <=0.0 % Final    Neutrophils, Abs 03/29/2023 5.74  1.80 - 7.70 K/uL Final    Lymphocytes, Absolute 03/29/2023 2.06  1.00 - 4.80 K/uL Final    Monocytes, Absolute 03/29/2023 0.53  0.00 - 0.80 K/uL Final    Eosinophils, Absolute 03/29/2023 0.44  0.00 - 0.50 K/uL Final    Basophils, Absolute 03/29/2023 0.06  0.00 - 0.20 K/uL Final     Immature Granulocytes, Absolute 03/29/2023 0.04  0.00 - 0.04 K/uL Final    nRBC, Absolute 03/29/2023 0.00  <=0.00 x10e3/uL Final    Diff Type 03/29/2023 Auto   Final         No orders of the defined types were placed in this encounter.      Requested Prescriptions     Pending Prescriptions Disp Refills    HYDROcodone-acetaminophen (NORCO)  mg per tablet 120 tablet 0     Sig: Take 1 tablet by mouth every 6 (six) hours as needed for Pain.       Assessment:     1. Lumbosacral radiculopathy    2. Thoracic spondylosis         A's of Opioid Risk Assessment  Activity:Patient can perform ADL.   Analgesia:Patients pain is partially controlled by current medication. Patient has tried OTC medications such as Tylenol and Ibuprofen with out relief.   Adverse Effects: Patient denies constipation or sedation.  Aberrant Behavior:  reviewed with no aberrant drug seeking/taking behavior.  Overdose reversal drug naloxone discussed    Drug screen reviewed        MRI thoracic spine [facility] August 17, 2023 multiple level degenerative changes    Definitive drug screen August 7, 2023 consistent with oxycodone and metabolites    X-ray lumbar spine [facility] August 7, 2023 multiple level degenerative changes    X-ray thoracic spine [facility] August 7, 2023 multiple level degenerative changes apex right Curvature about 10° similar to prior    X-ray cervical spine [facility] August 7, 2023 multiple level degenerative changes        Plan:    History sternotomy 20+ years ago chronic thoracic pain since that time    Lumbar surgery 20 years ago    Back pain leg pain radicular in nature she states current medications helping her discomfort     She would like to continue conservative management this time    Continue home exercise program as directed    Patient is severely debilitated uses wheelchair for mobility    Patient does not drive    Follow-up 2 months, drug screen    Dr. Nicholas, August 2024    Bring original  prescription medication bottles/container/box with labels to each visit

## 2023-09-13 NOTE — TELEPHONE ENCOUNTER
Results called to patient. Verbalized understanding. B12 added.            ----- Message from SERGEI Davis sent at 9/13/2023 12:50 PM CDT -----  Anemia has increased slightly. MCV elevated. Add B 12

## 2023-09-14 ENCOUNTER — TELEPHONE (OUTPATIENT)
Dept: GASTROENTEROLOGY | Facility: CLINIC | Age: 68
End: 2023-09-14
Payer: MEDICARE

## 2023-09-14 ENCOUNTER — OFFICE VISIT (OUTPATIENT)
Dept: PAIN MEDICINE | Facility: CLINIC | Age: 68
End: 2023-09-14
Payer: MEDICARE

## 2023-09-14 VITALS
HEIGHT: 59 IN | SYSTOLIC BLOOD PRESSURE: 99 MMHG | WEIGHT: 133 LBS | RESPIRATION RATE: 18 BRPM | HEART RATE: 70 BPM | BODY MASS INDEX: 26.81 KG/M2 | DIASTOLIC BLOOD PRESSURE: 46 MMHG

## 2023-09-14 DIAGNOSIS — M47.814 THORACIC SPONDYLOSIS: Chronic | ICD-10-CM

## 2023-09-14 DIAGNOSIS — R71.8 ELEVATED MCV: ICD-10-CM

## 2023-09-14 DIAGNOSIS — D53.1 OTHER MEGALOBLASTIC ANEMIAS, NOT ELSEWHERE CLASSIFIED: ICD-10-CM

## 2023-09-14 DIAGNOSIS — M54.17 LUMBOSACRAL RADICULOPATHY: Primary | Chronic | ICD-10-CM

## 2023-09-14 DIAGNOSIS — R71.8 ELEVATED MCV: Primary | ICD-10-CM

## 2023-09-14 PROCEDURE — 99215 OFFICE O/P EST HI 40 MIN: CPT | Mod: PBBFAC | Performed by: PHYSICIAN ASSISTANT

## 2023-09-14 PROCEDURE — 99214 OFFICE O/P EST MOD 30 MIN: CPT | Mod: S$PBB,,, | Performed by: PHYSICIAN ASSISTANT

## 2023-09-14 PROCEDURE — 99214 PR OFFICE/OUTPT VISIT, EST, LEVL IV, 30-39 MIN: ICD-10-PCS | Mod: S$PBB,,, | Performed by: PHYSICIAN ASSISTANT

## 2023-09-14 RX ORDER — HYDROCODONE BITARTRATE AND ACETAMINOPHEN 10; 325 MG/1; MG/1
1 TABLET ORAL EVERY 6 HOURS PRN
Qty: 120 TABLET | Refills: 0 | Status: SHIPPED | OUTPATIENT
Start: 2023-10-16 | End: 2023-09-26 | Stop reason: SDUPTHER

## 2023-09-14 RX ORDER — HYDROCODONE BITARTRATE AND ACETAMINOPHEN 10; 325 MG/1; MG/1
1 TABLET ORAL EVERY 6 HOURS PRN
Qty: 120 TABLET | Refills: 0 | Status: SHIPPED | OUTPATIENT
Start: 2023-09-16 | End: 2023-11-13 | Stop reason: SDUPTHER

## 2023-09-14 NOTE — TELEPHONE ENCOUNTER
Attempted to call results. Left message.              ----- Message from SERGEI Davis sent at 9/13/2023  4:17 PM CDT -----  Mild biliary dilation, probably a result of prior cholecystectomy

## 2023-09-14 NOTE — TELEPHONE ENCOUNTER
Attempted to call results. Left message.          ----- Message from SERGEI Davis sent at 9/14/2023  7:31 AM CDT -----  Anemia with normal B 12. Needs SPEP and B 6.

## 2023-09-14 NOTE — TELEPHONE ENCOUNTER
Attempted to call results. No answer or voicemail available.             ----- Message from SERGEI Davis sent at 9/13/2023  4:17 PM CDT -----  Mild biliary dilation, probably a result of prior cholecystectomy

## 2023-09-18 ENCOUNTER — TELEPHONE (OUTPATIENT)
Dept: GASTROENTEROLOGY | Facility: CLINIC | Age: 68
End: 2023-09-18
Payer: MEDICARE

## 2023-09-20 NOTE — PROGRESS NOTES
"Subjective     Patient ID: Nelida Nicholas is a 68 y.o. female.    Chief Complaint: General Diabetes Follow-up (Here for routine follow up. Patient is using Tandem and Keely. )    Here today for reevaluation of pump settings and control. Awaiting change to omnipod.  Was sent to local pharmacy.  Advised to be in touch with pharmacy to see when they can . Needs to return for fasting labs.    Keely download performed and discussed at the bedside.  Tandem pump setting reviewed and changed as follows.    Keely download was performed and reviewed at the bedside.  Very high 31 %  High27%  [id number]%  Low3%  Very low2%    Average 206   estimated a1c 8.2%     Tandem Pump download reveals:    Basal rate  8029-8532=0.55  8346-1366=0.95  3819-9271=0.75    Carb ratio  0076-1562 1:10  4334-8346 1:12    Correction factor  2315-5175 1:40         Lab Results       Component                Value               Date                       HGBA1C                   7.2 (H)             07/17/2023            Lab Results       Component                Value               Date                       MICROALBUR               12.8 (H)            07/17/2023            Lab Results       Component                Value               Date                       CHOL                     191                 10/20/2022                 CHOL                     209 (H)             10/08/2021            Lab Results       Component                Value               Date                       HDL                      46                  10/20/2022                 HDL                      41                  10/08/2021            Lab Results       Component                Value               Date                       LDLCALC                  117                 10/20/2022                 LDLCALC                  147                 10/08/2021            No results found for: "DLDL"  Lab Results       Component                Value               Date    "                    TRIG                     140                 10/20/2022                 TRIG                     105                 10/08/2021              f1 Lab Results       Component                Value               Date                       CHOLHDL                  4.2                 10/20/2022                 CHOLHDL                  5.1                 10/08/2021            CMP  Sodium       Date                     Value               Ref Range           Status                09/13/2023               132 (L)             136 - 145 mmol*     Final            ----------  Potassium       Date                     Value               Ref Range           Status                09/13/2023               4.6                 3.5 - 5.1 mmol*     Final            ----------  Chloride       Date                     Value               Ref Range           Status                09/13/2023               103                 98 - 107 mmol/L     Final            ----------  CO2       Date                     Value               Ref Range           Status                09/13/2023               23                  21 - 32 mmol/L      Final            ----------  Glucose       Date                     Value               Ref Range           Status                09/13/2023               307 (H)             74 - 106 mg/dL      Final            ----------  BUN       Date                     Value               Ref Range           Status                09/13/2023               11                  7 - 18 mg/dL        Final            ----------  Creatinine       Date                     Value               Ref Range           Status                09/13/2023               0.94                0.55 - 1.02 mg*     Final            ----------  Calcium       Date                     Value               Ref Range           Status                09/13/2023               8.8                 8.5 - 10.1 mg/*     Final             ----------  Total Protein       Date                     Value               Ref Range           Status                09/13/2023               7.0                 6.4 - 8.2 g/dL      Final            ----------  Albumin       Date                     Value               Ref Range           Status                09/13/2023               3.2 (L)             3.5 - 5.0 g/dL      Final            ----------  Bilirubin, Total       Date                     Value               Ref Range           Status                09/13/2023               0.3                 >0.0 - 1.2 mg/*     Final            ----------  Alk Phos       Date                     Value               Ref Range           Status                09/13/2023               82                  55 - 142 U/L        Final            ----------  AST       Date                     Value               Ref Range           Status                09/13/2023               14 (L)              15 - 37 U/L         Final            ----------  ALT       Date                     Value               Ref Range           Status                09/13/2023               18                  13 - 56 U/L         Final            ----------  Anion Gap       Date                     Value               Ref Range           Status                09/13/2023               11                  7 - 16 mmol/L       Final            ----------  eGFR       Date                     Value               Ref Range           Status                09/13/2023               66                  >=60 mL/min/1.*     Final            ----------      Review of Systems   Constitutional:  Negative for activity change, appetite change, diaphoresis and fatigue.   HENT:  Negative for nasal congestion, facial swelling and sinus pressure/congestion.    Eyes:  Negative for visual disturbance.   Respiratory:  Negative for shortness of breath and wheezing.    Cardiovascular:  Negative for chest pain and leg swelling.    Gastrointestinal:  Negative for constipation, diarrhea, nausea and vomiting.   Endocrine: Negative for polydipsia, polyphagia and polyuria.   Genitourinary:  Negative for dysuria, frequency and urgency.   Musculoskeletal:  Negative for gait problem and myalgias.   Integumentary:  Negative for color change, rash and wound.   Neurological:  Negative for dizziness, syncope, weakness, headaches and coordination difficulties.   Hematological:  Does not bruise/bleed easily.   Psychiatric/Behavioral:  Negative for self-injury, sleep disturbance and suicidal ideas. The patient is not nervous/anxious.           Objective     Physical Exam  Vitals and nursing note reviewed.   Constitutional:       Appearance: Normal appearance.   HENT:      Head: Normocephalic.   Neck:      Thyroid: No thyromegaly.      Vascular: No carotid bruit.   Cardiovascular:      Rate and Rhythm: Normal rate and regular rhythm.      Heart sounds: Normal heart sounds.   Pulmonary:      Effort: Pulmonary effort is normal.      Breath sounds: Normal breath sounds.   Musculoskeletal:         General: Normal range of motion.   Skin:     General: Skin is warm and dry.   Neurological:      General: No focal deficit present.      Mental Status: She is alert and oriented to person, place, and time.   Psychiatric:         Mood and Affect: Mood normal.         Behavior: Behavior normal.         Thought Content: Thought content normal.         Judgment: Judgment normal.          1. Type 1 diabetes mellitus with hypoglycemia and without coma  Call at any time for download or further evaluation if need be after todays changes  Call once you get omnipod supplies.  Education provided and discussed at length    -     POCT Glucose, Hand-Held Device  -     Ambulatory referral/consult to Diabetic Advanced Practice Providers (Medical Management)  -     insulin aspart U-100 (NOVOLOG) 100 unit/mL injection; Use to inject up to 100 units per insulin pump daily.  Dispense:  100 mL; Refill: 3  2. Hypertension  ARB coverage    3. Chronic pain syndrome      4. Acquired hypothyroidism  Take synthroid as directed    5. Hyperlipidemia, unspecified hyperlipidemia type  Has been on repatha in the past but new insurance will not cover it.  Statin intolerant.  Will start zetia.  And will retry repatha in 3 months     Other orders  -     ezetimibe (ZETIA) 10 mg tablet; Take 1 tablet (10 mg total) by mouth once daily.  Dispense: 90 tablet; Refill: 3

## 2023-09-26 ENCOUNTER — OFFICE VISIT (OUTPATIENT)
Dept: DIABETES SERVICES | Facility: CLINIC | Age: 68
End: 2023-09-26
Payer: MEDICARE

## 2023-09-26 ENCOUNTER — PATIENT MESSAGE (OUTPATIENT)
Dept: DIABETES SERVICES | Facility: CLINIC | Age: 68
End: 2023-09-26
Payer: MEDICARE

## 2023-09-26 VITALS
DIASTOLIC BLOOD PRESSURE: 62 MMHG | SYSTOLIC BLOOD PRESSURE: 143 MMHG | WEIGHT: 134 LBS | RESPIRATION RATE: 16 BRPM | BODY MASS INDEX: 27.01 KG/M2 | HEART RATE: 69 BPM | HEIGHT: 59 IN | OXYGEN SATURATION: 96 %

## 2023-09-26 DIAGNOSIS — E78.5 HYPERLIPIDEMIA, UNSPECIFIED HYPERLIPIDEMIA TYPE: ICD-10-CM

## 2023-09-26 DIAGNOSIS — I10 PRIMARY HYPERTENSION: ICD-10-CM

## 2023-09-26 DIAGNOSIS — E03.9 ACQUIRED HYPOTHYROIDISM: ICD-10-CM

## 2023-09-26 DIAGNOSIS — E10.649 TYPE 1 DIABETES MELLITUS WITH HYPOGLYCEMIA AND WITHOUT COMA: Primary | ICD-10-CM

## 2023-09-26 DIAGNOSIS — G89.4 CHRONIC PAIN SYNDROME: ICD-10-CM

## 2023-09-26 LAB — GLUCOSE SERPL-MCNC: 123 MG/DL (ref 70–110)

## 2023-09-26 PROCEDURE — 99999PBSHW POCT GLUCOSE, HAND-HELD DEVICE: Mod: PBBFAC,,,

## 2023-09-26 PROCEDURE — 95251 PR GLUCOSE MONITOR, 72 HOUR, PHYS INTERP: ICD-10-PCS | Mod: ,,, | Performed by: NURSE PRACTITIONER

## 2023-09-26 PROCEDURE — 99214 PR OFFICE/OUTPT VISIT, EST, LEVL IV, 30-39 MIN: ICD-10-PCS | Mod: S$PBB,,, | Performed by: NURSE PRACTITIONER

## 2023-09-26 PROCEDURE — 95251 CONT GLUC MNTR ANALYSIS I&R: CPT | Mod: ,,, | Performed by: NURSE PRACTITIONER

## 2023-09-26 PROCEDURE — 99999PBSHW POCT GLUCOSE, HAND-HELD DEVICE: ICD-10-PCS | Mod: PBBFAC,,,

## 2023-09-26 PROCEDURE — 82962 GLUCOSE BLOOD TEST: CPT | Mod: PBBFAC | Performed by: NURSE PRACTITIONER

## 2023-09-26 PROCEDURE — 99215 OFFICE O/P EST HI 40 MIN: CPT | Mod: PBBFAC | Performed by: NURSE PRACTITIONER

## 2023-09-26 PROCEDURE — 99214 OFFICE O/P EST MOD 30 MIN: CPT | Mod: S$PBB,,, | Performed by: NURSE PRACTITIONER

## 2023-09-26 NOTE — PATIENT INSTRUCTIONS
Check glucose before all meals and ensure to enter data in pump.       Check glucose also before bed and have a 15 carb snack that has protein 8-10grams preferably.     Bring you meter with you to all appointments.

## 2023-10-03 ENCOUNTER — DOCUMENT SCAN (OUTPATIENT)
Dept: HOME HEALTH SERVICES | Facility: HOSPITAL | Age: 68
End: 2023-10-03
Payer: MEDICARE

## 2023-10-09 DIAGNOSIS — Z71.89 COMPLEX CARE COORDINATION: ICD-10-CM

## 2023-10-16 PROBLEM — Z78.0 ENCOUNTER FOR OSTEOPOROSIS SCREENING IN ASYMPTOMATIC POSTMENOPAUSAL PATIENT: Status: RESOLVED | Noted: 2023-07-17 | Resolved: 2023-10-16

## 2023-10-16 PROBLEM — Z13.820 ENCOUNTER FOR OSTEOPOROSIS SCREENING IN ASYMPTOMATIC POSTMENOPAUSAL PATIENT: Status: RESOLVED | Noted: 2023-07-17 | Resolved: 2023-10-16

## 2023-10-24 ENCOUNTER — PATIENT MESSAGE (OUTPATIENT)
Dept: DIABETES SERVICES | Facility: CLINIC | Age: 68
End: 2023-10-24
Payer: MEDICARE

## 2023-10-25 ENCOUNTER — PATIENT MESSAGE (OUTPATIENT)
Dept: DIABETES SERVICES | Facility: CLINIC | Age: 68
End: 2023-10-25
Payer: MEDICARE

## 2023-10-26 ENCOUNTER — PATIENT MESSAGE (OUTPATIENT)
Dept: DIABETES SERVICES | Facility: CLINIC | Age: 68
End: 2023-10-26
Payer: MEDICARE

## 2023-10-26 RX ORDER — INSULIN PUMP CONTROLLER
1 EACH MISCELLANEOUS DAILY
Qty: 1 EACH | Refills: 0 | Status: SHIPPED | OUTPATIENT
Start: 2023-10-26

## 2023-10-26 RX ORDER — INSULIN PUMP CONTROLLER
1 EACH MISCELLANEOUS
Qty: 30 EACH | Refills: 3 | Status: SHIPPED | OUTPATIENT
Start: 2023-10-26

## 2023-10-31 ENCOUNTER — PATIENT MESSAGE (OUTPATIENT)
Dept: DIABETES SERVICES | Facility: CLINIC | Age: 68
End: 2023-10-31
Payer: MEDICARE

## 2023-11-03 ENCOUNTER — PATIENT MESSAGE (OUTPATIENT)
Dept: DIABETES SERVICES | Facility: CLINIC | Age: 68
End: 2023-11-03
Payer: MEDICARE

## 2023-11-06 NOTE — TELEPHONE ENCOUNTER
I called Amina, she stated that she may be in the donut hole so this may be true. She is going to call pt and speak to her

## 2023-11-13 NOTE — PROGRESS NOTES
Subjective:         Patient ID: Nelida Nicholas is a 68 y.o. female.    Chief Complaint: Back Pain      Pain  This is a chronic problem. The current episode started more than 1 year ago. The problem occurs daily. The problem has been unchanged. Associated symptoms include arthralgias. Pertinent negatives include no anorexia, change in bowel habit, chest pain, chills, coughing, diaphoresis, fever, neck pain, rash, sore throat, swollen glands, urinary symptoms, vertigo or vomiting.     Review of Systems   Constitutional:  Negative for activity change, appetite change, chills, diaphoresis, fever and unexpected weight change.   HENT:  Negative for drooling, ear discharge, ear pain, facial swelling, nosebleeds, sore throat, trouble swallowing, voice change and goiter.    Eyes:  Negative for photophobia, pain, discharge, redness and visual disturbance.   Respiratory:  Negative for apnea, cough, choking, chest tightness, shortness of breath, wheezing and stridor.    Cardiovascular:  Negative for chest pain, palpitations and leg swelling.   Gastrointestinal:  Negative for abdominal distention, anorexia, change in bowel habit, diarrhea, rectal pain, vomiting and fecal incontinence.   Endocrine: Negative for cold intolerance, heat intolerance, polydipsia, polyphagia and polyuria.   Genitourinary:  Negative for bladder incontinence, dysuria, flank pain, frequency and hot flashes.   Musculoskeletal:  Positive for arthralgias, back pain and leg pain. Negative for neck pain.   Integumentary:  Negative for color change, pallor and rash.   Allergic/Immunologic: Negative for immunocompromised state.   Neurological:  Negative for dizziness, vertigo, seizures, syncope, facial asymmetry, speech difficulty, light-headedness, memory loss and coordination difficulties.   Hematological:  Negative for adenopathy. Does not bruise/bleed easily.   Psychiatric/Behavioral:  Negative for agitation, behavioral problems, confusion, decreased  concentration, dysphoric mood, hallucinations, self-injury and suicidal ideas. The patient is not nervous/anxious and is not hyperactive.            Past Medical History:   Diagnosis Date    Allergic rhinitis     Back pain, thoracic     Brittle diabetes     Carotid bruit     right    Chronic anemia     Chronic major depressive disorder, recurrent episode     Chronic migraine w/o aura, not intractable, w/o stat migr     Chronic pain syndrome     Constipation     [place] or callus     Coronary arteriosclerosis     S/P CABBAGE 1997    Degenerative joint disease involving multiple joints     Diabetes mellitus type I     Elevated levels of transaminase & lactic acid dehydrogenase     Encounter for long-term (current) use of insulin     Encounter for long-term (current) use of other medications     Epigastric pain     Fatigue     GERD (gastroesophageal reflux disease)     Hypercalcemia     Hyperkalemia     Hyperlipidemia     Hypo-osmolar hyponatremia     Hypothyroidism     Lumbosacral spondylosis with myelopathy     Memory loss     Osteoporosis     Restless legs syndrome (RLS)     Scoliosis deformity of spine     Sleep disorder     Vitamin D deficiency      Past Surgical History:   Procedure Laterality Date    ARTERIAL DOPPLER  11/07/2017    ARTERIAL DOPPLER DUPLEX Bilateral 11/07/2017    LEGS    BREAST BIOPSY      BYPASS GRAFT N/A 1999    tripple bypass surgery    CAROTID DOPPLER  11/07/2017    COMPLETE    CARPAL TUNNEL RELEASE      CHOLECYSTECTOMY      CORONARY ARTERY BYPASS GRAFT  1997    3 VESSLE BYPASS    ECHOCARDIOGRAPHY  06/27/2012    with doppler color flow    ESOPHAGOGASTRODUODENOSCOPY  03/11/2011    DIL AND BX    FINGER SURGERY      Trigger finger release all 10 fingers    NECK SURGERY      REDUCTION OF BOTH BREASTS      SHOULDER SURGERY      TOTAL REDUCTION MAMMOPLASTY      TUBAL LIGATION       Social History     Socioeconomic History    Marital status:    Occupational History    Occupation: retired  "  Tobacco Use    Smoking status: Former     Current packs/day: 0.00     Types: Cigarettes     Start date:      Quit date:      Years since quittin.8    Smokeless tobacco: Never    Tobacco comments:     1 pack per week   Substance and Sexual Activity    Alcohol use: Never    Drug use: Never    Sexual activity: Not Currently     Family History   Problem Relation Age of Onset    Hypertension Mother     Heart disease Father     Hyperlipidemia Father     Hypertension Father     Breast cancer Maternal Aunt     Heart disease Maternal Grandmother     Heart disease Paternal Grandfather     Hypertension Brother      Review of patient's allergies indicates:   Allergen Reactions    Klonopin [clonazepam]     Nalbuphine     Opioids-meperidine and related     Statins-hmg-coa reductase inhibitors Other (See Comments)     Elevated Liver Enzymes    Toradol [ketorolac]         Objective:  Vitals:    23 1401   BP: (!) 155/53   Pulse: 70   Resp: 20   Weight: 62.1 kg (137 lb)   Height: 4' 11" (1.499 m)   PainSc:   8         Physical Exam  Vitals and nursing note reviewed. Exam conducted with a chaperone present.   Constitutional:       General: She is awake. She is not in acute distress.     Appearance: Normal appearance. She is not ill-appearing or diaphoretic.   HENT:      Head: Normocephalic and atraumatic.      Nose: Nose normal.      Mouth/Throat:      Mouth: Mucous membranes are moist.      Pharynx: Oropharynx is clear.   Eyes:      Conjunctiva/sclera: Conjunctivae normal.      Pupils: Pupils are equal, round, and reactive to light.   Cardiovascular:      Rate and Rhythm: Normal rate.   Pulmonary:      Effort: Pulmonary effort is normal. No respiratory distress.   Abdominal:      Palpations: Abdomen is soft.      Tenderness: There is no guarding.   Musculoskeletal:         General: Normal range of motion.      Cervical back: Normal range of motion and neck supple. No rigidity.   Skin:     General: Skin is warm " and dry.      Coloration: Skin is not jaundiced or pale.   Neurological:      General: No focal deficit present.      Mental Status: She is alert and oriented to person, place, and time. Mental status is at baseline.      Cranial Nerves: No cranial nerve deficit (II-XII).   Psychiatric:         Mood and Affect: Mood normal.         Behavior: Behavior normal. Behavior is cooperative.         Thought Content: Thought content normal.           US Abdomen Limited  Narrative: EXAMINATION:  Abdominal ultrasound limited to the right upper quadrant    CLINICAL HISTORY:  .  Abnormal results of liver function studies    COMPARISON:  Multiple studies available, including the December 28, 2022 right upper quadrant ultrasound    TECHNIQUE:  Real-time ultrasound images are captured and archived.  Targeted ultrasound of the right upper quadrant was performed.    FINDINGS:  Liver is normal in size at 14.6 cm length.  There is no focal hepatic mass.  There is hepatopedal flow in the portal vein.    There is intrahepatic biliary dilatation as on the comparison exam.  Common bile duct measures ectatic at 14 mm as compared to 10 mm on the previous study.    Pancreas appears normal.    Right kidney measures 10.4 cm length.  Renal parenchyma is isoechoic to minimally hyperechoic to the hepatic parenchyma as can be seen with mild infiltrating and/or fibrosing medical renal parenchymal disease.  There is no focal renal mass.  There is no hydronephrosis.  Impression: There is biliary ectasia as on the previous study.  This is presumably related to patient age and prior cholecystectomy status.  If there is clinical concern for biliary obstruction, consider further evaluation with MRCP.    Evidence of mild medical renal parenchymal disease    Prior cholecystectomy    Electronically signed by: Justus Castaneda  Date:    09/13/2023  Time:    10:23       Lab Visit on 09/29/2023   Component Date Value Ref Range Status    Triglycerides 09/29/2023  152 (H)  35 - 150 mg/dL Final    Cholesterol 09/29/2023 147  0 - 200 mg/dL Final    HDL Cholesterol 09/29/2023 43  40 - 60 mg/dL Final    Cholesterol/HDL Ratio (Risk Factor) 09/29/2023 3.4   Final    Non-HDL 09/29/2023 104  mg/dL Final    LDL Calculated 09/29/2023 74  mg/dL Final    LDL/HDL 09/29/2023 1.7   Final    VLDL 09/29/2023 30  mg/dL Final   Office Visit on 09/26/2023   Component Date Value Ref Range Status    POC Glucose 09/26/2023 123 (A)  70 - 110 MG/DL Final   Lab Visit on 09/13/2023   Component Date Value Ref Range Status    Sodium 09/13/2023 132 (L)  136 - 145 mmol/L Final    Potassium 09/13/2023 4.6  3.5 - 5.1 mmol/L Final    Chloride 09/13/2023 103  98 - 107 mmol/L Final    CO2 09/13/2023 23  21 - 32 mmol/L Final    Anion Gap 09/13/2023 11  7 - 16 mmol/L Final    Glucose 09/13/2023 307 (H)  74 - 106 mg/dL Final    BUN 09/13/2023 11  7 - 18 mg/dL Final    Creatinine 09/13/2023 0.94  0.55 - 1.02 mg/dL Final    BUN/Creatinine Ratio 09/13/2023 12  6 - 20 Final    Calcium 09/13/2023 8.8  8.5 - 10.1 mg/dL Final    Total Protein 09/13/2023 7.0  6.4 - 8.2 g/dL Final    Albumin 09/13/2023 3.2 (L)  3.5 - 5.0 g/dL Final    Globulin 09/13/2023 3.8  2.0 - 4.0 g/dL Final    A/G Ratio 09/13/2023 0.8   Final    Bilirubin, Total 09/13/2023 0.3  >0.0 - 1.2 mg/dL Final    Alk Phos 09/13/2023 82  55 - 142 U/L Final    ALT 09/13/2023 18  13 - 56 U/L Final    AST 09/13/2023 14 (L)  15 - 37 U/L Final    eGFR 09/13/2023 66  >=60 mL/min/1.73m2 Final    Iron 09/13/2023 64  50 - 170 µg/dL Final    Iron Saturation 09/13/2023 18  14 - 50 % Final    TIBC 09/13/2023 346  250 - 450 µg/dL Final    Ferritin 09/13/2023 90  8 - 252 ng/mL Final    WBC 09/13/2023 7.81  4.50 - 11.00 K/uL Final    RBC 09/13/2023 3.02 (L)  4.20 - 5.40 M/uL Final    Hemoglobin 09/13/2023 9.9 (L)  12.0 - 16.0 g/dL Final    Hematocrit 09/13/2023 32.2 (L)  38.0 - 47.0 % Final    MCV 09/13/2023 106.6 (H)  80.0 - 96.0 fL Final    MCH 09/13/2023 32.8 (H)  27.0 -  31.0 pg Final    MCHC 09/13/2023 30.7 (L)  32.0 - 36.0 g/dL Final    RDW 09/13/2023 13.2  11.5 - 14.5 % Final    Platelet Count 09/13/2023 225  150 - 400 K/uL Final    MPV 09/13/2023 10.5  9.4 - 12.4 fL Final    Neutrophils % 09/13/2023 77.9 (H)  53.0 - 65.0 % Final    Lymphocytes % 09/13/2023 13.7 (L)  27.0 - 41.0 % Final    Monocytes % 09/13/2023 4.6  2.0 - 6.0 % Final    Eosinophils % 09/13/2023 2.7  1.0 - 4.0 % Final    Basophils % 09/13/2023 0.6  0.0 - 1.0 % Final    Immature Granulocytes % 09/13/2023 0.5 (H)  0.0 - 0.4 % Final    nRBC, Auto 09/13/2023 0.0  <=0.0 % Final    Neutrophils, Abs 09/13/2023 6.08  1.80 - 7.70 K/uL Final    Lymphocytes, Absolute 09/13/2023 1.07  1.00 - 4.80 K/uL Final    Monocytes, Absolute 09/13/2023 0.36  0.00 - 0.80 K/uL Final    Eosinophils, Absolute 09/13/2023 0.21  0.00 - 0.50 K/uL Final    Basophils, Absolute 09/13/2023 0.05  0.00 - 0.20 K/uL Final    Immature Granulocytes, Absolute 09/13/2023 0.04  0.00 - 0.04 K/uL Final    nRBC, Absolute 09/13/2023 0.00  <=0.00 x10e3/uL Final    Diff Type 09/13/2023 Scan Smear   Final    Platelet Morphology 09/13/2023 Normal  Normal Final    Macrocytosis 09/13/2023 1+   Final    Vitamin B12 09/13/2023 505  193 - 986 pg/mL Final    Folate 09/13/2023 >20.0 (H)  3.1 - 17.5 ng/mL Final   Lab Requisition on 08/30/2023   Component Date Value Ref Range Status    Color, UA 08/30/2023 Colorless  Colorless, Straw, Yellow, Light Yellow, Dark Yellow Final    Clarity, UA 08/30/2023 Clear  Clear Final    pH, UA 08/30/2023 7.5  5.0 to 8.0 pH Units Final    Leukocytes, UA 08/30/2023 Moderate (A)  Negative Final    Nitrites, UA 08/30/2023 Negative  Negative Final    Protein, UA 08/30/2023 Negative  Negative Final    Glucose, UA 08/30/2023 Normal  Normal mg/dL Final    Ketones, UA 08/30/2023 Negative  Negative mg/dL Final    Urobilinogen, UA 08/30/2023 Normal  0.2, 1.0, Normal mg/dL Final    Bilirubin, UA 08/30/2023 Negative  Negative Final    Blood, UA  08/30/2023 Negative  Negative Final    Specific Gravity, UA 08/30/2023 1.005  <=1.030 Final    Culture, Urine 08/30/2023 >100,000 Escherichia coli (A)   Final    WBC, UA 08/30/2023 55 (H)  <=5 /hpf Final    RBC, UA 08/30/2023 2  <=3 /hpf Final    Bacteria, UA 08/30/2023 Few (A)  None Seen /hpf Final   Office Visit on 08/24/2023   Component Date Value Ref Range Status    POC Glucose 08/24/2023 228 (A)  70 - 110 MG/DL Final   Lab Visit on 08/24/2023   Component Date Value Ref Range Status    Calcium 08/24/2023 9.0  8.5 - 10.1 mg/dL Final   Office Visit on 08/07/2023   Component Date Value Ref Range Status    POC Amphetamines 08/07/2023 Negative  Negative, Inconclusive Final    POC Barbiturates 08/07/2023 Negative  Negative, Inconclusive Final    POC Benzodiazepines 08/07/2023 Presumptive Positive (A)  Negative, Inconclusive Final    POC Cocaine 08/07/2023 Negative  Negative, Inconclusive Final    POC THC 08/07/2023 Negative  Negative, Inconclusive Final    POC Methadone 08/07/2023 Negative  Negative, Inconclusive Final    POC Methamphetamine 08/07/2023 Negative  Negative, Inconclusive Final    POC Opiates 08/07/2023 Presumptive Positive (A)  Negative, Inconclusive Final    POC Oxycodone 08/07/2023 Presumptive Positive (A)  Negative, Inconclusive Final    POC Phencyclidine 08/07/2023 Negative  Negative, Inconclusive Final    POC Methylenedioxymethamphetamine * 08/07/2023 Negative  Negative, Inconclusive Final    POC Tricyclic Antidepressants 08/07/2023 Negative  Negative, Inconclusive Final    POC Buprenorphine 08/07/2023 Negative   Final     Acceptable 08/07/2023 Yes   Final    POC Temperature (Urine) 08/07/2023 90   Final    pH, UA 08/07/2023 6.0  5.0 to 8.0 pH Units Final    Creatinine, Urine 08/07/2023 140  28 - 219 mg/dL Final    6-Acetylmorphine 08/07/2023    Final    7-Aminoclonazepam 08/07/2023 Negative  Negative 25 ng/mL Final    a-Hydroxyalprazolam 08/07/2023 Negative  Negative 25 ng/mL Final     Acetyl Fentanyl 08/07/2023 Negative  Negative 2.5 ng/mL Final    Acetyl Norfentanyl Oxalate 08/07/2023 Negative  Negative 5 ng/mL Final    Benzoylecgonine 08/07/2023 Negative  Negative 100 ng/mL Final    Buprenorphine 08/07/2023 Negative  25 ng/mL Final    Codeine 08/07/2023 Negative  Negative 25 ng/mL Final    EDDP 08/07/2023 Negative  Negative 25 ng/mL Final    Fentanyl 08/07/2023 Negative  Negative 2.5 ng/mL Final    Hydrocodone 08/07/2023 Negative  Negative 25 ng/mL Final    Hydromorphone 08/07/2023 Negative  Negative 25 ng/mL Final    Lorazepam 08/07/2023 Negative  Negative 25 ng/mL Final    Morphine 08/07/2023 Negative  Negative 25 ng/mL Final    Norbuprenorphine 08/07/2023 Negative  Negative 25 ng/mL Final    Nordiazepam 08/07/2023 Negative  Negative 25 ng/mL Final    Norfentanyl Oxalate 08/07/2023 Negative  Negative 5 ng/mL Final    Norhydrocodone 08/07/2023 Negative  Negative 50 ng/mL Final    Noroxycodone HCL 08/07/2023 >500.0 (H)  <50.0 50 ng/mL Final    Oxazepam 08/07/2023 Negative  Negative 25 ng/mL Final    Oxycodone 08/07/2023 >250.0 (H)  <25.0 25 ng/mL Final    Oxymorphone 08/07/2023 >250.0 (H)  <25.0 25 ng/mL Final    Tapentadol 08/07/2023 Negative  Negative 25 ng/mL Final    Temazepam 08/07/2023 Negative  Negative 25 ng/mL Final    THC-COOH 08/07/2023 Negative  Negative 25 ng/mL Final    Tramadol 08/07/2023 Negative  Negative 100 ng/mL Final    Amphetamine, Urine 08/07/2023 Negative  Negative Final    Methamphetamines, Urine 08/07/2023 Negative  Negative Final    Methadone, Urine 08/07/2023 Negative  Negative 25 ng/mL Final    Specific Gravity, UA 08/07/2023 1.019  <=1.030 Final   Office Visit on 07/17/2023   Component Date Value Ref Range Status    Creatinine, Urine 07/17/2023 117  28 - 219 mg/dL Final    Microalbumin 07/17/2023 12.8 (H)  0.0 - 2.8 mg/dL Final    Microalbumin/Creatinine Ratio 07/17/2023 109.4 (H)  0.0 - 30.0 mg/g Final    Color, UA 07/17/2023 Yellow  Colorless, Straw, Yellow,  Light Yellow, Dark Yellow Final    Clarity, UA 07/17/2023 Ex.Turbid  Clear Final    pH, UA 07/17/2023 5.5  5.0 to 8.0 pH Units Final    Leukocytes, UA 07/17/2023 Large (A)  Negative Final    Nitrites, UA 07/17/2023 Negative  Negative Final    Protein, UA 07/17/2023 50 (A)  Negative Final    Glucose, UA 07/17/2023 Normal  Normal mg/dL Final    Ketones, UA 07/17/2023 Negative  Negative mg/dL Final    Urobilinogen, UA 07/17/2023 Normal  0.2, 1.0, Normal mg/dL Final    Bilirubin, UA 07/17/2023 Negative  Negative Final    Blood, UA 07/17/2023 Trace (A)  Negative Final    Specific Gravity, UA 07/17/2023 1.014  <=1.030 Final    POC Amphetamines 07/17/2023 Negative  Negative, Inconclusive Final    POC Barbiturates 07/17/2023 Negative  Negative, Inconclusive Final    POC Benzodiazepines 07/17/2023 Negative  Negative, Inconclusive Final    POC Cocaine 07/17/2023 Negative  Negative, Inconclusive Final    POC THC 07/17/2023 Negative  Negative, Inconclusive Final    POC Methadone 07/17/2023 Negative  Negative, Inconclusive Final    POC Methamphetamine 07/17/2023 Negative  Negative, Inconclusive Final    POC Opiates 07/17/2023 Presumptive Positive (A)  Negative, Inconclusive Final    POC Oxycodone 07/17/2023 Presumptive Positive (A)  Negative, Inconclusive Final    POC Phencyclidine 07/17/2023 Negative  Negative, Inconclusive Final    POC Methylenedioxymethamphetamine * 07/17/2023 Negative  Negative, Inconclusive Final    POC Tricyclic Antidepressants 07/17/2023    Final    POC Buprenorphine 07/17/2023 Negative   Final     Acceptable 07/17/2023 Yes   Final    POC Temperature (Urine) 07/17/2023 92   Final    TSH 07/17/2023 22.500 (H)  0.358 - 3.740 uIU/mL Final    Hemoglobin A1C 07/17/2023 7.2 (H)  4.5 - 6.6 % Final    Estimated Average Glucose 07/17/2023 154  mg/dL Final    Sodium 07/17/2023 138  136 - 145 mmol/L Final    Potassium 07/17/2023 3.8  3.5 - 5.1 mmol/L Final    Chloride 07/17/2023 108 (H)  98 - 107  mmol/L Final    CO2 07/17/2023 23  21 - 32 mmol/L Final    Anion Gap 07/17/2023 11  7 - 16 mmol/L Final    Glucose 07/17/2023 103  74 - 106 mg/dL Final    BUN 07/17/2023 8  7 - 18 mg/dL Final    Creatinine 07/17/2023 0.93  0.55 - 1.02 mg/dL Final    BUN/Creatinine Ratio 07/17/2023 9  6 - 20 Final    Calcium 07/17/2023 8.8  8.5 - 10.1 mg/dL Final    Total Protein 07/17/2023 7.2  6.4 - 8.2 g/dL Final    Albumin 07/17/2023 3.1 (L)  3.5 - 5.0 g/dL Final    Globulin 07/17/2023 4.1 (H)  2.0 - 4.0 g/dL Final    A/G Ratio 07/17/2023 0.8   Final    Bilirubin, Total 07/17/2023 0.2  >0.0 - 1.2 mg/dL Final    Alk Phos 07/17/2023 179 (H)  55 - 142 U/L Final    ALT 07/17/2023 39  13 - 56 U/L Final    AST 07/17/2023 21  15 - 37 U/L Final    eGFR 07/17/2023 67  >=60 mL/min/1.73m2 Final    Free T4 07/17/2023 1.29  0.76 - 1.46 ng/dL Final    WBC 07/17/2023 7.68  4.50 - 11.00 K/uL Final    RBC 07/17/2023 3.45 (L)  4.20 - 5.40 M/uL Final    Hemoglobin 07/17/2023 10.6 (L)  12.0 - 16.0 g/dL Final    Hematocrit 07/17/2023 33.6 (L)  38.0 - 47.0 % Final    MCV 07/17/2023 97.4 (H)  80.0 - 96.0 fL Final    MCH 07/17/2023 30.7  27.0 - 31.0 pg Final    MCHC 07/17/2023 31.5 (L)  32.0 - 36.0 g/dL Final    RDW 07/17/2023 14.1  11.5 - 14.5 % Final    Platelet Count 07/17/2023 325  150 - 400 K/uL Final    MPV 07/17/2023 11.0  9.4 - 12.4 fL Final    Neutrophils % 07/17/2023 62.2  53.0 - 65.0 % Final    Lymphocytes % 07/17/2023 25.1 (L)  27.0 - 41.0 % Final    Monocytes % 07/17/2023 8.3 (H)  2.0 - 6.0 % Final    Eosinophils % 07/17/2023 3.5  1.0 - 4.0 % Final    Basophils % 07/17/2023 0.4  0.0 - 1.0 % Final    Immature Granulocytes % 07/17/2023 0.5 (H)  0.0 - 0.4 % Final    nRBC, Auto 07/17/2023 0.0  <=0.0 % Final    Neutrophils, Abs 07/17/2023 4.77  1.80 - 7.70 K/uL Final    Lymphocytes, Absolute 07/17/2023 1.93  1.00 - 4.80 K/uL Final    Monocytes, Absolute 07/17/2023 0.64  0.00 - 0.80 K/uL Final    Eosinophils, Absolute 07/17/2023 0.27  0.00 - 0.50  K/uL Final    Basophils, Absolute 07/17/2023 0.03  0.00 - 0.20 K/uL Final    Immature Granulocytes, Absolute 07/17/2023 0.04  0.00 - 0.04 K/uL Final    nRBC, Absolute 07/17/2023 0.00  <=0.00 x10e3/uL Final    Diff Type 07/17/2023 Auto   Final    WBC, UA 07/17/2023 >182 (H)  <=5 /hpf Final    RBC, UA 07/17/2023 22 (H)  <=3 /hpf Final    Bacteria, UA 07/17/2023 Many (A)  None Seen /hpf Final    WBC Clumps 07/17/2023 Many (A)  None Seen /hpf Final    Mucous 07/17/2023 Occasional (A)  None Seen /LPF Final    Culture, Urine 07/17/2023 >100,000 Escherichia coli (A)   Final   Patient Outreach on 06/12/2023   Component Date Value Ref Range Status    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/25/2023 Positive   Final    Right Eye DM Retinopathy 04/25/2023 Positive   Final   Office Visit on 05/25/2023   Component Date Value Ref Range Status    Candida Species 05/25/2023 Negative  Negative, Invalid Final    Gardnerella 05/25/2023 Positive (A)  Negative, Invalid Final    Trichomonas 05/25/2023 Negative  Negative, Invalid Final   There may be more visits with results that are not included.         No orders of the defined types were placed in this encounter.      Requested Prescriptions     Signed Prescriptions Disp Refills    HYDROcodone-acetaminophen (NORCO)  mg per tablet 120 tablet 0     Sig: Take 1 tablet by mouth every 6 (six) hours as needed for Pain.    HYDROcodone-acetaminophen (NORCO)  mg per tablet 120 tablet 0     Sig: Take 1 tablet by mouth every 6 (six) hours as needed for Pain.    HYDROcodone-acetaminophen (NORCO)  mg per tablet 120 tablet 0     Sig: Take 1 tablet by mouth every 6 (six) hours as needed for Pain.       Assessment:     1. Lumbosacral radiculopathy    2. Thoracic spondylosis         A's of Opioid Risk Assessment  Activity:Patient can perform ADL.   Analgesia:Patients pain is partially controlled by current medication. Patient has tried OTC medications such as Tylenol and Ibuprofen with out relief.    Adverse Effects: Patient denies constipation or sedation.  Aberrant Behavior:  reviewed with no aberrant drug seeking/taking behavior.  Overdose reversal drug naloxone discussed    Drug screen reviewed        MRI thoracic spine [facility] August 17, 2023 multiple level degenerative changes    Definitive drug screen August 7, 2023 consistent with oxycodone and metabolites    X-ray lumbar spine [facility] August 7, 2023 multiple level degenerative changes    X-ray thoracic spine [facility] August 7, 2023 multiple level degenerative changes apex right Curvature about 10° similar to prior    X-ray cervical spine [facility] August 7, 2023 multiple level degenerative changes        Plan:    January prescription January 13     Use January 14 is refill date next visit          History sternotomy 20+ years ago chronic thoracic pain since that time    Lumbar surgery 20 years ago    Uses wheelchair for mobility     She states current medication is helping her discomfort she would like to continue conservative management    Continue home exercise program as directed    Patient is severely debilitated uses wheelchair for mobility    Patient does not drive    Follow-up 3 months    Dr. Nicholas, August 2024    Bring original prescription medication bottles/container/box with labels to each visit

## 2023-11-14 ENCOUNTER — OFFICE VISIT (OUTPATIENT)
Dept: PAIN MEDICINE | Facility: CLINIC | Age: 68
End: 2023-11-14
Payer: MEDICARE

## 2023-11-14 VITALS
HEART RATE: 70 BPM | HEIGHT: 59 IN | BODY MASS INDEX: 27.62 KG/M2 | WEIGHT: 137 LBS | SYSTOLIC BLOOD PRESSURE: 155 MMHG | RESPIRATION RATE: 20 BRPM | DIASTOLIC BLOOD PRESSURE: 53 MMHG

## 2023-11-14 DIAGNOSIS — M47.814 THORACIC SPONDYLOSIS: Chronic | ICD-10-CM

## 2023-11-14 DIAGNOSIS — Z79.899 ENCOUNTER FOR LONG-TERM (CURRENT) USE OF OTHER MEDICATIONS: ICD-10-CM

## 2023-11-14 DIAGNOSIS — M54.17 LUMBOSACRAL RADICULOPATHY: Primary | Chronic | ICD-10-CM

## 2023-11-14 LAB
CTP QC/QA: YES
POC (AMP) AMPHETAMINE: NEGATIVE
POC (BAR) BARBITURATES: NEGATIVE
POC (BUP) BUPRENORPHINE: NEGATIVE
POC (BZO) BENZODIAZEPINES: NEGATIVE
POC (COC) COCAINE: NEGATIVE
POC (MDMA) METHYLENEDIOXYMETHAMPHETAMINE 3,4: NEGATIVE
POC (MET) METHAMPHETAMINE: NEGATIVE
POC (MOP) OPIATES: ABNORMAL
POC (MTD) METHADONE: NEGATIVE
POC (OXY) OXYCODONE: NEGATIVE
POC (PCP) PHENCYCLIDINE: NEGATIVE
POC (TCA) TRICYCLIC ANTIDEPRESSANTS: NEGATIVE
POC TEMPERATURE (URINE): 94
POC THC: NEGATIVE

## 2023-11-14 PROCEDURE — 99999PBSHW POCT URINE DRUG SCREEN PRESUMP: ICD-10-PCS | Mod: PBBFAC,,,

## 2023-11-14 PROCEDURE — 99214 OFFICE O/P EST MOD 30 MIN: CPT | Mod: S$PBB,,, | Performed by: PHYSICIAN ASSISTANT

## 2023-11-14 PROCEDURE — 99214 PR OFFICE/OUTPT VISIT, EST, LEVL IV, 30-39 MIN: ICD-10-PCS | Mod: S$PBB,,, | Performed by: PHYSICIAN ASSISTANT

## 2023-11-14 PROCEDURE — 99215 OFFICE O/P EST HI 40 MIN: CPT | Mod: PBBFAC | Performed by: PHYSICIAN ASSISTANT

## 2023-11-14 PROCEDURE — 80305 DRUG TEST PRSMV DIR OPT OBS: CPT | Mod: PBBFAC | Performed by: PHYSICIAN ASSISTANT

## 2023-11-14 PROCEDURE — 99999PBSHW POCT URINE DRUG SCREEN PRESUMP: Mod: PBBFAC,,,

## 2023-11-14 RX ORDER — HYDROCODONE BITARTRATE AND ACETAMINOPHEN 10; 325 MG/1; MG/1
1 TABLET ORAL EVERY 6 HOURS PRN
Qty: 120 TABLET | Refills: 0 | Status: SHIPPED | OUTPATIENT
Start: 2024-01-13 | End: 2024-02-07 | Stop reason: SDUPTHER

## 2023-11-14 RX ORDER — HYDROCODONE BITARTRATE AND ACETAMINOPHEN 10; 325 MG/1; MG/1
1 TABLET ORAL EVERY 6 HOURS PRN
Qty: 120 TABLET | Refills: 0 | Status: SHIPPED | OUTPATIENT
Start: 2023-11-15 | End: 2024-02-07 | Stop reason: SDUPTHER

## 2023-11-14 RX ORDER — HYDROCODONE BITARTRATE AND ACETAMINOPHEN 10; 325 MG/1; MG/1
1 TABLET ORAL EVERY 6 HOURS PRN
Qty: 120 TABLET | Refills: 0 | Status: SHIPPED | OUTPATIENT
Start: 2023-12-15 | End: 2024-02-07 | Stop reason: SDUPTHER

## 2023-11-30 ENCOUNTER — OFFICE VISIT (OUTPATIENT)
Dept: FAMILY MEDICINE | Facility: CLINIC | Age: 68
End: 2023-11-30
Payer: MEDICARE

## 2023-11-30 VITALS
WEIGHT: 127.63 LBS | HEIGHT: 59 IN | OXYGEN SATURATION: 92 % | HEART RATE: 74 BPM | RESPIRATION RATE: 18 BRPM | DIASTOLIC BLOOD PRESSURE: 75 MMHG | BODY MASS INDEX: 25.73 KG/M2 | SYSTOLIC BLOOD PRESSURE: 176 MMHG

## 2023-11-30 DIAGNOSIS — I10 PRIMARY HYPERTENSION: Primary | ICD-10-CM

## 2023-11-30 DIAGNOSIS — R79.89 LOW SERUM PARATHYROID HORMONE (PTH): ICD-10-CM

## 2023-11-30 DIAGNOSIS — E83.52 HYPERCALCEMIA: ICD-10-CM

## 2023-11-30 DIAGNOSIS — E10.649 TYPE 1 DIABETES MELLITUS WITH HYPOGLYCEMIA AND WITHOUT COMA: ICD-10-CM

## 2023-11-30 DIAGNOSIS — E03.9 ACQUIRED HYPOTHYROIDISM: ICD-10-CM

## 2023-11-30 PROBLEM — T14.8XXA OPEN WOUND OF SKIN: Status: RESOLVED | Noted: 2022-10-24 | Resolved: 2023-11-30

## 2023-11-30 PROBLEM — Z12.11 SCREENING FOR COLON CANCER: Status: RESOLVED | Noted: 2021-04-11 | Resolved: 2023-11-30

## 2023-11-30 LAB
ALBUMIN SERPL BCP-MCNC: 3.4 G/DL (ref 3.5–5)
ALBUMIN/GLOB SERPL: 0.8 {RATIO}
ALP SERPL-CCNC: 84 U/L (ref 55–142)
ALT SERPL W P-5'-P-CCNC: 22 U/L (ref 13–56)
ANION GAP SERPL CALCULATED.3IONS-SCNC: 5 MMOL/L (ref 7–16)
AST SERPL W P-5'-P-CCNC: 22 U/L (ref 15–37)
BILIRUB SERPL-MCNC: 0.4 MG/DL (ref ?–1.2)
BUN SERPL-MCNC: 10 MG/DL (ref 7–18)
BUN/CREAT SERPL: 11 (ref 6–20)
CALCIUM SERPL-MCNC: 10.2 MG/DL (ref 8.5–10.1)
CHLORIDE SERPL-SCNC: 101 MMOL/L (ref 98–107)
CO2 SERPL-SCNC: 34 MMOL/L (ref 21–32)
CREAT SERPL-MCNC: 0.92 MG/DL (ref 0.55–1.02)
EGFR (NO RACE VARIABLE) (RUSH/TITUS): 68 ML/MIN/1.73M2
EST. AVERAGE GLUCOSE BLD GHB EST-MCNC: 146 MG/DL
GLOBULIN SER-MCNC: 4.2 G/DL (ref 2–4)
GLUCOSE SERPL-MCNC: 153 MG/DL (ref 74–106)
HBA1C MFR BLD HPLC: 6.7 % (ref 4.5–6.6)
POTASSIUM SERPL-SCNC: 4.6 MMOL/L (ref 3.5–5.1)
PROT SERPL-MCNC: 7.6 G/DL (ref 6.4–8.2)
SODIUM SERPL-SCNC: 135 MMOL/L (ref 136–145)

## 2023-11-30 PROCEDURE — 80053 COMPREHEN METABOLIC PANEL: CPT | Mod: ,,, | Performed by: CLINICAL MEDICAL LABORATORY

## 2023-11-30 PROCEDURE — 83036 HEMOGLOBIN A1C: ICD-10-PCS | Mod: ,,, | Performed by: CLINICAL MEDICAL LABORATORY

## 2023-11-30 PROCEDURE — 99213 OFFICE O/P EST LOW 20 MIN: CPT | Mod: ,,, | Performed by: NURSE PRACTITIONER

## 2023-11-30 PROCEDURE — 84443 ASSAY THYROID STIM HORMONE: CPT | Mod: ,,, | Performed by: CLINICAL MEDICAL LABORATORY

## 2023-11-30 PROCEDURE — 83970 ASSAY OF PARATHORMONE: CPT | Mod: ,,, | Performed by: CLINICAL MEDICAL LABORATORY

## 2023-11-30 PROCEDURE — 84443 TSH: ICD-10-PCS | Mod: ,,, | Performed by: CLINICAL MEDICAL LABORATORY

## 2023-11-30 PROCEDURE — 99213 PR OFFICE/OUTPT VISIT, EST, LEVL III, 20-29 MIN: ICD-10-PCS | Mod: ,,, | Performed by: NURSE PRACTITIONER

## 2023-11-30 PROCEDURE — 83970 PTH, INTACT: ICD-10-PCS | Mod: ,,, | Performed by: CLINICAL MEDICAL LABORATORY

## 2023-11-30 PROCEDURE — 80053 COMPREHENSIVE METABOLIC PANEL: ICD-10-PCS | Mod: ,,, | Performed by: CLINICAL MEDICAL LABORATORY

## 2023-11-30 PROCEDURE — 83036 HEMOGLOBIN GLYCOSYLATED A1C: CPT | Mod: ,,, | Performed by: CLINICAL MEDICAL LABORATORY

## 2023-11-30 RX ORDER — AMLODIPINE BESYLATE 5 MG/1
5 TABLET ORAL DAILY
Qty: 90 TABLET | Refills: 3 | Status: SHIPPED | OUTPATIENT
Start: 2023-11-30 | End: 2024-11-29

## 2023-11-30 NOTE — PROGRESS NOTES
Subjective:       Patient ID: Nelida Nicholas is a 68 y.o. female.    Chief Complaint: No chief complaint on file.    Ms. Nicholas presents to clinic for 3 mos follow up. No current issues today.    Active Problem List with Overview Notes    Diagnosis Date Noted    Elevated MCV 09/14/2023    Other megaloblastic anemias, not elsewhere classified 09/14/2023    Anemia 09/07/2023    Hyperlipidemia 08/24/2023    Primary hypertension 08/24/2023    Thoracic spondylosis 08/17/2023    Lumbosacral radiculopathy 08/16/2023    Dysuria 07/17/2023    Encounter for long-term current use of medication 07/17/2023    Migraine without aura and without status migrainosus, not intractable 07/17/2023    Atherosclerotic heart disease of native coronary artery with other forms of angina pectoris 07/17/2023    Atrophic vaginitis 05/29/2023    Chronic vulvitis 05/29/2023    Degenerative joint disease involving multiple joints     Back pain, thoracic     Family history of colonic polyps 01/30/2023    Tardive dyskinesia 11/11/2022    Skin lesion 09/26/2022    Constipation 08/19/2021    Elevated liver enzymes 07/22/2021    Abnormal results of liver function studies  07/22/2021    Nausea 07/22/2021    Chronic pain syndrome     Elevated levels of transaminase & lactic acid dehydrogenase     Diabetes mellitus type I     Hypothyroidism         Review of Systems   Constitutional:  Negative for chills, fatigue and fever.   HENT:  Negative for congestion, hearing loss, postnasal drip, rhinorrhea, sore throat, tinnitus and voice change.    Respiratory:  Negative for apnea, cough, choking, chest tightness and shortness of breath.    Cardiovascular:  Negative for chest pain, palpitations and leg swelling.   Gastrointestinal:  Negative for abdominal pain, constipation, diarrhea and nausea.   Genitourinary:  Negative for difficulty urinating.   Neurological:  Positive for tremors. Negative for dizziness, syncope, weakness and headaches.    Psychiatric/Behavioral:  Negative for sleep disturbance.         Objective:      Vitals:    11/30/23 1320   BP: (!) 176/75   Pulse:    Resp:       Physical Exam  Constitutional:       Appearance: Normal appearance. She is well-developed and normal weight.   HENT:      Head: Normocephalic.      Right Ear: External ear normal.      Left Ear: External ear normal.      Nose: Nose normal.      Mouth/Throat:      Lips: Pink.      Mouth: Mucous membranes are moist.      Pharynx: Oropharynx is clear.      Tonsils: No tonsillar exudate or tonsillar abscesses.   Eyes:      General: Lids are normal.      Pupils: Pupils are equal, round, and reactive to light.   Cardiovascular:      Rate and Rhythm: Normal rate.      Pulses: Normal pulses.   Pulmonary:      Effort: Pulmonary effort is normal.      Breath sounds: Normal breath sounds.   Abdominal:      Palpations: Abdomen is soft.   Musculoskeletal:         General: Normal range of motion.      Cervical back: Normal range of motion.   Skin:     General: Skin is warm and dry.   Neurological:      Mental Status: She is alert and oriented to person, place, and time.   Psychiatric:         Attention and Perception: Attention normal.         Mood and Affect: Mood normal.         Speech: Speech normal.         Behavior: Behavior normal. Behavior is cooperative.       Assessment:       1. Primary hypertension    2. Type 1 diabetes mellitus with hypoglycemia and without coma        Plan:     Problem List Items Addressed This Visit          Cardiac/Vascular    Primary hypertension - Primary (Chronic)    Current Assessment & Plan     Add Amlodipine  Monitor BP and record  Follow up in 6 weeks.           Relevant Medications    amLODIPine (NORVASC) 5 MG tablet    Other Relevant Orders    Comprehensive Metabolic Panel       Endocrine    Diabetes mellitus type I (Chronic)    Current Assessment & Plan     A1C today.  Sees Lisa Ortega month week.- she is working to get new insulin pump  approved.  Continue current therapy.           Relevant Orders    Hemoglobin A1C       Health Maintenance:  Health Maintenance Topics with due status: Not Due       Topic Last Completion Date    Colorectal Cancer Screening 01/30/2023    Eye Exam 04/25/2023    Diabetes Urine Screening 07/17/2023    Hemoglobin A1c 07/17/2023    Mammogram 08/07/2023    DEXA Scan 08/07/2023    Foot Exam 08/24/2023    Lipid Panel 09/29/2023           Cindy Jensen   [facility]   11/30/23

## 2023-11-30 NOTE — ASSESSMENT & PLAN NOTE
A1C today.  Sees Lisa Ortega month week.- she is working to get new insulin pump approved.  Continue current therapy.

## 2023-12-04 LAB
PTH-INTACT SERPL-MCNC: <6.3 PG/ML (ref 18.4–80.1)
TSH SERPL DL<=0.005 MIU/L-ACNC: 25.6 UIU/ML (ref 0.36–3.74)

## 2023-12-15 RX ORDER — FLASH GLUCOSE SENSOR
KIT MISCELLANEOUS
Qty: 1 KIT | Refills: 3 | Status: SHIPPED | OUTPATIENT
Start: 2023-12-15

## 2023-12-26 ENCOUNTER — PATIENT MESSAGE (OUTPATIENT)
Dept: DIABETES SERVICES | Facility: CLINIC | Age: 68
End: 2023-12-26
Payer: MEDICARE

## 2024-02-07 NOTE — PROGRESS NOTES
Subjective:         Patient ID: Nelida Nicholas is a 68 y.o. female.    Chief Complaint: Mid-back Pain      Pain  This is a chronic problem. The current episode started more than 1 year ago. The problem occurs daily. The problem has been waxing and waning. Associated symptoms include arthralgias. Pertinent negatives include no anorexia, change in bowel habit, chest pain, chills, coughing, diaphoresis, fever, neck pain, rash, sore throat, swollen glands, urinary symptoms, vertigo or vomiting.     Review of Systems   Constitutional:  Negative for activity change, appetite change, chills, diaphoresis, fever and unexpected weight change.   HENT:  Negative for drooling, ear discharge, ear pain, facial swelling, nosebleeds, sore throat, trouble swallowing, voice change and goiter.    Eyes:  Negative for photophobia, pain, discharge, redness and visual disturbance.   Respiratory:  Negative for apnea, cough, choking, chest tightness, shortness of breath, wheezing and stridor.    Cardiovascular:  Negative for chest pain, palpitations and leg swelling.   Gastrointestinal:  Negative for abdominal distention, anorexia, change in bowel habit, diarrhea, rectal pain, vomiting and fecal incontinence.   Endocrine: Negative for cold intolerance, heat intolerance, polydipsia, polyphagia and polyuria.   Genitourinary:  Negative for bladder incontinence, dysuria, flank pain, frequency and hot flashes.   Musculoskeletal:  Positive for arthralgias, back pain, gait problem and leg pain. Negative for neck pain.   Integumentary:  Negative for color change, pallor and rash.   Allergic/Immunologic: Negative for immunocompromised state.   Neurological:  Negative for dizziness, vertigo, seizures, syncope, facial asymmetry, speech difficulty, light-headedness, memory loss and coordination difficulties.   Hematological:  Negative for adenopathy. Does not bruise/bleed easily.   Psychiatric/Behavioral:  Negative for agitation, behavioral problems,  confusion, decreased concentration, dysphoric mood, hallucinations, self-injury and suicidal ideas. The patient is not nervous/anxious and is not hyperactive.            Past Medical History:   Diagnosis Date    Allergic rhinitis     Back pain, thoracic     Brittle diabetes     Carotid bruit     right    Chronic anemia     Chronic major depressive disorder, recurrent episode     Chronic migraine w/o aura, not intractable, w/o stat migr     Chronic pain syndrome     Constipation     Trivoli or callus     Coronary arteriosclerosis     S/P CABBAGE 1997    Degenerative joint disease involving multiple joints     Diabetes mellitus type I     Elevated levels of transaminase & lactic acid dehydrogenase     Encounter for long-term (current) use of insulin     Encounter for long-term (current) use of other medications     Epigastric pain     Fatigue     GERD (gastroesophageal reflux disease)     Hypercalcemia     Hyperkalemia     Hyperlipidemia     Hypo-osmolar hyponatremia     Hypothyroidism     Lumbosacral spondylosis with myelopathy     Memory loss     Osteoporosis     Restless legs syndrome (RLS)     Scoliosis deformity of spine     Sleep disorder     Vitamin D deficiency      Past Surgical History:   Procedure Laterality Date    ARTERIAL DOPPLER  11/07/2017    ARTERIAL DOPPLER DUPLEX Bilateral 11/07/2017    LEGS    BREAST BIOPSY      BYPASS GRAFT N/A 1999    tripple bypass surgery    CAROTID DOPPLER  11/07/2017    COMPLETE    CARPAL TUNNEL RELEASE      CHOLECYSTECTOMY      CORONARY ARTERY BYPASS GRAFT  1997    3 VESSLE BYPASS    ECHOCARDIOGRAPHY  06/27/2012    with doppler color flow    ESOPHAGOGASTRODUODENOSCOPY  03/11/2011    DIL AND BX    FINGER SURGERY      Trigger finger release all 10 fingers    NECK SURGERY      REDUCTION OF BOTH BREASTS      SHOULDER SURGERY      TOTAL REDUCTION MAMMOPLASTY      TUBAL LIGATION       Social History     Socioeconomic History    Marital status:    Occupational History     "Occupation: retired   Tobacco Use    Smoking status: Former     Current packs/day: 0.00     Types: Cigarettes     Start date:      Quit date:      Years since quittin.1    Smokeless tobacco: Never    Tobacco comments:     1 pack per week   Substance and Sexual Activity    Alcohol use: Never    Drug use: Never    Sexual activity: Not Currently     Family History   Problem Relation Age of Onset    Hypertension Mother     Heart disease Father     Hyperlipidemia Father     Hypertension Father     Breast cancer Maternal Aunt     Heart disease Maternal Grandmother     Heart disease Paternal Grandfather     Hypertension Brother      Review of patient's allergies indicates:   Allergen Reactions    Klonopin [clonazepam]     Nalbuphine     Opioids-meperidine and related     Statins-hmg-coa reductase inhibitors Other (See Comments)     Elevated Liver Enzymes    Toradol [ketorolac]         Objective:  Vitals:    24 1345   BP: (!) 137/50   Pulse: 67   Resp: 20   Weight: 63.5 kg (140 lb)   Height: 4' 11" (1.499 m)   PainSc:   5         Physical Exam  Vitals and nursing note reviewed. Exam conducted with a chaperone present.   Constitutional:       General: She is awake. She is not in acute distress.     Appearance: Normal appearance. She is not ill-appearing or diaphoretic.   HENT:      Head: Normocephalic and atraumatic.      Nose: Nose normal.      Mouth/Throat:      Mouth: Mucous membranes are moist.      Pharynx: Oropharynx is clear.   Eyes:      Conjunctiva/sclera: Conjunctivae normal.      Pupils: Pupils are equal, round, and reactive to light.   Cardiovascular:      Rate and Rhythm: Normal rate.   Pulmonary:      Effort: Pulmonary effort is normal. No respiratory distress.   Abdominal:      Palpations: Abdomen is soft.      Tenderness: There is no guarding.   Musculoskeletal:         General: Normal range of motion.      Cervical back: Normal range of motion and neck supple. No rigidity.   Skin:     " General: Skin is warm and dry.      Coloration: Skin is not jaundiced or pale.   Neurological:      General: No focal deficit present.      Mental Status: She is alert and oriented to person, place, and time. Mental status is at baseline.      Cranial Nerves: No cranial nerve deficit (II-XII).      Gait: Gait abnormal.   Psychiatric:         Mood and Affect: Mood normal.         Behavior: Behavior normal. Behavior is cooperative.         Thought Content: Thought content normal.           US Abdomen Limited  Narrative: EXAMINATION:  Abdominal ultrasound limited to the right upper quadrant    CLINICAL HISTORY:  .  Abnormal results of liver function studies    COMPARISON:  Multiple studies available, including the December 28, 2022 right upper quadrant ultrasound    TECHNIQUE:  Real-time ultrasound images are captured and archived.  Targeted ultrasound of the right upper quadrant was performed.    FINDINGS:  Liver is normal in size at 14.6 cm length.  There is no focal hepatic mass.  There is hepatopedal flow in the portal vein.    There is intrahepatic biliary dilatation as on the comparison exam.  Common bile duct measures ectatic at 14 mm as compared to 10 mm on the previous study.    Pancreas appears normal.    Right kidney measures 10.4 cm length.  Renal parenchyma is isoechoic to minimally hyperechoic to the hepatic parenchyma as can be seen with mild infiltrating and/or fibrosing medical renal parenchymal disease.  There is no focal renal mass.  There is no hydronephrosis.  Impression: There is biliary ectasia as on the previous study.  This is presumably related to patient age and prior cholecystectomy status.  If there is clinical concern for biliary obstruction, consider further evaluation with MRCP.    Evidence of mild medical renal parenchymal disease    Prior cholecystectomy    Electronically signed by: Justus Castaneda  Date:    09/13/2023  Time:    10:23       Office Visit on 11/30/2023   Component Date  Value Ref Range Status    Sodium 11/30/2023 135 (L)  136 - 145 mmol/L Final    Potassium 11/30/2023 4.6  3.5 - 5.1 mmol/L Final    Chloride 11/30/2023 101  98 - 107 mmol/L Final    CO2 11/30/2023 34 (H)  21 - 32 mmol/L Final    Anion Gap 11/30/2023 5 (L)  7 - 16 mmol/L Final    Glucose 11/30/2023 153 (H)  74 - 106 mg/dL Final    BUN 11/30/2023 10  7 - 18 mg/dL Final    Creatinine 11/30/2023 0.92  0.55 - 1.02 mg/dL Final    BUN/Creatinine Ratio 11/30/2023 11  6 - 20 Final    Calcium 11/30/2023 10.2 (H)  8.5 - 10.1 mg/dL Final    Total Protein 11/30/2023 7.6  6.4 - 8.2 g/dL Final    Albumin 11/30/2023 3.4 (L)  3.5 - 5.0 g/dL Final    Globulin 11/30/2023 4.2 (H)  2.0 - 4.0 g/dL Final    A/G Ratio 11/30/2023 0.8   Final    Bilirubin, Total 11/30/2023 0.4  >0.0 - 1.2 mg/dL Final    Alk Phos 11/30/2023 84  55 - 142 U/L Final    ALT 11/30/2023 22  13 - 56 U/L Final    AST 11/30/2023 22  15 - 37 U/L Final    eGFR 11/30/2023 68  >=60 mL/min/1.73m2 Final    Hemoglobin A1C 11/30/2023 6.7 (H)  4.5 - 6.6 % Final    Estimated Average Glucose 11/30/2023 146  mg/dL Final    TSH 11/30/2023 25.600 (H)  0.358 - 3.740 uIU/mL Final    PTH 11/30/2023 <6.30 (L)  18.40 - 80.10 pg/mL Final   Office Visit on 11/14/2023   Component Date Value Ref Range Status    POC Amphetamines 11/14/2023 Negative  Negative, Inconclusive Final    POC Barbiturates 11/14/2023 Negative  Negative, Inconclusive Final    POC Benzodiazepines 11/14/2023 Negative  Negative, Inconclusive Final    POC Cocaine 11/14/2023 Negative  Negative, Inconclusive Final    POC THC 11/14/2023 Negative  Negative, Inconclusive Final    POC Methadone 11/14/2023 Negative  Negative, Inconclusive Final    POC Methamphetamine 11/14/2023 Negative  Negative, Inconclusive Final    POC Opiates 11/14/2023 Presumptive Positive (A)  Negative, Inconclusive Final    POC Oxycodone 11/14/2023 Negative  Negative, Inconclusive Final    POC Phencyclidine 11/14/2023 Negative  Negative, Inconclusive  Final    POC Methylenedioxymethamphetamine * 11/14/2023 Negative  Negative, Inconclusive Final    POC Tricyclic Antidepressants 11/14/2023 Negative  Negative, Inconclusive Final    POC Buprenorphine 11/14/2023 Negative   Final     Acceptable 11/14/2023 Yes   Final    POC Temperature (Urine) 11/14/2023 94   Final   Lab Visit on 09/29/2023   Component Date Value Ref Range Status    Triglycerides 09/29/2023 152 (H)  35 - 150 mg/dL Final    Cholesterol 09/29/2023 147  0 - 200 mg/dL Final    HDL Cholesterol 09/29/2023 43  40 - 60 mg/dL Final    Cholesterol/HDL Ratio (Risk Factor) 09/29/2023 3.4   Final    Non-HDL 09/29/2023 104  mg/dL Final    LDL Calculated 09/29/2023 74  mg/dL Final    LDL/HDL 09/29/2023 1.7   Final    VLDL 09/29/2023 30  mg/dL Final   Office Visit on 09/26/2023   Component Date Value Ref Range Status    POC Glucose 09/26/2023 123 (A)  70 - 110 MG/DL Final   Lab Visit on 09/13/2023   Component Date Value Ref Range Status    Sodium 09/13/2023 132 (L)  136 - 145 mmol/L Final    Potassium 09/13/2023 4.6  3.5 - 5.1 mmol/L Final    Chloride 09/13/2023 103  98 - 107 mmol/L Final    CO2 09/13/2023 23  21 - 32 mmol/L Final    Anion Gap 09/13/2023 11  7 - 16 mmol/L Final    Glucose 09/13/2023 307 (H)  74 - 106 mg/dL Final    BUN 09/13/2023 11  7 - 18 mg/dL Final    Creatinine 09/13/2023 0.94  0.55 - 1.02 mg/dL Final    BUN/Creatinine Ratio 09/13/2023 12  6 - 20 Final    Calcium 09/13/2023 8.8  8.5 - 10.1 mg/dL Final    Total Protein 09/13/2023 7.0  6.4 - 8.2 g/dL Final    Albumin 09/13/2023 3.2 (L)  3.5 - 5.0 g/dL Final    Globulin 09/13/2023 3.8  2.0 - 4.0 g/dL Final    A/G Ratio 09/13/2023 0.8   Final    Bilirubin, Total 09/13/2023 0.3  >0.0 - 1.2 mg/dL Final    Alk Phos 09/13/2023 82  55 - 142 U/L Final    ALT 09/13/2023 18  13 - 56 U/L Final    AST 09/13/2023 14 (L)  15 - 37 U/L Final    eGFR 09/13/2023 66  >=60 mL/min/1.73m2 Final    Iron 09/13/2023 64  50 - 170 µg/dL Final    Iron  Saturation 09/13/2023 18  14 - 50 % Final    TIBC 09/13/2023 346  250 - 450 µg/dL Final    Ferritin 09/13/2023 90  8 - 252 ng/mL Final    WBC 09/13/2023 7.81  4.50 - 11.00 K/uL Final    RBC 09/13/2023 3.02 (L)  4.20 - 5.40 M/uL Final    Hemoglobin 09/13/2023 9.9 (L)  12.0 - 16.0 g/dL Final    Hematocrit 09/13/2023 32.2 (L)  38.0 - 47.0 % Final    MCV 09/13/2023 106.6 (H)  80.0 - 96.0 fL Final    MCH 09/13/2023 32.8 (H)  27.0 - 31.0 pg Final    MCHC 09/13/2023 30.7 (L)  32.0 - 36.0 g/dL Final    RDW 09/13/2023 13.2  11.5 - 14.5 % Final    Platelet Count 09/13/2023 225  150 - 400 K/uL Final    MPV 09/13/2023 10.5  9.4 - 12.4 fL Final    Neutrophils % 09/13/2023 77.9 (H)  53.0 - 65.0 % Final    Lymphocytes % 09/13/2023 13.7 (L)  27.0 - 41.0 % Final    Monocytes % 09/13/2023 4.6  2.0 - 6.0 % Final    Eosinophils % 09/13/2023 2.7  1.0 - 4.0 % Final    Basophils % 09/13/2023 0.6  0.0 - 1.0 % Final    Immature Granulocytes % 09/13/2023 0.5 (H)  0.0 - 0.4 % Final    nRBC, Auto 09/13/2023 0.0  <=0.0 % Final    Neutrophils, Abs 09/13/2023 6.08  1.80 - 7.70 K/uL Final    Lymphocytes, Absolute 09/13/2023 1.07  1.00 - 4.80 K/uL Final    Monocytes, Absolute 09/13/2023 0.36  0.00 - 0.80 K/uL Final    Eosinophils, Absolute 09/13/2023 0.21  0.00 - 0.50 K/uL Final    Basophils, Absolute 09/13/2023 0.05  0.00 - 0.20 K/uL Final    Immature Granulocytes, Absolute 09/13/2023 0.04  0.00 - 0.04 K/uL Final    nRBC, Absolute 09/13/2023 0.00  <=0.00 x10e3/uL Final    Diff Type 09/13/2023 Scan Smear   Final    Platelet Morphology 09/13/2023 Normal  Normal Final    Macrocytosis 09/13/2023 1+   Final    Vitamin B12 09/13/2023 505  193 - 986 pg/mL Final    Folate 09/13/2023 >20.0 (H)  3.1 - 17.5 ng/mL Final   Lab Requisition on 08/30/2023   Component Date Value Ref Range Status    Color, UA 08/30/2023 Colorless  Colorless, Straw, Yellow, Light Yellow, Dark Yellow Final    Clarity, UA 08/30/2023 Clear  Clear Final    pH, UA 08/30/2023 7.5  5.0 to  8.0 pH Units Final    Leukocytes, UA 08/30/2023 Moderate (A)  Negative Final    Nitrites, UA 08/30/2023 Negative  Negative Final    Protein, UA 08/30/2023 Negative  Negative Final    Glucose, UA 08/30/2023 Normal  Normal mg/dL Final    Ketones, UA 08/30/2023 Negative  Negative mg/dL Final    Urobilinogen, UA 08/30/2023 Normal  0.2, 1.0, Normal mg/dL Final    Bilirubin, UA 08/30/2023 Negative  Negative Final    Blood, UA 08/30/2023 Negative  Negative Final    Specific Gravity, UA 08/30/2023 1.005  <=1.030 Final    Culture, Urine 08/30/2023 >100,000 Escherichia coli (A)   Final    WBC, UA 08/30/2023 55 (H)  <=5 /hpf Final    RBC, UA 08/30/2023 2  <=3 /hpf Final    Bacteria, UA 08/30/2023 Few (A)  None Seen /hpf Final   Office Visit on 08/24/2023   Component Date Value Ref Range Status    POC Glucose 08/24/2023 228 (A)  70 - 110 MG/DL Final   Lab Visit on 08/24/2023   Component Date Value Ref Range Status    Calcium 08/24/2023 9.0  8.5 - 10.1 mg/dL Final         No orders of the defined types were placed in this encounter.      Requested Prescriptions     Pending Prescriptions Disp Refills    HYDROcodone-acetaminophen (NORCO)  mg per tablet 120 tablet 0     Sig: Take 1 tablet by mouth every 6 (six) hours as needed for Pain.    HYDROcodone-acetaminophen (NORCO)  mg per tablet 120 tablet 0     Sig: Take 1 tablet by mouth every 6 (six) hours as needed for Pain.    HYDROcodone-acetaminophen (NORCO)  mg per tablet 120 tablet 0     Sig: Take 1 tablet by mouth every 6 (six) hours as needed for Pain.       Assessment:     1. Lumbosacral radiculopathy    2. Thoracic spondylosis         A's of Opioid Risk Assessment  Activity:Patient can perform ADL.   Analgesia:Patients pain is partially controlled by current medication. Patient has tried OTC medications such as Tylenol and Ibuprofen with out relief.   Adverse Effects: Patient denies constipation or sedation.  Aberrant Behavior:  reviewed with no aberrant  drug seeking/taking behavior.  Overdose reversal drug naloxone discussed    Drug screen reviewed        MRI thoracic spine [facility] August 17, 2023 multiple level degenerative changes    Definitive drug screen August 7, 2023 consistent with oxycodone and metabolites    X-ray lumbar spine [facility] August 7, 2023 multiple level degenerative changes    X-ray thoracic spine [facility] August 7, 2023 multiple level degenerative changes apex right Curvature about 10° similar to prior    X-ray cervical spine [facility] August 7, 2023 multiple level degenerative changes        Plan:    February 12, 2024 serum drug screen    Patient is severely debilitated uses wheelchair for mobility    Patient does not drive      History sternotomy 20+ years ago chronic thoracic pain since that time    Lumbar surgery 20 years ago    Uses wheelchair for mobility     Requesting to resume physical therapy at home     Otherwise she states current medication does help with her chronic discomfort    Continue home exercise program as directed    Will order physical therapy at home    Follow-up 3 months    Dr. Nicholas, August 2024    Bring original prescription medication bottles/container/box with labels to each visit

## 2024-02-12 ENCOUNTER — OFFICE VISIT (OUTPATIENT)
Dept: PAIN MEDICINE | Facility: CLINIC | Age: 69
End: 2024-02-12
Payer: MEDICARE

## 2024-02-12 VITALS
SYSTOLIC BLOOD PRESSURE: 137 MMHG | RESPIRATION RATE: 20 BRPM | HEIGHT: 59 IN | HEART RATE: 67 BPM | BODY MASS INDEX: 28.22 KG/M2 | WEIGHT: 140 LBS | DIASTOLIC BLOOD PRESSURE: 50 MMHG

## 2024-02-12 DIAGNOSIS — M54.17 LUMBOSACRAL RADICULOPATHY: Primary | Chronic | ICD-10-CM

## 2024-02-12 DIAGNOSIS — Z79.899 ENCOUNTER FOR LONG-TERM (CURRENT) USE OF OTHER MEDICATIONS: ICD-10-CM

## 2024-02-12 DIAGNOSIS — M47.814 THORACIC SPONDYLOSIS: Chronic | ICD-10-CM

## 2024-02-12 PROCEDURE — 99215 OFFICE O/P EST HI 40 MIN: CPT | Mod: PBBFAC | Performed by: PHYSICIAN ASSISTANT

## 2024-02-12 PROCEDURE — 99214 OFFICE O/P EST MOD 30 MIN: CPT | Mod: S$PBB,,, | Performed by: PHYSICIAN ASSISTANT

## 2024-02-12 RX ORDER — HYDROCODONE BITARTRATE AND ACETAMINOPHEN 10; 325 MG/1; MG/1
1 TABLET ORAL EVERY 6 HOURS PRN
Qty: 120 TABLET | Refills: 0 | Status: SHIPPED | OUTPATIENT
Start: 2024-02-15 | End: 2024-04-17

## 2024-02-12 RX ORDER — HYDROCODONE BITARTRATE AND ACETAMINOPHEN 10; 325 MG/1; MG/1
1 TABLET ORAL EVERY 6 HOURS PRN
Qty: 120 TABLET | Refills: 0 | Status: SHIPPED | OUTPATIENT
Start: 2024-04-15 | End: 2024-04-17

## 2024-02-12 RX ORDER — HYDROCODONE BITARTRATE AND ACETAMINOPHEN 10; 325 MG/1; MG/1
1 TABLET ORAL EVERY 6 HOURS PRN
Qty: 120 TABLET | Refills: 0 | Status: SHIPPED | OUTPATIENT
Start: 2024-03-16 | End: 2024-04-17

## 2024-02-15 ENCOUNTER — OFFICE VISIT (OUTPATIENT)
Dept: FAMILY MEDICINE | Facility: CLINIC | Age: 69
End: 2024-02-15
Payer: MEDICARE

## 2024-02-15 VITALS
OXYGEN SATURATION: 97 % | HEIGHT: 59 IN | SYSTOLIC BLOOD PRESSURE: 139 MMHG | HEART RATE: 79 BPM | RESPIRATION RATE: 18 BRPM | BODY MASS INDEX: 28.96 KG/M2 | WEIGHT: 143.63 LBS | DIASTOLIC BLOOD PRESSURE: 76 MMHG

## 2024-02-15 DIAGNOSIS — Z91.199 NON COMPLIANCE WITH MEDICAL TREATMENT: ICD-10-CM

## 2024-02-15 DIAGNOSIS — Z79.899 ENCOUNTER FOR LONG-TERM CURRENT USE OF MEDICATION: Chronic | ICD-10-CM

## 2024-02-15 DIAGNOSIS — J00 ACUTE RHINITIS: ICD-10-CM

## 2024-02-15 DIAGNOSIS — G89.4 CHRONIC PAIN SYNDROME: ICD-10-CM

## 2024-02-15 DIAGNOSIS — K59.00 CONSTIPATION, UNSPECIFIED CONSTIPATION TYPE: ICD-10-CM

## 2024-02-15 DIAGNOSIS — E10.649 TYPE 1 DIABETES MELLITUS WITH HYPOGLYCEMIA AND WITHOUT COMA: Primary | ICD-10-CM

## 2024-02-15 DIAGNOSIS — E03.9 ACQUIRED HYPOTHYROIDISM: ICD-10-CM

## 2024-02-15 DIAGNOSIS — I10 PRIMARY HYPERTENSION: ICD-10-CM

## 2024-02-15 LAB
ALBUMIN SERPL BCP-MCNC: 3.4 G/DL (ref 3.5–5)
ALBUMIN/GLOB SERPL: 0.9 {RATIO}
ALP SERPL-CCNC: 149 U/L (ref 55–142)
ALT SERPL W P-5'-P-CCNC: 86 U/L (ref 13–56)
ANION GAP SERPL CALCULATED.3IONS-SCNC: 7 MMOL/L (ref 7–16)
AST SERPL W P-5'-P-CCNC: 47 U/L (ref 15–37)
BILIRUB SERPL-MCNC: 0.2 MG/DL (ref ?–1.2)
BUN SERPL-MCNC: 11 MG/DL (ref 7–18)
BUN/CREAT SERPL: 13 (ref 6–20)
CALCIUM SERPL-MCNC: 9.2 MG/DL (ref 8.5–10.1)
CHLORIDE SERPL-SCNC: 107 MMOL/L (ref 98–107)
CO2 SERPL-SCNC: 27 MMOL/L (ref 21–32)
CREAT SERPL-MCNC: 0.82 MG/DL (ref 0.55–1.02)
EGFR (NO RACE VARIABLE) (RUSH/TITUS): 78 ML/MIN/1.73M2
GLOBULIN SER-MCNC: 4 G/DL (ref 2–4)
GLUCOSE SERPL-MCNC: 38 MG/DL (ref 74–106)
POTASSIUM SERPL-SCNC: 3.5 MMOL/L (ref 3.5–5.1)
PROT SERPL-MCNC: 7.4 G/DL (ref 6.4–8.2)
SODIUM SERPL-SCNC: 137 MMOL/L (ref 136–145)
TSH SERPL DL<=0.005 MIU/L-ACNC: 27.1 UIU/ML (ref 0.36–3.74)

## 2024-02-15 PROCEDURE — 80053 COMPREHEN METABOLIC PANEL: CPT | Mod: ,,, | Performed by: CLINICAL MEDICAL LABORATORY

## 2024-02-15 PROCEDURE — 84443 ASSAY THYROID STIM HORMONE: CPT | Mod: ,,, | Performed by: CLINICAL MEDICAL LABORATORY

## 2024-02-15 PROCEDURE — 99213 OFFICE O/P EST LOW 20 MIN: CPT | Mod: ,,, | Performed by: NURSE PRACTITIONER

## 2024-02-15 RX ORDER — DULOXETIN HYDROCHLORIDE 60 MG/1
60 CAPSULE, DELAYED RELEASE ORAL DAILY
Qty: 90 CAPSULE | Refills: 3 | Status: SHIPPED | OUTPATIENT
Start: 2024-02-15

## 2024-02-15 RX ORDER — LOSARTAN POTASSIUM 100 MG/1
100 TABLET ORAL DAILY
Qty: 90 TABLET | Refills: 3 | Status: SHIPPED | OUTPATIENT
Start: 2024-02-15

## 2024-02-15 RX ORDER — FLUTICASONE PROPIONATE 50 MCG
2 SPRAY, SUSPENSION (ML) NASAL DAILY
Qty: 48 G | Refills: 3 | Status: SHIPPED | OUTPATIENT
Start: 2024-02-15

## 2024-02-15 RX ORDER — LINACLOTIDE 72 UG/1
72 CAPSULE, GELATIN COATED ORAL DAILY
Qty: 90 CAPSULE | Refills: 3 | Status: SHIPPED | OUTPATIENT
Start: 2024-02-15

## 2024-02-15 NOTE — PROGRESS NOTES
"Chart reviewed and I agree with the management of this patient. Dr. Bro  Subjective:       Patient ID: Nelida Nicholas is a 68 y.o. female.    Chief Complaint: Follow-up (Patient is in for a follow up Diabetes ) and Annual Exam    Ms. Nicholas presents to clinic for follow up and lab work.   She did not go to endocrine appt due to "migraine". She believes appt is scheduled for May.  She is currently followed by SERGEI Nascimento for diabetes management.  Dr. Razo for pain management.     Patient has been referred to endocrinology for assistance and management of Type 1 DM and hypothyroid. Appt rescheduled for April.     Patient continues to be non compliant with medical management and dietary treatment of diabetes. She lives alone in home behind brother's home. All food and medications are supplied by brother. Strict diabetic diet and medication instructions reviewed with brother.     Active Problem List with Overview Notes    Diagnosis Date Noted    Acute rhinitis 02/15/2024    Elevated MCV 09/14/2023    Other megaloblastic anemias, not elsewhere classified 09/14/2023    Anemia 09/07/2023    Hyperlipidemia 08/24/2023    Primary hypertension 08/24/2023    Thoracic spondylosis 08/17/2023    Lumbosacral radiculopathy 08/16/2023    Dysuria 07/17/2023    Encounter for long-term current use of medication 07/17/2023    Migraine without aura and without status migrainosus, not intractable 07/17/2023    Atherosclerotic heart disease of native coronary artery with other forms of angina pectoris 07/17/2023    Atrophic vaginitis 05/29/2023    Chronic vulvitis 05/29/2023    Degenerative joint disease involving multiple joints     Back pain, thoracic     Family history of colonic polyps 01/30/2023    Tardive dyskinesia 11/11/2022    Skin lesion 09/26/2022    Constipation 08/19/2021    Elevated liver enzymes 07/22/2021    Abnormal results of liver function studies  07/22/2021    Nausea 07/22/2021    Chronic pain syndrome     " Elevated levels of transaminase & lactic acid dehydrogenase     Type 1 diabetes mellitus with hypoglycemia and without coma     Hypothyroidism         Review of Systems   Constitutional:  Negative for fatigue and fever.   HENT:  Negative for congestion, hearing loss, postnasal drip, rhinorrhea, sore throat, tinnitus and voice change.    Respiratory:  Negative for apnea, cough, choking, chest tightness and shortness of breath.    Cardiovascular:  Negative for chest pain, palpitations and leg swelling.   Gastrointestinal:  Negative for abdominal pain, constipation, diarrhea and nausea.   Genitourinary:  Negative for difficulty urinating.   Neurological:  Negative for dizziness, syncope, weakness and headaches.   Psychiatric/Behavioral:  Negative for sleep disturbance.         A1C:  Recent Labs   Lab 03/29/23  1605 07/17/23  1436 11/30/23  1351   Hemoglobin A1C 7.7 H 7.2 H 6.7 H     CBC:  Recent Labs   Lab 03/29/23  1605 07/17/23  1436 09/13/23  1020   WBC 8.87 7.68 7.81   RBC 3.75 L 3.45 L 3.02 L   Hemoglobin 11.5 L 10.6 L 9.9 L   Hematocrit 34.9 L 33.6 L 32.2 L   Platelet Count 278 325 225   MCV 93.1 97.4 H 106.6 H   MCH 30.7 30.7 32.8 H   MCHC 33.0 31.5 L 30.7 L     CMP:  Recent Labs   Lab 07/17/23  1436 08/24/23  1201 09/13/23  1020 11/30/23  1351   Glucose 103  --  307 H 153 H   Calcium 8.8   < > 8.8 10.2 H   Albumin 3.1 L  --  3.2 L 3.4 L   Total Protein 7.2  --  7.0 7.6   Sodium 138  --  132 L 135 L   Potassium 3.8  --  4.6 4.6   CO2 23  --  23 34 H   Chloride 108 H  --  103 101   BUN 8  --  11 10   Creatinine 0.93  --  0.94 0.92   Alk Phos 179 H  --  82 84   ALT 39  --  18 22   AST 21  --  14 L 22   Bilirubin, Total 0.2  --  0.3 0.4    < > = values in this interval not displayed.     LIPIDS:  Recent Labs   Lab 09/29/23  0752 11/30/23  0943   TSH  --  25.600 H   HDL Cholesterol 43  --    Cholesterol 147  --    Triglycerides 152 H  --    LDL Calculated 74  --    Cholesterol/HDL Ratio (Risk Factor) 3.4  --     Non-  --      TSH:  Recent Labs   Lab 03/29/23  1605 07/17/23  1436 11/30/23  0943   TSH 3.660 22.500 H 25.600 H        Objective:      Vitals:    02/15/24 1425   BP: 139/76   Pulse: 79   Resp: 18      Physical Exam  Constitutional:       Appearance: Normal appearance. She is well-developed and normal weight.   HENT:      Head: Normocephalic.      Right Ear: External ear normal.      Left Ear: External ear normal.      Nose: Nose normal.      Mouth/Throat:      Lips: Pink.      Mouth: Mucous membranes are moist.      Pharynx: Oropharynx is clear.      Tonsils: No tonsillar exudate or tonsillar abscesses.   Eyes:      General: Lids are normal.      Pupils: Pupils are equal, round, and reactive to light.   Cardiovascular:      Rate and Rhythm: Normal rate and regular rhythm.      Pulses: Normal pulses.      Heart sounds: Normal heart sounds.   Pulmonary:      Effort: Pulmonary effort is normal.      Breath sounds: Normal breath sounds.   Abdominal:      Palpations: Abdomen is soft.   Musculoskeletal:         General: Normal range of motion.      Cervical back: Normal range of motion.   Skin:     General: Skin is warm and dry.   Neurological:      Mental Status: She is alert and oriented to person, place, and time.   Psychiatric:         Attention and Perception: Attention normal.         Mood and Affect: Mood normal.         Speech: Speech normal.         Behavior: Behavior normal. Behavior is cooperative.       Assessment:       1. Primary hypertension    2. Acquired hypothyroidism    3. Constipation, unspecified constipation type    4. Chronic pain syndrome    5. Acute rhinitis    6. Type 1 diabetes mellitus with hypoglycemia and without coma    7. Encounter for long-term current use of medication        Plan:     Problem List Items Addressed This Visit          Neuro    Chronic pain syndrome    Relevant Medications    DULoxetine (CYMBALTA) 60 MG capsule       ENT    Acute rhinitis    Relevant Medications  "   fluticasone propionate (FLONASE) 50 mcg/actuation nasal spray       Cardiac/Vascular    Primary hypertension - Primary (Chronic)    Relevant Medications    losartan (COZAAR) 100 MG tablet    Other Relevant Orders    Comprehensive Metabolic Panel       Endocrine    Hypothyroidism (Chronic)    Relevant Orders    Comprehensive Metabolic Panel    TSH    Type 1 diabetes mellitus with hypoglycemia and without coma       GI    Constipation    Relevant Medications    LINZESS 72 mcg Cap capsule       Other    Encounter for long-term current use of medication (Chronic)     During exam Ms. Nicholas became diaphoretic with delayed speech. BS (48). She was given glucagon gel. Brother was present in room. Patient reports eating poptarts as meal for the day. She is unsure if she ate anything else. Personal BG meter reading "lo". Discussed transportation to ED for immediate treatment vs personal transportation. Brother refused ambulance and stated "this happens frequently. I do not want to sit in the ED for 6 hours if BS will come up." He refused ambulance treatment. Patient and brother instructed patient needs meal with sugar and protein to increase BG and sustain level. Patient was wheeled to car in wheelchair by myself and Farrah Rosado LPN with continued instruction to go to ED for treatment. Brother and patient refuse.       Health Maintenance:  Health Maintenance Topics with due status: Not Due       Topic Last Completion Date    Colorectal Cancer Screening 01/30/2023    Diabetes Urine Screening 07/17/2023    Mammogram 08/07/2023    DEXA Scan 08/07/2023    Foot Exam 08/24/2023    Lipid Panel 09/29/2023    Hemoglobin A1c 11/30/2023      Will send patient to Dr. Coombs, internist, for further follow up care and medical management per patient request.     [facility]   2/15/24     "

## 2024-02-16 PROBLEM — Z91.199 NON COMPLIANCE WITH MEDICAL TREATMENT: Status: ACTIVE | Noted: 2024-02-16

## 2024-02-16 NOTE — PROGRESS NOTES
"2/16/24 1305 Spoke with patient. Reports getting dinner on route home after clinic visit with BS "coming up to normal". Today she feels much better and BS is regulated. Instructed to keep appt with Dr. Bolden. Referral sent to Dr. Coombs for internist care and further medical management. Patient agrees.     "

## 2024-02-16 NOTE — ASSESSMENT & PLAN NOTE
"TSH/Free T4  Call with results  Taking Synthroid 150mcg daily  Referred to endocrinology- Appt in May with Dr. Bolden- Missed previous appt due to "migraine". - 2nd referral  "

## 2024-02-23 DIAGNOSIS — M81.0 OSTEOPOROSIS, UNSPECIFIED OSTEOPOROSIS TYPE, UNSPECIFIED PATHOLOGICAL FRACTURE PRESENCE: Primary | ICD-10-CM

## 2024-02-26 ENCOUNTER — PATIENT MESSAGE (OUTPATIENT)
Dept: RHEUMATOLOGY | Facility: CLINIC | Age: 69
End: 2024-02-26
Payer: MEDICARE

## 2024-03-04 ENCOUNTER — CLINICAL SUPPORT (OUTPATIENT)
Dept: RHEUMATOLOGY | Facility: CLINIC | Age: 69
End: 2024-03-04
Payer: MEDICARE

## 2024-03-04 DIAGNOSIS — M81.0 OSTEOPOROSIS, UNSPECIFIED OSTEOPOROSIS TYPE, UNSPECIFIED PATHOLOGICAL FRACTURE PRESENCE: Primary | ICD-10-CM

## 2024-03-04 PROCEDURE — 99999PBSHW PR PBB SHADOW TECHNICAL ONLY FILED TO HB: Mod: JZ,PBBFAC,,

## 2024-03-04 PROCEDURE — 99212 OFFICE O/P EST SF 10 MIN: CPT | Mod: PBBFAC,25 | Performed by: NURSE PRACTITIONER

## 2024-03-04 PROCEDURE — 96372 THER/PROPH/DIAG INJ SC/IM: CPT | Mod: PBBFAC | Performed by: NURSE PRACTITIONER

## 2024-03-04 RX ADMIN — DENOSUMAB 60 MG: 60 INJECTION SUBCUTANEOUS at 04:03

## 2024-03-04 NOTE — PROGRESS NOTES
Patient presented to clinic for prolia injection. Tolerated injection well. No adverse reactions noted. Will return to clinic in 6 months for another injection.

## 2024-03-14 DIAGNOSIS — E10.649 TYPE 1 DIABETES MELLITUS WITH HYPOGLYCEMIA AND WITHOUT COMA: ICD-10-CM

## 2024-03-14 RX ORDER — INSULIN ASPART INJECTION 100 [IU]/ML
INJECTION, SOLUTION SUBCUTANEOUS
Qty: 90 ML | Refills: 3 | Status: SHIPPED | OUTPATIENT
Start: 2024-03-14

## 2024-03-14 RX ORDER — COLESEVELAM 180 1/1
625 TABLET ORAL 3 TIMES DAILY
Qty: 270 TABLET | Refills: 3 | Status: SHIPPED | OUTPATIENT
Start: 2024-03-14

## 2024-03-14 RX ORDER — COLESEVELAM 180 1/1
TABLET ORAL
Qty: 270 TABLET | Refills: 3 | OUTPATIENT
Start: 2024-03-14

## 2024-03-25 RX ORDER — AZELASTINE 1 MG/ML
SPRAY, METERED NASAL
Qty: 90 ML | Refills: 0 | Status: SHIPPED | OUTPATIENT
Start: 2024-03-25 | End: 2024-05-22 | Stop reason: SDUPTHER

## 2024-03-25 RX ORDER — HYDROXYZINE PAMOATE 25 MG/1
25 CAPSULE ORAL EVERY 8 HOURS PRN
Qty: 90 CAPSULE | Refills: 0 | Status: SHIPPED | OUTPATIENT
Start: 2024-03-25 | End: 2024-04-25 | Stop reason: SDUPTHER

## 2024-03-25 NOTE — TELEPHONE ENCOUNTER
Patient called stating she needs her nasal spray and vistaril reordered and sent to [facility] please and thanks!

## 2024-03-27 RX ORDER — TIZANIDINE 4 MG/1
4 TABLET ORAL 2 TIMES DAILY PRN
Qty: 60 TABLET | Refills: 0 | Status: SHIPPED | OUTPATIENT
Start: 2024-03-27 | End: 2024-04-25 | Stop reason: SDUPTHER

## 2024-03-27 NOTE — TELEPHONE ENCOUNTER
Wanted to let us know that she did get an appointment with DR. Coombs for the 5th of April but she does want a refill on  zanaflex.

## 2024-04-03 ENCOUNTER — TELEPHONE (OUTPATIENT)
Dept: DIABETES SERVICES | Facility: CLINIC | Age: 69
End: 2024-04-03
Payer: MEDICARE

## 2024-04-03 ENCOUNTER — PATIENT MESSAGE (OUTPATIENT)
Dept: DIABETES SERVICES | Facility: CLINIC | Age: 69
End: 2024-04-03
Payer: MEDICARE

## 2024-04-04 NOTE — TELEPHONE ENCOUNTER
Sent patient in-basket message to notify them of cancellation of their Diabetes Management appointment with Lisa Ortega due to provider no longer practicing at [facility] effective April 19th 2024. Patient was instructed to follow up with their preferred primary care provider for continued care.

## 2024-04-09 RX ORDER — GABAPENTIN 100 MG/1
100 CAPSULE ORAL 2 TIMES DAILY
Qty: 60 CAPSULE | Refills: 0 | Status: SHIPPED | OUTPATIENT
Start: 2024-04-09 | End: 2024-05-22 | Stop reason: SDUPTHER

## 2024-04-09 NOTE — TELEPHONE ENCOUNTER
Patient called and said she was trying to see if you could call her in some resulting medication and her gabapentin 100 mg into [facility] and if you couldn't to please give pt a call and let her know why ... And thank you !

## 2024-04-09 NOTE — TELEPHONE ENCOUNTER
Patient called in and said she has been out of her gabapentin 100mg for a week and would like you to send it in the [facility]. Thanks!

## 2024-04-15 RX ORDER — MONTELUKAST SODIUM 10 MG/1
TABLET ORAL
Qty: 90 TABLET | Refills: 3 | Status: SHIPPED | OUTPATIENT
Start: 2024-04-15

## 2024-04-15 RX ORDER — TRAZODONE HYDROCHLORIDE 150 MG/1
TABLET ORAL
Qty: 90 TABLET | Refills: 3 | Status: SHIPPED | OUTPATIENT
Start: 2024-04-15

## 2024-04-16 ENCOUNTER — PATIENT MESSAGE (OUTPATIENT)
Dept: PAIN MEDICINE | Facility: CLINIC | Age: 69
End: 2024-04-16
Payer: MEDICARE

## 2024-04-16 DIAGNOSIS — M81.0 OSTEOPOROSIS, UNSPECIFIED OSTEOPOROSIS TYPE, UNSPECIFIED PATHOLOGICAL FRACTURE PRESENCE: Primary | ICD-10-CM

## 2024-04-17 RX ORDER — HYDROCODONE BITARTRATE AND ACETAMINOPHEN 7.5; 325 MG/1; MG/1
1 TABLET ORAL EVERY 6 HOURS PRN
Qty: 120 TABLET | Refills: 0 | Status: SHIPPED | OUTPATIENT
Start: 2024-04-17 | End: 2024-04-29 | Stop reason: SDUPTHER

## 2024-04-21 RX ORDER — MIRABEGRON 50 MG/1
TABLET, FILM COATED, EXTENDED RELEASE ORAL
Qty: 90 TABLET | Refills: 3 | Status: SHIPPED | OUTPATIENT
Start: 2024-04-21

## 2024-04-25 ENCOUNTER — OFFICE VISIT (OUTPATIENT)
Dept: INTERNAL MEDICINE | Facility: CLINIC | Age: 69
End: 2024-04-25
Payer: MEDICARE

## 2024-04-25 VITALS
SYSTOLIC BLOOD PRESSURE: 124 MMHG | OXYGEN SATURATION: 95 % | DIASTOLIC BLOOD PRESSURE: 60 MMHG | HEIGHT: 59 IN | WEIGHT: 143 LBS | BODY MASS INDEX: 28.83 KG/M2 | TEMPERATURE: 98 F | HEART RATE: 81 BPM | RESPIRATION RATE: 16 BRPM

## 2024-04-25 DIAGNOSIS — I25.118 ATHEROSCLEROTIC HEART DISEASE OF NATIVE CORONARY ARTERY WITH OTHER FORMS OF ANGINA PECTORIS: ICD-10-CM

## 2024-04-25 DIAGNOSIS — E10.649 TYPE 1 DIABETES MELLITUS WITH HYPOGLYCEMIA AND WITHOUT COMA: ICD-10-CM

## 2024-04-25 DIAGNOSIS — L98.9 SKIN LESION: ICD-10-CM

## 2024-04-25 DIAGNOSIS — R30.0 DYSURIA: ICD-10-CM

## 2024-04-25 DIAGNOSIS — E78.5 HYPERLIPIDEMIA, UNSPECIFIED HYPERLIPIDEMIA TYPE: ICD-10-CM

## 2024-04-25 DIAGNOSIS — D53.9 MACROCYTIC ANEMIA: ICD-10-CM

## 2024-04-25 DIAGNOSIS — M54.17 LUMBOSACRAL RADICULOPATHY: Chronic | ICD-10-CM

## 2024-04-25 DIAGNOSIS — Z76.89 ENCOUNTER TO ESTABLISH CARE WITH NEW DOCTOR: Primary | ICD-10-CM

## 2024-04-25 DIAGNOSIS — E03.9 ACQUIRED HYPOTHYROIDISM: Chronic | ICD-10-CM

## 2024-04-25 DIAGNOSIS — G62.9 NEUROPATHY: ICD-10-CM

## 2024-04-25 DIAGNOSIS — S31.000A WOUND OF SACRAL REGION, INITIAL ENCOUNTER: ICD-10-CM

## 2024-04-25 DIAGNOSIS — I10 PRIMARY HYPERTENSION: Chronic | ICD-10-CM

## 2024-04-25 DIAGNOSIS — G89.4 CHRONIC PAIN SYNDROME: ICD-10-CM

## 2024-04-25 DIAGNOSIS — G43.009 MIGRAINE WITHOUT AURA AND WITHOUT STATUS MIGRAINOSUS, NOT INTRACTABLE: ICD-10-CM

## 2024-04-25 PROCEDURE — 99205 OFFICE O/P NEW HI 60 MIN: CPT | Mod: S$PBB,,, | Performed by: INTERNAL MEDICINE

## 2024-04-25 PROCEDURE — 99215 OFFICE O/P EST HI 40 MIN: CPT | Mod: PBBFAC | Performed by: INTERNAL MEDICINE

## 2024-04-25 RX ORDER — HYDROXYZINE PAMOATE 25 MG/1
25 CAPSULE ORAL EVERY 8 HOURS PRN
Qty: 90 CAPSULE | Refills: 5 | Status: SHIPPED | OUTPATIENT
Start: 2024-04-25 | End: 2024-05-25

## 2024-04-25 RX ORDER — TIZANIDINE 4 MG/1
4 TABLET ORAL 2 TIMES DAILY PRN
Qty: 180 TABLET | Refills: 1 | Status: SHIPPED | OUTPATIENT
Start: 2024-04-25 | End: 2024-05-25

## 2024-04-25 NOTE — PROGRESS NOTES
Subjective     Patient ID: Nelida Nicholas is a 68 y.o. female.    Chief Complaint: Establish Care (Patient is here to establish care, annual check up. She states that she has lesions on her backside, she states this has been going on for about 3 years, she states that they will clear up and then she will start breaking out again. )    Mrs. Nicholas is a 68-year-old female the presents today to establish care.  She has a past medical history of diabetes mellitus type 1 for the last 54 years, essential hypertension, depression, dyslipidemia, neuropathy, IBS, chronic pain syndrome, hypothyroidism, and osteoporosis.  She recently saw Dr. Felix goel for her diabetes.  They are in the process of trying to get her a new pump.  Last A1c was in November of 2023 and it was 6.7%.  She is supposed to go for lab work today.  She has a history of proliferative retinopathy and is legally blind.  She is up-to-date on her eye exams.  Blood pressure today is 124/60.  She also has a past medical history of coronary artery disease and had triple bypass surgery in 1999.  She follows with Dr. Hammond at [facility].  No issues with angina.  She is unable to tolerate a statin.  She is on Zetia.  She is up-to-date on most age-appropriate health screenings.  She does complain a wound on her bottom.  She has been dealing with this for several years.  She states that it will almost improve entirely and then returns.  There are no signs of infection but we have discussed set her up for wound care.  She is resting comfortably today in no distress.  She is afebrile and vital signs are stable.      Review of Systems   Constitutional:  Negative for appetite change, chills, fatigue and fever.   HENT:  Negative for nasal congestion, ear pain, hearing loss, sinus pressure/congestion and sore throat.    Eyes:  Negative for pain, redness and visual disturbance.   Respiratory:  Negative for apnea, cough, shortness of breath and wheezing.    Cardiovascular:   Negative for chest pain and palpitations.   Gastrointestinal:  Negative for abdominal pain, constipation, diarrhea and nausea.   Endocrine: Negative for cold intolerance, heat intolerance and polyuria.   Genitourinary:  Negative for dysuria and hematuria.   Musculoskeletal:  Positive for back pain. Negative for arthralgias, joint swelling, myalgias and neck pain.   Integumentary:  Positive for wound. Negative for pallor and rash.   Allergic/Immunologic: Negative for immunocompromised state.   Neurological:  Negative for tremors, seizures, weakness, headaches and memory loss.   Hematological:  Negative for adenopathy.   Psychiatric/Behavioral:  Negative for confusion, dysphoric mood and sleep disturbance. The patient is not nervous/anxious.           Objective     Physical Exam  Vitals and nursing note reviewed.   Constitutional:       General: She is not in acute distress.     Appearance: Normal appearance. She is not ill-appearing.   HENT:      Head: Normocephalic and atraumatic.      Right Ear: External ear normal.      Left Ear: External ear normal.      Nose: Nose normal.      Mouth/Throat:      Pharynx: Oropharynx is clear.   Eyes:      Extraocular Movements: Extraocular movements intact.      Conjunctiva/sclera: Conjunctivae normal.      Pupils: Pupils are equal, round, and reactive to light.   Neck:      Vascular: No carotid bruit.   Cardiovascular:      Rate and Rhythm: Normal rate and regular rhythm.      Pulses: Normal pulses.      Heart sounds: Normal heart sounds. No murmur heard.  Pulmonary:      Effort: No respiratory distress.      Breath sounds: Normal breath sounds. No wheezing or rales.   Abdominal:      General: Bowel sounds are normal.      Palpations: Abdomen is soft.   Musculoskeletal:         General: Normal range of motion.      Cervical back: Normal range of motion and neck supple.      Right lower leg: No edema.      Left lower leg: No edema.   Skin:     General: Skin is warm and dry.       Capillary Refill: Capillary refill takes less than 2 seconds.      Coloration: Skin is not pale.      Findings: Lesion present.      Comments: Wound on buttocks.  Erythematous with scab.  No signs of infection no drainage   Neurological:      General: No focal deficit present.      Mental Status: She is alert and oriented to person, place, and time.      Cranial Nerves: No cranial nerve deficit.      Sensory: Sensory deficit present.      Motor: No weakness.   Psychiatric:         Mood and Affect: Mood normal.         Judgment: Judgment normal.            Assessment and Plan     1. Encounter to establish care with new doctor    2. Lumbosacral radiculopathy    3. Chronic pain syndrome    4. Migraine without aura and without status migrainosus, not intractable    5. Primary hypertension    6. Atherosclerotic heart disease of native coronary artery with other forms of angina pectoris    7. Hyperlipidemia, unspecified hyperlipidemia type    8. Acquired hypothyroidism    9. Type 1 diabetes mellitus with hypoglycemia and without coma    10. Neuropathy    11. Wound of sacral region, initial encounter    12. Skin lesion    13. Dysuria    14. Macrocytic anemia    Other orders  -     tiZANidine (ZANAFLEX) 4 MG tablet; Take 1 tablet (4 mg total) by mouth 2 (two) times daily as needed (muscle spasm).  Dispense: 180 tablet; Refill: 1  -     hydrOXYzine pamoate (VISTARIL) 25 MG Cap; Take 1 capsule (25 mg total) by mouth every 8 (eight) hours as needed (itching, anxiety).  Dispense: 90 capsule; Refill: 5        1. Patient presents today to establish care.  She is up-to-date on most age-appropriate health screenings.  Eye exam recently completed.  She is scheduled to do some lab work today for her endocrinologist.  We will get a copy of that lab work so that we do not duplicate any test.    2. Diabetes mellitus type 1-she has an insulin pump.  She has been a diabetic for 54 years.  Complications include neuropathy and proliferative  retinopathy.  Last creatinine was 0.82.  This was in February of this year.  She recently saw Dr. Bryan Bolden, endocrinology.  A1c in March was 6.7%.  Eye exam is up-to-date.  Her endocrinologist is in the process of trying to get her a new insulin pump.    3. Essential hypertension-blood pressure 124/60.  Her target is less than 130/80.  She is on amlodipine 5 mg daily, carvedilol ., losartan 100 mg daily last creatinine in February of this year was 0.82    4. Dyslipidemia-she is unable to tolerate statins.  She is on Zetia    5. Coronary artery disease-history of bypass surgery in 1999.  She is on aspirin, beta-blocker, angiotensin receptor blocker.  She denies any chest pain.  She follows with Silvana [facility] at [facility]    6. Chronic pain syndrome-she follows with pain management    7. Neuropathy-related to 2.  She takes gabapentin and she is also on Cymbalta    8. Depression-history of severe depression in the past.  She states that since she started the Rexulti she is doing much better.    9. Hypothyroidism-clinically she appears euthyroid.  She is on 150 mcg of Synthroid    10. Wound-to buttocks.  She has been dealing with this off and on for the last 3 years.  We are going to see if we can get her set up for wound care with home health.    Billing for this encounters based on high level of medical decision-making         Follow up in about 3 months (around 7/25/2024).

## 2024-04-29 RX ORDER — GABAPENTIN 800 MG/1
800 TABLET ORAL NIGHTLY
Qty: 90 TABLET | Refills: 3 | Status: SHIPPED | OUTPATIENT
Start: 2024-04-29

## 2024-04-29 NOTE — PROGRESS NOTES
Subjective:         Patient ID: Nelida Nicholas is a 68 y.o. female.    Chief Complaint: Mid-back Pain      Pain  This is a chronic problem. The current episode started more than 1 year ago. The problem occurs daily. The problem has been unchanged. Associated symptoms include arthralgias. Pertinent negatives include no anorexia, change in bowel habit, chest pain, chills, coughing, diaphoresis, fever, neck pain, rash, sore throat, swollen glands, urinary symptoms, vertigo or vomiting.     Review of Systems   Constitutional:  Negative for activity change, appetite change, chills, diaphoresis, fever and unexpected weight change.   HENT:  Negative for drooling, ear discharge, ear pain, facial swelling, nosebleeds, sore throat, trouble swallowing, voice change and goiter.    Eyes:  Negative for photophobia, pain, discharge, redness and visual disturbance.   Respiratory:  Negative for apnea, cough, choking, chest tightness, shortness of breath, wheezing and stridor.    Cardiovascular:  Negative for chest pain, palpitations and leg swelling.   Gastrointestinal:  Negative for abdominal distention, anorexia, change in bowel habit, diarrhea, rectal pain, vomiting and fecal incontinence.   Endocrine: Negative for cold intolerance, heat intolerance, polydipsia, polyphagia and polyuria.   Genitourinary:  Negative for bladder incontinence, dysuria, flank pain, frequency and hot flashes.   Musculoskeletal:  Positive for arthralgias, back pain, gait problem and leg pain. Negative for neck pain.   Integumentary:  Negative for color change, pallor and rash.   Allergic/Immunologic: Negative for immunocompromised state.   Neurological:  Negative for dizziness, vertigo, seizures, syncope, facial asymmetry, speech difficulty, light-headedness, memory loss and coordination difficulties.   Hematological:  Negative for adenopathy. Does not bruise/bleed easily.   Psychiatric/Behavioral:  Negative for agitation, behavioral problems,  confusion, decreased concentration, dysphoric mood, hallucinations, self-injury and suicidal ideas. The patient is not nervous/anxious and is not hyperactive.            Past Medical History:   Diagnosis Date    Allergic rhinitis     Back pain, thoracic     Brittle diabetes     Carotid bruit     right    Chronic anemia     Chronic major depressive disorder, recurrent episode     Chronic migraine w/o aura, not intractable, w/o stat migr     Chronic pain syndrome     Constipation     [place] or callus     Coronary arteriosclerosis     S/P CABBAGE 1997    Degenerative joint disease involving multiple joints     Diabetes mellitus type I     Elevated levels of transaminase & lactic acid dehydrogenase     Encounter for long-term (current) use of insulin     Encounter for long-term (current) use of other medications     Epigastric pain     Fatigue     GERD (gastroesophageal reflux disease)     Hypercalcemia     Hyperkalemia     Hyperlipidemia     Hypo-osmolar hyponatremia     Hypothyroidism     Lumbosacral spondylosis with myelopathy     Memory loss     Osteoporosis     Restless legs syndrome (RLS)     Scoliosis deformity of spine     Sleep disorder     Vitamin D deficiency      Past Surgical History:   Procedure Laterality Date    ARTERIAL DOPPLER  11/07/2017    ARTERIAL DOPPLER DUPLEX Bilateral 11/07/2017    LEGS    BREAST BIOPSY      BYPASS GRAFT N/A 1999    tripple bypass surgery    CAROTID DOPPLER  11/07/2017    COMPLETE    CARPAL TUNNEL RELEASE      CHOLECYSTECTOMY      CORONARY ARTERY BYPASS GRAFT  1997    3 VESSLE BYPASS    ECHOCARDIOGRAPHY  06/27/2012    with doppler color flow    ESOPHAGOGASTRODUODENOSCOPY  03/11/2011    DIL AND BX    FINGER SURGERY      Trigger finger release all 10 fingers    NECK SURGERY      REDUCTION OF BOTH BREASTS      SHOULDER SURGERY      TOTAL REDUCTION MAMMOPLASTY      TUBAL LIGATION       Social History     Socioeconomic History    Marital status:    Occupational History     "Occupation: retired   Tobacco Use    Smoking status: Former     Current packs/day: 0.00     Types: Cigarettes     Start date:      Quit date:      Years since quittin.3    Smokeless tobacco: Never    Tobacco comments:     1 pack per week   Substance and Sexual Activity    Alcohol use: Never    Drug use: Never    Sexual activity: Not Currently     Family History   Problem Relation Name Age of Onset    Hypertension Mother      Heart disease Father      Hyperlipidemia Father      Hypertension Father      Breast cancer Maternal Aunt      Heart disease Maternal Grandmother      Heart disease Paternal Grandfather      Hypertension Brother       Review of patient's allergies indicates:   Allergen Reactions    Klonopin [clonazepam]     Nalbuphine     Opioids-meperidine and related     Statins-hmg-coa reductase inhibitors Other (See Comments)     Elevated Liver Enzymes    Toradol [ketorolac]         Objective:  Vitals:    24 1351 24 1354   BP: (!) 133/54    Pulse: 79    Resp: 20    Weight: 62.6 kg (138 lb)    Height: 4' 11" (1.499 m)    PainSc:   7   7           Physical Exam  Vitals and nursing note reviewed. Exam conducted with a chaperone present.   Constitutional:       General: She is awake. She is not in acute distress.     Appearance: Normal appearance. She is not ill-appearing or diaphoretic.   HENT:      Head: Normocephalic and atraumatic.      Nose: Nose normal.      Mouth/Throat:      Mouth: Mucous membranes are moist.      Pharynx: Oropharynx is clear.   Eyes:      Conjunctiva/sclera: Conjunctivae normal.      Pupils: Pupils are equal, round, and reactive to light.   Cardiovascular:      Rate and Rhythm: Normal rate.   Pulmonary:      Effort: Pulmonary effort is normal. No respiratory distress.   Abdominal:      Palpations: Abdomen is soft.      Tenderness: There is no guarding.   Musculoskeletal:         General: Normal range of motion.      Cervical back: Normal range of motion and neck " supple. No rigidity.   Skin:     General: Skin is warm and dry.      Coloration: Skin is not jaundiced or pale.   Neurological:      General: No focal deficit present.      Mental Status: She is alert and oriented to person, place, and time. Mental status is at baseline.      Cranial Nerves: No cranial nerve deficit (II-XII).      Gait: Gait abnormal.   Psychiatric:         Mood and Affect: Mood normal.         Behavior: Behavior normal. Behavior is cooperative.         Thought Content: Thought content normal.           US Abdomen Limited  Narrative: EXAMINATION:  Abdominal ultrasound limited to the right upper quadrant    CLINICAL HISTORY:  .  Abnormal results of liver function studies    COMPARISON:  Multiple studies available, including the December 28, 2022 right upper quadrant ultrasound    TECHNIQUE:  Real-time ultrasound images are captured and archived.  Targeted ultrasound of the right upper quadrant was performed.    FINDINGS:  Liver is normal in size at 14.6 cm length.  There is no focal hepatic mass.  There is hepatopedal flow in the portal vein.    There is intrahepatic biliary dilatation as on the comparison exam.  Common bile duct measures ectatic at 14 mm as compared to 10 mm on the previous study.    Pancreas appears normal.    Right kidney measures 10.4 cm length.  Renal parenchyma is isoechoic to minimally hyperechoic to the hepatic parenchyma as can be seen with mild infiltrating and/or fibrosing medical renal parenchymal disease.  There is no focal renal mass.  There is no hydronephrosis.  Impression: There is biliary ectasia as on the previous study.  This is presumably related to patient age and prior cholecystectomy status.  If there is clinical concern for biliary obstruction, consider further evaluation with MRCP.    Evidence of mild medical renal parenchymal disease    Prior cholecystectomy    Electronically signed by: Justus Castaneda  Date:    09/13/2023  Time:    10:23       Lab Visit  on 04/25/2024   Component Date Value Ref Range Status    Sodium 04/25/2024 134 (L)  136 - 145 mmol/L Final    Potassium 04/25/2024 4.5  3.5 - 5.1 mmol/L Final    Chloride 04/25/2024 102  98 - 107 mmol/L Final    CO2 04/25/2024 26  21 - 32 mmol/L Final    Anion Gap 04/25/2024 11  7 - 16 mmol/L Final    Glucose 04/25/2024 316 (H)  74 - 106 mg/dL Final    BUN 04/25/2024 9  7 - 18 mg/dL Final    Creatinine 04/25/2024 1.03 (H)  0.55 - 1.02 mg/dL Final    BUN/Creatinine Ratio 04/25/2024 9  6 - 20 Final    Calcium 04/25/2024 10.3 (H)  8.5 - 10.1 mg/dL Final    eGFR 04/25/2024 59 (L)  >=60 mL/min/1.73m2 Final    Hemoglobin A1C 04/25/2024 7.3 (H)  4.5 - 6.6 % Final    Estimated Average Glucose 04/25/2024 163  mg/dL Final    Triglycerides 04/25/2024 102  35 - 150 mg/dL Final    Cholesterol 04/25/2024 99  0 - 200 mg/dL Final    HDL Cholesterol 04/25/2024 29 (L)  40 - 60 mg/dL Final    Cholesterol/HDL Ratio (Risk Factor) 04/25/2024 3.4   Final    Non-HDL 04/25/2024 70  mg/dL Final    LDL Calculated 04/25/2024 50  mg/dL Final    LDL/HDL 04/25/2024 1.7   Final    VLDL 04/25/2024 20  mg/dL Final   Lab Visit on 03/04/2024   Component Date Value Ref Range Status    Calcium 03/04/2024 10.2 (H)  8.5 - 10.1 mg/dL Final   Office Visit on 02/15/2024   Component Date Value Ref Range Status    Sodium 02/15/2024 137  136 - 145 mmol/L Final    Potassium 02/15/2024 3.5  3.5 - 5.1 mmol/L Final    Chloride 02/15/2024 107  98 - 107 mmol/L Final    CO2 02/15/2024 27  21 - 32 mmol/L Final    Anion Gap 02/15/2024 7  7 - 16 mmol/L Final    Glucose 02/15/2024 38 (LL)  74 - 106 mg/dL Final    BUN 02/15/2024 11  7 - 18 mg/dL Final    Creatinine 02/15/2024 0.82  0.55 - 1.02 mg/dL Final    BUN/Creatinine Ratio 02/15/2024 13  6 - 20 Final    Calcium 02/15/2024 9.2  8.5 - 10.1 mg/dL Final    Total Protein 02/15/2024 7.4  6.4 - 8.2 g/dL Final    Albumin 02/15/2024 3.4 (L)  3.5 - 5.0 g/dL Final    Globulin 02/15/2024 4.0  2.0 - 4.0 g/dL Final    A/G Ratio  02/15/2024 0.9   Final    Bilirubin, Total 02/15/2024 0.2  >0.0 - 1.2 mg/dL Final    Alk Phos 02/15/2024 149 (H)  55 - 142 U/L Final    ALT 02/15/2024 86 (H)  13 - 56 U/L Final    AST 02/15/2024 47 (H)  15 - 37 U/L Final    eGFR 02/15/2024 78  >=60 mL/min/1.73m2 Final    TSH 02/15/2024 27.100 (H)  0.358 - 3.740 uIU/mL Final   Lab Visit on 02/12/2024   Component Date Value Ref Range Status    [place] Generic Orderable 02/12/2024 See Scanned Report   Final   Office Visit on 11/30/2023   Component Date Value Ref Range Status    Sodium 11/30/2023 135 (L)  136 - 145 mmol/L Final    Potassium 11/30/2023 4.6  3.5 - 5.1 mmol/L Final    Chloride 11/30/2023 101  98 - 107 mmol/L Final    CO2 11/30/2023 34 (H)  21 - 32 mmol/L Final    Anion Gap 11/30/2023 5 (L)  7 - 16 mmol/L Final    Glucose 11/30/2023 153 (H)  74 - 106 mg/dL Final    BUN 11/30/2023 10  7 - 18 mg/dL Final    Creatinine 11/30/2023 0.92  0.55 - 1.02 mg/dL Final    BUN/Creatinine Ratio 11/30/2023 11  6 - 20 Final    Calcium 11/30/2023 10.2 (H)  8.5 - 10.1 mg/dL Final    Total Protein 11/30/2023 7.6  6.4 - 8.2 g/dL Final    Albumin 11/30/2023 3.4 (L)  3.5 - 5.0 g/dL Final    Globulin 11/30/2023 4.2 (H)  2.0 - 4.0 g/dL Final    A/G Ratio 11/30/2023 0.8   Final    Bilirubin, Total 11/30/2023 0.4  >0.0 - 1.2 mg/dL Final    Alk Phos 11/30/2023 84  55 - 142 U/L Final    ALT 11/30/2023 22  13 - 56 U/L Final    AST 11/30/2023 22  15 - 37 U/L Final    eGFR 11/30/2023 68  >=60 mL/min/1.73m2 Final    Hemoglobin A1C 11/30/2023 6.7 (H)  4.5 - 6.6 % Final    Estimated Average Glucose 11/30/2023 146  mg/dL Final    TSH 11/30/2023 25.600 (H)  0.358 - 3.740 uIU/mL Final    PTH 11/30/2023 <6.30 (L)  18.40 - 80.10 pg/mL Final   Office Visit on 11/14/2023   Component Date Value Ref Range Status    POC Amphetamines 11/14/2023 Negative  Negative, Inconclusive Final    POC Barbiturates 11/14/2023 Negative  Negative, Inconclusive Final    POC Benzodiazepines 11/14/2023 Negative  Negative,  Inconclusive Final    POC Cocaine 11/14/2023 Negative  Negative, Inconclusive Final    POC THC 11/14/2023 Negative  Negative, Inconclusive Final    POC Methadone 11/14/2023 Negative  Negative, Inconclusive Final    POC Methamphetamine 11/14/2023 Negative  Negative, Inconclusive Final    POC Opiates 11/14/2023 Presumptive Positive (A)  Negative, Inconclusive Final    POC Oxycodone 11/14/2023 Negative  Negative, Inconclusive Final    POC Phencyclidine 11/14/2023 Negative  Negative, Inconclusive Final    POC Methylenedioxymethamphetamine * 11/14/2023 Negative  Negative, Inconclusive Final    POC Tricyclic Antidepressants 11/14/2023 Negative  Negative, Inconclusive Final    POC Buprenorphine 11/14/2023 Negative   Final     Acceptable 11/14/2023 Yes   Final    POC Temperature (Urine) 11/14/2023 94   Final         No orders of the defined types were placed in this encounter.      Requested Prescriptions     Pending Prescriptions Disp Refills    HYDROcodone-acetaminophen (NORCO) 7.5-325 mg per tablet 120 tablet 0     Sig: Take 1 tablet by mouth every 6 (six) hours as needed for Pain.       Assessment:     1. Thoracic spondylosis    2. Lumbosacral radiculopathy           A's of Opioid Risk Assessment  Activity:Patient can perform ADL.   Analgesia:Patients pain is partially controlled by current medication. Patient has tried OTC medications such as Tylenol and Ibuprofen with out relief.   Adverse Effects: Patient denies constipation or sedation.  Aberrant Behavior:  reviewed with no aberrant drug seeking/taking behavior.  Overdose reversal drug naloxone discussed    Drug screen reviewed        MRI thoracic spine [facility] August 17, 2023 multiple level degenerative changes    Definitive drug screen August 7, 2023 consistent with oxycodone and metabolites    X-ray lumbar spine [facility] August 7, 2023 multiple level degenerative changes    X-ray thoracic spine [facility] August 7, 2023 multiple  level degenerative changes apex right Curvature about 10° similar to prior    X-ray cervical spine [facility] August 7, 2023 multiple level degenerative changes        Plan:    February 12, 2024 serum drug screen    Patient is severely debilitated uses wheelchair for mobility    Patient does not drive      History sternotomy 20+ years ago chronic thoracic pain since that time    Lumbar surgery 20 years ago    Uses wheelchair for mobility     Current wheelchair greater than 10 years old     She is requesting assistance with new wheelchair    Patient is severely limited with ambulation due to her multiple comorbidities     Patient has severe osteoarthritis which impairs ability for toileting, bathing, dressing activities of daily living, within the home.  Wheelchair will significantly improve patient's ability to perform medically directed activities of daily living.  Limitation can not be sufficiently resolved by use of a walking cane, walker due to high fall risk unsteady gait.  Patient is willing and has sufficient upper extremity function to self propel in wheelchair.    She states current medications helping control her chronic pain    Continue home exercise program as directed    Follow-up 3 months    Dr. Nicholas, August 2024    Bring original prescription medication bottles/container/box with labels to each visit

## 2024-05-03 PROCEDURE — G0180 MD CERTIFICATION HHA PATIENT: HCPCS | Mod: ,,, | Performed by: INTERNAL MEDICINE

## 2024-05-06 ENCOUNTER — OFFICE VISIT (OUTPATIENT)
Dept: PAIN MEDICINE | Facility: CLINIC | Age: 69
End: 2024-05-06
Payer: MEDICARE

## 2024-05-06 VITALS
HEIGHT: 59 IN | HEART RATE: 79 BPM | DIASTOLIC BLOOD PRESSURE: 54 MMHG | RESPIRATION RATE: 20 BRPM | SYSTOLIC BLOOD PRESSURE: 133 MMHG | WEIGHT: 138 LBS | BODY MASS INDEX: 27.82 KG/M2

## 2024-05-06 DIAGNOSIS — M47.814 THORACIC SPONDYLOSIS: Chronic | ICD-10-CM

## 2024-05-06 DIAGNOSIS — Z79.899 ENCOUNTER FOR LONG-TERM (CURRENT) USE OF OTHER MEDICATIONS: ICD-10-CM

## 2024-05-06 DIAGNOSIS — M54.17 LUMBOSACRAL RADICULOPATHY: Primary | Chronic | ICD-10-CM

## 2024-05-06 LAB
CTP QC/QA: YES
POC (AMP) AMPHETAMINE: NEGATIVE
POC (BAR) BARBITURATES: NEGATIVE
POC (BUP) BUPRENORPHINE: NEGATIVE
POC (BZO) BENZODIAZEPINES: NEGATIVE
POC (COC) COCAINE: NEGATIVE
POC (MDMA) METHYLENEDIOXYMETHAMPHETAMINE 3,4: NEGATIVE
POC (MET) METHAMPHETAMINE: NEGATIVE
POC (MOP) OPIATES: ABNORMAL
POC (MTD) METHADONE: NEGATIVE
POC (OXY) OXYCODONE: NEGATIVE
POC (PCP) PHENCYCLIDINE: NEGATIVE
POC (TCA) TRICYCLIC ANTIDEPRESSANTS: NEGATIVE
POC TEMPERATURE (URINE): 90
POC THC: NEGATIVE

## 2024-05-06 PROCEDURE — 99215 OFFICE O/P EST HI 40 MIN: CPT | Mod: PBBFAC | Performed by: PHYSICIAN ASSISTANT

## 2024-05-06 PROCEDURE — 80305 DRUG TEST PRSMV DIR OPT OBS: CPT | Mod: PBBFAC | Performed by: PHYSICIAN ASSISTANT

## 2024-05-06 PROCEDURE — 99999PBSHW POCT URINE DRUG SCREEN PRESUMP: Mod: PBBFAC,,,

## 2024-05-06 PROCEDURE — 99214 OFFICE O/P EST MOD 30 MIN: CPT | Mod: S$PBB,,, | Performed by: PHYSICIAN ASSISTANT

## 2024-05-06 RX ORDER — HYDROCODONE BITARTRATE AND ACETAMINOPHEN 7.5; 325 MG/1; MG/1
1 TABLET ORAL EVERY 6 HOURS PRN
Qty: 120 TABLET | Refills: 0 | Status: SHIPPED | OUTPATIENT
Start: 2024-06-15

## 2024-05-06 RX ORDER — HYDROCODONE BITARTRATE AND ACETAMINOPHEN 7.5; 325 MG/1; MG/1
1 TABLET ORAL EVERY 6 HOURS PRN
Qty: 120 TABLET | Refills: 0 | Status: SHIPPED | OUTPATIENT
Start: 2024-07-16

## 2024-05-06 RX ORDER — HYDROCODONE BITARTRATE AND ACETAMINOPHEN 7.5; 325 MG/1; MG/1
1 TABLET ORAL EVERY 6 HOURS PRN
Qty: 120 TABLET | Refills: 0 | Status: SHIPPED | OUTPATIENT
Start: 2024-05-17

## 2024-05-09 DIAGNOSIS — Z71.89 COMPLEX CARE COORDINATION: ICD-10-CM

## 2024-05-22 ENCOUNTER — EXTERNAL HOME HEALTH (OUTPATIENT)
Dept: HOME HEALTH SERVICES | Facility: HOSPITAL | Age: 69
End: 2024-05-22
Payer: MEDICARE

## 2024-05-22 RX ORDER — GABAPENTIN 100 MG/1
100 CAPSULE ORAL 2 TIMES DAILY
Qty: 60 CAPSULE | Refills: 2 | Status: SHIPPED | OUTPATIENT
Start: 2024-05-22

## 2024-05-22 RX ORDER — FLUCONAZOLE 150 MG/1
150 TABLET ORAL DAILY
Qty: 3 TABLET | Refills: 0 | Status: SHIPPED | OUTPATIENT
Start: 2024-05-22 | End: 2024-05-25

## 2024-05-22 RX ORDER — AZELASTINE 1 MG/ML
SPRAY, METERED NASAL
Qty: 90 ML | Refills: 1 | Status: SHIPPED | OUTPATIENT
Start: 2024-05-22

## 2024-05-22 NOTE — TELEPHONE ENCOUNTER
Spoke to  nurse and she is asking for meds for possible yeast infection due to vaginal swelling, redness, and itching. She is also concerned because pt has been out of her rexulti for 2 weeks. Pt denies being suicidal. She is also needing other meds sent in. I have spoke to dr berger, he is stating to send in diflucan 150 x3 days and if not better pt is to go to obgyn.  nurse thanked me and is advising pt on what dr berger said.

## 2024-05-22 NOTE — TELEPHONE ENCOUNTER
----- Message from Jeff Weaver sent at 5/22/2024  1:37 PM CDT -----  Kenyetta from [facility] called needing to speak to a nurse about the patients results and she needs new refills on gabapentin (NEURONTIN) 100 MG capsule, azelastine (ASTELIN) 137 mcg (0.1 %) nasal spray, and brexpiprazole (REXULTI) 2 mg Tab sent to [facility] - [place], [place] - [address]    [phone]

## 2024-06-05 NOTE — TELEPHONE ENCOUNTER
----- Message from Evangelina Arizmendi sent at 6/5/2024 12:30 PM CDT -----  Who Called: Nelida Nicholas    Refill or New Rx:Refill  RX Name and Strength: mobic 7.5 mg    List of preferred pharmacies on file (remove unneeded): [facility]        Preferred Method of Contact: Phone Call  Patient's Preferred Phone Number on File: [phone]   Best Call Back Number, if different:  Additional Information:

## 2024-06-05 NOTE — TELEPHONE ENCOUNTER
Spoke to pt and let her know med will be sent in 06/06/2024 when dr berger is back in the office. Pt thanked me

## 2024-06-06 RX ORDER — MELOXICAM 7.5 MG/1
7.5 TABLET ORAL DAILY
Qty: 90 TABLET | Refills: 3 | Status: SHIPPED | OUTPATIENT
Start: 2024-06-06

## 2024-06-12 ENCOUNTER — DOCUMENT SCAN (OUTPATIENT)
Dept: HOME HEALTH SERVICES | Facility: HOSPITAL | Age: 69
End: 2024-06-12
Payer: MEDICARE

## 2024-06-18 ENCOUNTER — DOCUMENT SCAN (OUTPATIENT)
Dept: HOME HEALTH SERVICES | Facility: HOSPITAL | Age: 69
End: 2024-06-18
Payer: MEDICARE

## 2024-06-18 DIAGNOSIS — Z12.31 BREAST CANCER SCREENING BY MAMMOGRAM: Primary | ICD-10-CM

## 2024-07-02 PROCEDURE — G0179 MD RECERTIFICATION HHA PT: HCPCS | Mod: ,,, | Performed by: INTERNAL MEDICINE

## 2024-07-03 ENCOUNTER — DOCUMENT SCAN (OUTPATIENT)
Dept: HOME HEALTH SERVICES | Facility: HOSPITAL | Age: 69
End: 2024-07-03
Payer: MEDICARE

## 2024-07-12 ENCOUNTER — DOCUMENT SCAN (OUTPATIENT)
Dept: HOME HEALTH SERVICES | Facility: HOSPITAL | Age: 69
End: 2024-07-12
Payer: MEDICARE

## 2024-07-19 ENCOUNTER — EXTERNAL HOME HEALTH (OUTPATIENT)
Dept: HOME HEALTH SERVICES | Facility: HOSPITAL | Age: 69
End: 2024-07-19
Payer: MEDICARE

## 2024-07-24 ENCOUNTER — DOCUMENT SCAN (OUTPATIENT)
Dept: HOME HEALTH SERVICES | Facility: HOSPITAL | Age: 69
End: 2024-07-24
Payer: MEDICARE

## 2024-08-01 ENCOUNTER — PATIENT MESSAGE (OUTPATIENT)
Dept: DIABETES SERVICES | Facility: CLINIC | Age: 69
End: 2024-08-01
Payer: MEDICARE

## 2024-08-05 ENCOUNTER — OFFICE VISIT (OUTPATIENT)
Dept: PAIN MEDICINE | Facility: CLINIC | Age: 69
End: 2024-08-05
Payer: MEDICARE

## 2024-08-05 VITALS
HEART RATE: 74 BPM | BODY MASS INDEX: 28.63 KG/M2 | WEIGHT: 142 LBS | SYSTOLIC BLOOD PRESSURE: 147 MMHG | DIASTOLIC BLOOD PRESSURE: 60 MMHG | HEIGHT: 59 IN

## 2024-08-05 DIAGNOSIS — M47.814 THORACIC SPONDYLOSIS: Chronic | ICD-10-CM

## 2024-08-05 DIAGNOSIS — M54.17 LUMBOSACRAL RADICULOPATHY: Primary | Chronic | ICD-10-CM

## 2024-08-05 PROCEDURE — 99214 OFFICE O/P EST MOD 30 MIN: CPT | Mod: S$PBB,,, | Performed by: PHYSICIAN ASSISTANT

## 2024-08-05 PROCEDURE — 99215 OFFICE O/P EST HI 40 MIN: CPT | Mod: PBBFAC | Performed by: PHYSICIAN ASSISTANT

## 2024-08-05 PROCEDURE — 99999 PR PBB SHADOW E&M-EST. PATIENT-LVL V: CPT | Mod: PBBFAC,,, | Performed by: PHYSICIAN ASSISTANT

## 2024-08-05 RX ORDER — HYDROCODONE BITARTRATE AND ACETAMINOPHEN 7.5; 325 MG/1; MG/1
1 TABLET ORAL EVERY 6 HOURS PRN
Qty: 120 TABLET | Refills: 0 | Status: SHIPPED | OUTPATIENT
Start: 2024-08-16

## 2024-08-05 RX ORDER — HYDROCODONE BITARTRATE AND ACETAMINOPHEN 7.5; 325 MG/1; MG/1
1 TABLET ORAL EVERY 6 HOURS PRN
Qty: 120 TABLET | Refills: 0 | Status: SHIPPED | OUTPATIENT
Start: 2024-09-14

## 2024-08-05 RX ORDER — NALOXONE HYDROCHLORIDE 4 MG/.1ML
1 SPRAY NASAL ONCE
Qty: 1 EACH | Refills: 0 | Status: SHIPPED | OUTPATIENT
Start: 2024-08-05 | End: 2024-08-05

## 2024-08-05 RX ORDER — HYDROCODONE BITARTRATE AND ACETAMINOPHEN 7.5; 325 MG/1; MG/1
1 TABLET ORAL EVERY 6 HOURS PRN
Qty: 120 TABLET | Refills: 0 | Status: SHIPPED | OUTPATIENT
Start: 2024-10-15

## 2024-08-12 ENCOUNTER — TELEPHONE (OUTPATIENT)
Dept: INTERNAL MEDICINE | Facility: CLINIC | Age: 69
End: 2024-08-12
Payer: MEDICARE

## 2024-08-12 NOTE — TELEPHONE ENCOUNTER
----- Message from Kiarra Brannon sent at 8/12/2024 11:34 AM CDT -----  Regarding: RESCHEDULING APPOINTMENT  Who Called: OSCAR WILSON , BROTHER    Who Left Message for Patient:  Does the patient know what this is regarding?:      Preferred Method of Contact: Phone Call  Patient's Preferred Phone Number on File: [phone]  Best Call Back Number, if different:  Additional Information: NEEDS TO RESCHEDULE THE APPOINTMENT SHE HAS ON SEPTEMBER 4

## 2024-08-13 NOTE — TELEPHONE ENCOUNTER
I will send one month for RX. Please remind her she needs to make an appt with Dr. Coombs the internist as we discussed. He will be her PCP going forward. Lyrica 225 MG capsule Pending    Insurance response  Prescription Drug Insurance: [facility]  Notes: Prior authorization submitted - will update provider when decision has been made by insurance.

## 2024-08-14 RX ORDER — BREXPIPRAZOLE 2 MG/1
1 TABLET ORAL
Qty: 30 TABLET | Refills: 2 | Status: SHIPPED | OUTPATIENT
Start: 2024-08-14

## 2024-08-14 RX ORDER — GABAPENTIN 100 MG/1
CAPSULE ORAL
Qty: 60 CAPSULE | Refills: 2 | Status: SHIPPED | OUTPATIENT
Start: 2024-08-14

## 2024-08-24 ENCOUNTER — DOCUMENT SCAN (OUTPATIENT)
Dept: HOME HEALTH SERVICES | Facility: HOSPITAL | Age: 69
End: 2024-08-24
Payer: MEDICARE

## 2024-08-26 ENCOUNTER — DOCUMENT SCAN (OUTPATIENT)
Dept: HOME HEALTH SERVICES | Facility: HOSPITAL | Age: 69
End: 2024-08-26
Payer: MEDICARE

## 2024-08-28 ENCOUNTER — DOCUMENT SCAN (OUTPATIENT)
Dept: HOME HEALTH SERVICES | Facility: HOSPITAL | Age: 69
End: 2024-08-28
Payer: MEDICARE

## 2024-08-31 PROCEDURE — G0179 MD RECERTIFICATION HHA PT: HCPCS | Mod: ,,, | Performed by: INTERNAL MEDICINE

## 2024-09-03 RX ORDER — EZETIMIBE 10 MG/1
10 TABLET ORAL DAILY
Qty: 90 TABLET | Refills: 3 | Status: SHIPPED | OUTPATIENT
Start: 2024-09-03 | End: 2025-09-03

## 2024-09-05 ENCOUNTER — OFFICE VISIT (OUTPATIENT)
Dept: INTERNAL MEDICINE | Facility: CLINIC | Age: 69
End: 2024-09-05
Payer: MEDICARE

## 2024-09-05 VITALS
BODY MASS INDEX: 28.63 KG/M2 | SYSTOLIC BLOOD PRESSURE: 138 MMHG | WEIGHT: 142 LBS | DIASTOLIC BLOOD PRESSURE: 75 MMHG | HEART RATE: 83 BPM | TEMPERATURE: 97 F | HEIGHT: 59 IN | OXYGEN SATURATION: 97 %

## 2024-09-05 DIAGNOSIS — D53.9 MACROCYTIC ANEMIA: ICD-10-CM

## 2024-09-05 DIAGNOSIS — G89.29 CHRONIC MIDLINE THORACIC BACK PAIN: ICD-10-CM

## 2024-09-05 DIAGNOSIS — M54.6 CHRONIC MIDLINE THORACIC BACK PAIN: ICD-10-CM

## 2024-09-05 DIAGNOSIS — M54.17 LUMBOSACRAL RADICULOPATHY: Chronic | ICD-10-CM

## 2024-09-05 DIAGNOSIS — E78.5 HYPERLIPIDEMIA, UNSPECIFIED HYPERLIPIDEMIA TYPE: ICD-10-CM

## 2024-09-05 DIAGNOSIS — G89.4 CHRONIC PAIN SYNDROME: ICD-10-CM

## 2024-09-05 DIAGNOSIS — G43.009 MIGRAINE WITHOUT AURA AND WITHOUT STATUS MIGRAINOSUS, NOT INTRACTABLE: ICD-10-CM

## 2024-09-05 DIAGNOSIS — I10 PRIMARY HYPERTENSION: Chronic | ICD-10-CM

## 2024-09-05 DIAGNOSIS — E03.9 ACQUIRED HYPOTHYROIDISM: Chronic | ICD-10-CM

## 2024-09-05 DIAGNOSIS — E10.649 TYPE 1 DIABETES MELLITUS WITH HYPOGLYCEMIA AND WITHOUT COMA: ICD-10-CM

## 2024-09-05 DIAGNOSIS — R74.8 ELEVATED LIVER ENZYMES: ICD-10-CM

## 2024-09-05 DIAGNOSIS — G62.9 NEUROPATHY: ICD-10-CM

## 2024-09-05 DIAGNOSIS — Z09 FOLLOW-UP EXAM: Primary | ICD-10-CM

## 2024-09-05 DIAGNOSIS — I25.118 ATHEROSCLEROTIC HEART DISEASE OF NATIVE CORONARY ARTERY WITH OTHER FORMS OF ANGINA PECTORIS: ICD-10-CM

## 2024-09-05 PROCEDURE — 99215 OFFICE O/P EST HI 40 MIN: CPT | Mod: PBBFAC | Performed by: INTERNAL MEDICINE

## 2024-09-05 PROCEDURE — 99999 PR PBB SHADOW E&M-EST. PATIENT-LVL V: CPT | Mod: PBBFAC,,, | Performed by: INTERNAL MEDICINE

## 2024-09-05 PROCEDURE — 99215 OFFICE O/P EST HI 40 MIN: CPT | Mod: S$PBB,,, | Performed by: INTERNAL MEDICINE

## 2024-09-05 NOTE — PROGRESS NOTES
Subjective     Patient ID: Nelida Nicholas is a 69 y.o. female.    Chief Complaint: Follow-up (3m fu /Possibly memory issues? )    Mrs. Nicholas is a 69-year-old female the presents today for follow-up  She has a past medical history of diabetes mellitus type 1 for the last 54 years, essential hypertension, depression, dyslipidemia, neuropathy, IBS, chronic pain syndrome, hypothyroidism, and osteoporosis.  She has a history of proliferative retinopathy and is legally blind.  She is up-to-date on her eye exams and follows with a retina specialist every 4 months.  Blood pressure today is 130/75.  She also has a past medical history of coronary artery disease and had triple bypass surgery in 1999.  She follows with Dr. Hammond at [facility].  No issues with angina.  She is unable to tolerate a statin.  She is on Zetia.  She is up-to-date on most age-appropriate health screenings.  Home health has been coming out to help manage a wound on her bottom.  She states that it improved but recently it was started bothering her again.  She did get her new insulin pump.  We have reviewed her blood sugar measurements.  Over the last 7 days she has averaged 154, over the last 01/14/2048, over the last 01/30/2055, and over the last 90 days 155.  This corresponds to an A1c of around 7%.  She is resting comfortably today in no distress.  She has chronic low back pain and she follows with pain management.  She is afebrile and vital signs are stable.    Follow-up  Pertinent negatives include no abdominal pain, arthralgias, chest pain, chills, congestion, coughing, fatigue, fever, headaches, joint swelling, myalgias, nausea, neck pain, rash, sore throat or weakness.     Review of Systems   Constitutional:  Negative for appetite change, chills, fatigue and fever.   HENT:  Negative for nasal congestion, ear pain, hearing loss, sinus pressure/congestion and sore throat.    Eyes:  Negative for pain, redness and visual disturbance.   Respiratory:   Negative for apnea, cough, shortness of breath and wheezing.    Cardiovascular:  Negative for chest pain and palpitations.   Gastrointestinal:  Negative for abdominal pain, constipation, diarrhea and nausea.   Endocrine: Negative for cold intolerance, heat intolerance and polyuria.   Genitourinary:  Negative for dysuria and hematuria.   Musculoskeletal:  Positive for back pain. Negative for arthralgias, joint swelling, myalgias and neck pain.   Integumentary:  Positive for wound. Negative for pallor and rash.   Allergic/Immunologic: Negative for immunocompromised state.   Neurological:  Negative for tremors, seizures, weakness, headaches and memory loss.   Hematological:  Negative for adenopathy.   Psychiatric/Behavioral:  Negative for confusion, dysphoric mood and sleep disturbance. The patient is not nervous/anxious.           Objective     Physical Exam  Vitals and nursing note reviewed.   Constitutional:       General: She is not in acute distress.     Appearance: Normal appearance. She is not ill-appearing.   HENT:      Head: Normocephalic and atraumatic.      Right Ear: External ear normal.      Left Ear: External ear normal.      Nose: Nose normal.      Mouth/Throat:      Pharynx: Oropharynx is clear.   Eyes:      Extraocular Movements: Extraocular movements intact.      Conjunctiva/sclera: Conjunctivae normal.      Pupils: Pupils are equal, round, and reactive to light.   Neck:      Vascular: No carotid bruit.   Cardiovascular:      Rate and Rhythm: Normal rate and regular rhythm.      Pulses: Normal pulses.      Heart sounds: Normal heart sounds. No murmur heard.  Pulmonary:      Effort: No respiratory distress.      Breath sounds: Normal breath sounds. No wheezing or rales.   Abdominal:      General: Bowel sounds are normal.      Palpations: Abdomen is soft.   Musculoskeletal:         General: Normal range of motion.      Cervical back: Normal range of motion and neck supple.      Right lower leg: No  edema.      Left lower leg: No edema.   Skin:     General: Skin is warm and dry.      Capillary Refill: Capillary refill takes less than 2 seconds.      Coloration: Skin is not pale.      Findings: Lesion present.      Comments: Wound on buttocks.  Erythematous with scab.  No signs of infection no drainage   Neurological:      General: No focal deficit present.      Mental Status: She is alert and oriented to person, place, and time.      Cranial Nerves: No cranial nerve deficit.      Sensory: Sensory deficit present.      Motor: No weakness.   Psychiatric:         Mood and Affect: Mood normal.         Judgment: Judgment normal.            Assessment and Plan     1. Follow-up exam    2. Lumbosacral radiculopathy    3. Chronic pain syndrome    4. Migraine without aura and without status migrainosus, not intractable    5. Neuropathy    6. Primary hypertension    7. Atherosclerotic heart disease of native coronary artery with other forms of angina pectoris    8. Hyperlipidemia, unspecified hyperlipidemia type    9. Macrocytic anemia    10. Acquired hypothyroidism    11. Type 1 diabetes mellitus with hypoglycemia and without coma  -     Microalbumin/Creatinine Ratio, Urine; Future; Expected date: 09/05/2024    12. Elevated liver enzymes    13. Chronic midline thoracic back pain        1. Patient presents today for follow-up.  She is up-to-date on most age-appropriate health screenings.  Eye exam every 4 months with a retina specialist.  Vaccines are up-to-date.  She has done them through her pharmacy.  She does plan to get the most recent COVID, flu and RSV.    2. Diabetes mellitus type 1-she has an insulin pump.  She has been a diabetic for 54 years.  Complications include neuropathy and proliferative retinopathy.  She follows with endocrinology here in [facility].  Recently got a new insulin pump.  Over the last 90 days her blood sugars have averaged 155.  She has a follow-up with him next week    3.  Essential hypertension-blood pressure 130/75.  Her target is less than 130/80.  She is on amlodipine 5 mg daily, carvedilol ., losartan 100 mg daily last creatinine in February of this year was 0.82    4. Dyslipidemia-she is unable to tolerate statins.  She is on Zetia    5. Coronary artery disease-history of bypass surgery in 1999.  She is on aspirin, beta-blocker, angiotensin receptor blocker.  She denies any chest pain.  She follows with Silvana Hammond at [facility]    6. Chronic pain syndrome-she follows with pain management    7. Neuropathy-related to 2.  She takes gabapentin and she is also on Cymbalta    8. Depression-history of severe depression in the past.  She states that since she started the Rexulti she is doing much better.    9. Hypothyroidism-clinically she appears euthyroid.  She is on 150 mcg of Synthroid    10. Wound-to buttocks.  She has been dealing with this off and on for the last 3 years.  Home health is coming out and they have been helping.  Now the visits her down to just once weekly and she feels like the wounds getting a little worse.  We will see if we can increase duration of visits    Billing for this encounters based on 40 minutes of time spent on this encounter         Follow up in about 4 months (around 1/5/2025).

## 2024-09-11 ENCOUNTER — DOCUMENT SCAN (OUTPATIENT)
Dept: HOME HEALTH SERVICES | Facility: HOSPITAL | Age: 69
End: 2024-09-11
Payer: MEDICARE

## 2024-09-12 ENCOUNTER — EXTERNAL HOME HEALTH (OUTPATIENT)
Dept: HOME HEALTH SERVICES | Facility: HOSPITAL | Age: 69
End: 2024-09-12
Payer: MEDICARE

## 2024-09-16 ENCOUNTER — DOCUMENT SCAN (OUTPATIENT)
Dept: HOME HEALTH SERVICES | Facility: HOSPITAL | Age: 69
End: 2024-09-16
Payer: MEDICARE

## 2024-09-16 RX ORDER — HYDROXYZINE PAMOATE 25 MG/1
CAPSULE ORAL
Qty: 90 CAPSULE | Refills: 5 | Status: SHIPPED | OUTPATIENT
Start: 2024-09-16

## 2024-10-07 RX ORDER — TIZANIDINE 4 MG/1
TABLET ORAL
Qty: 180 TABLET | Refills: 1 | Status: SHIPPED | OUTPATIENT
Start: 2024-10-07

## 2024-10-11 ENCOUNTER — PATIENT MESSAGE (OUTPATIENT)
Dept: FAMILY MEDICINE | Facility: CLINIC | Age: 69
End: 2024-10-11
Payer: MEDICARE

## 2024-10-31 ENCOUNTER — DOCUMENT SCAN (OUTPATIENT)
Dept: HOME HEALTH SERVICES | Facility: HOSPITAL | Age: 69
End: 2024-10-31
Payer: MEDICARE

## 2024-11-04 NOTE — PROGRESS NOTES
Subjective:         Patient ID: Nelida Nicholas is a 69 y.o. female.    Chief Complaint: Mid-back Pain      Pain  This is a chronic problem. The current episode started more than 1 year ago. The problem occurs daily. The problem has been unchanged. Associated symptoms include arthralgias. Pertinent negatives include no anorexia, change in bowel habit, chest pain, chills, coughing, diaphoresis, fever, neck pain, rash, sore throat, swollen glands, urinary symptoms, vertigo or vomiting.     Review of Systems   Constitutional:  Negative for activity change, appetite change, chills, diaphoresis, fever and unexpected weight change.   HENT:  Negative for drooling, ear discharge, ear pain, facial swelling, nosebleeds, sore throat, trouble swallowing, voice change and goiter.    Eyes:  Negative for photophobia, pain, discharge, redness and visual disturbance.   Respiratory:  Negative for apnea, cough, choking, chest tightness, shortness of breath, wheezing and stridor.    Cardiovascular:  Negative for chest pain, palpitations and leg swelling.   Gastrointestinal:  Negative for abdominal distention, anorexia, change in bowel habit, diarrhea, rectal pain, vomiting and fecal incontinence.   Endocrine: Negative for cold intolerance, heat intolerance, polydipsia, polyphagia and polyuria.   Genitourinary:  Negative for bladder incontinence, dysuria, flank pain, frequency and hot flashes.   Musculoskeletal:  Positive for arthralgias, back pain, gait problem and leg pain. Negative for neck pain.   Integumentary:  Negative for color change, pallor and rash.   Allergic/Immunologic: Negative for immunocompromised state.   Neurological:  Negative for dizziness, vertigo, seizures, syncope, facial asymmetry, speech difficulty, light-headedness, memory loss and coordination difficulties.   Hematological:  Negative for adenopathy. Does not bruise/bleed easily.   Psychiatric/Behavioral:  Negative for agitation, behavioral problems,  confusion, decreased concentration, dysphoric mood, hallucinations, self-injury and suicidal ideas. The patient is not nervous/anxious and is not hyperactive.            Past Medical History:   Diagnosis Date    Allergic rhinitis     Back pain, thoracic     Brittle diabetes     Carotid bruit     right    Chronic anemia     Chronic major depressive disorder, recurrent episode     Chronic migraine w/o aura, not intractable, w/o stat migr     Chronic pain syndrome     Constipation     [place] or callus     Coronary arteriosclerosis     S/P CABBAGE 1997    Degenerative joint disease involving multiple joints     Diabetes mellitus type I     Elevated levels of transaminase & lactic acid dehydrogenase     Encounter for long-term (current) use of insulin     Encounter for long-term (current) use of other medications     Epigastric pain     Fatigue     GERD (gastroesophageal reflux disease)     Hypercalcemia     Hyperkalemia     Hyperlipidemia     Hypo-osmolar hyponatremia     Hypothyroidism     Lumbosacral spondylosis with myelopathy     Memory loss     Osteoporosis     Restless legs syndrome (RLS)     Scoliosis deformity of spine     Sleep disorder     Vitamin D deficiency      Past Surgical History:   Procedure Laterality Date    ARTERIAL DOPPLER  11/07/2017    ARTERIAL DOPPLER DUPLEX Bilateral 11/07/2017    LEGS    BREAST BIOPSY      BYPASS GRAFT N/A 1999    tripple bypass surgery    CAROTID DOPPLER  11/07/2017    COMPLETE    CARPAL TUNNEL RELEASE      CHOLECYSTECTOMY      CORONARY ARTERY BYPASS GRAFT  1997    3 VESSLE BYPASS    ECHOCARDIOGRAPHY  06/27/2012    with doppler color flow    ESOPHAGOGASTRODUODENOSCOPY  03/11/2011    DIL AND BX    FINGER SURGERY      Trigger finger release all 10 fingers    NECK SURGERY      REDUCTION OF BOTH BREASTS      SHOULDER SURGERY      TOTAL REDUCTION MAMMOPLASTY      TUBAL LIGATION       Social History     Socioeconomic History    Marital status:    Occupational History     "Occupation: retired   Tobacco Use    Smoking status: Former     Current packs/day: 0.00     Types: Cigarettes     Start date:      Quit date:      Years since quittin.8    Smokeless tobacco: Never    Tobacco comments:     1 pack per week   Substance and Sexual Activity    Alcohol use: Never    Drug use: Never    Sexual activity: Not Currently     Family History   Problem Relation Name Age of Onset    Hypertension Mother      Heart disease Father      Hyperlipidemia Father      Hypertension Father      Breast cancer Maternal Aunt      Heart disease Maternal Grandmother      Heart disease Paternal Grandfather      Hypertension Brother       Review of patient's allergies indicates:   Allergen Reactions    Klonopin [clonazepam]     Nalbuphine     Opioids-meperidine and related     Statins-hmg-coa reductase inhibitors Other (See Comments)     Elevated Liver Enzymes    Toradol [ketorolac]         Objective:  Vitals:    24 1502   BP: (!) 139/56   Pulse: 74   Resp: 16   Weight: 64.4 kg (141 lb 15.6 oz)   Height: 4' 11" (1.499 m)   PainSc:   6               Physical Exam  Vitals and nursing note reviewed. Exam conducted with a chaperone present.   Constitutional:       General: She is awake. She is not in acute distress.     Appearance: Normal appearance. She is not ill-appearing or diaphoretic.   HENT:      Head: Normocephalic and atraumatic.      Nose: Nose normal.      Mouth/Throat:      Mouth: Mucous membranes are moist.      Pharynx: Oropharynx is clear.   Eyes:      Conjunctiva/sclera: Conjunctivae normal.      Pupils: Pupils are equal, round, and reactive to light.   Cardiovascular:      Rate and Rhythm: Normal rate.   Pulmonary:      Effort: Pulmonary effort is normal. No respiratory distress.   Abdominal:      Palpations: Abdomen is soft.      Tenderness: There is no guarding.   Musculoskeletal:         General: Normal range of motion.      Cervical back: Normal range of motion and neck supple. No " rigidity.   Skin:     General: Skin is warm and dry.      Coloration: Skin is not jaundiced or pale.   Neurological:      General: No focal deficit present.      Mental Status: She is alert and oriented to person, place, and time. Mental status is at baseline.      Cranial Nerves: No cranial nerve deficit (II-XII).      Gait: Gait abnormal.   Psychiatric:         Mood and Affect: Mood normal.         Behavior: Behavior normal. Behavior is cooperative.         Thought Content: Thought content normal.           US Abdomen Limited  Narrative: EXAMINATION:  Abdominal ultrasound limited to the right upper quadrant    CLINICAL HISTORY:  .  Abnormal results of liver function studies    COMPARISON:  Multiple studies available, including the December 28, 2022 right upper quadrant ultrasound    TECHNIQUE:  Real-time ultrasound images are captured and archived.  Targeted ultrasound of the right upper quadrant was performed.    FINDINGS:  Liver is normal in size at 14.6 cm length.  There is no focal hepatic mass.  There is hepatopedal flow in the portal vein.    There is intrahepatic biliary dilatation as on the comparison exam.  Common bile duct measures ectatic at 14 mm as compared to 10 mm on the previous study.    Pancreas appears normal.    Right kidney measures 10.4 cm length.  Renal parenchyma is isoechoic to minimally hyperechoic to the hepatic parenchyma as can be seen with mild infiltrating and/or fibrosing medical renal parenchymal disease.  There is no focal renal mass.  There is no hydronephrosis.  Impression: There is biliary ectasia as on the previous study.  This is presumably related to patient age and prior cholecystectomy status.  If there is clinical concern for biliary obstruction, consider further evaluation with MRCP.    Evidence of mild medical renal parenchymal disease    Prior cholecystectomy    Electronically signed by: Justus Castaneda  Date:    09/13/2023  Time:    10:23       Lab Visit on  09/18/2024   Component Date Value Ref Range Status    Sodium 09/18/2024 135 (L)  136 - 145 mmol/L Final    Potassium 09/18/2024 4.1  3.5 - 5.1 mmol/L Final    Chloride 09/18/2024 104  98 - 107 mmol/L Final    CO2 09/18/2024 29  21 - 32 mmol/L Final    Anion Gap 09/18/2024 6 (L)  7 - 16 mmol/L Final    Glucose 09/18/2024 114 (H)  74 - 106 mg/dL Final    BUN 09/18/2024 12  7 - 18 mg/dL Final    Creatinine 09/18/2024 0.92  0.55 - 1.02 mg/dL Final    BUN/Creatinine Ratio 09/18/2024 13  6 - 20 Final    Calcium 09/18/2024 10.3 (H)  8.5 - 10.1 mg/dL Final    eGFR 09/18/2024 67  >=60 mL/min/1.73m2 Final    Hemoglobin A1C 09/18/2024 6.4  4.5 - 6.6 % Final    Estimated Average Glucose 09/18/2024 137  mg/dL Final    Triglycerides 09/18/2024 137  35 - 150 mg/dL Final    Cholesterol 09/18/2024 145  0 - 200 mg/dL Final    HDL Cholesterol 09/18/2024 37 (L)  40 - 60 mg/dL Final    Cholesterol/HDL Ratio (Risk Factor) 09/18/2024 3.9   Final    Non-HDL 09/18/2024 108  mg/dL Final    LDL Calculated 09/18/2024 81  mg/dL Final    LDL/HDL 09/18/2024 2.2   Final    VLDL 09/18/2024 27  mg/dL Final   Office Visit on 08/05/2024   Component Date Value Ref Range Status    POC Amphetamines 08/14/2024 Negative  Negative, Inconclusive Final    POC Barbiturates 08/14/2024 Negative  Negative, Inconclusive Final    POC Benzodiazepines 08/14/2024 Negative  Negative, Inconclusive Final    POC Cocaine 08/14/2024 Negative  Negative, Inconclusive Final    POC THC 08/14/2024 Negative  Negative, Inconclusive Final    POC Methadone 08/14/2024 Negative  Negative, Inconclusive Final    POC Methamphetamine 08/14/2024 Negative  Negative, Inconclusive Final    POC Opiates 08/14/2024 Presumptive Positive (A)  Negative, Inconclusive Final    POC Oxycodone 08/14/2024 Negative  Negative, Inconclusive Final    POC Phencyclidine 08/14/2024 Negative  Negative, Inconclusive Final    POC Methylenedioxymethamphetamine * 08/14/2024 Negative  Negative, Inconclusive Final     POC Tricyclic Antidepressants 08/14/2024 Negative  Negative, Inconclusive Final    POC Buprenorphine 08/14/2024 Negative   Final     Acceptable 08/14/2024 Yes   Final    POC Temperature (Urine) 08/14/2024 90   Final         No orders of the defined types were placed in this encounter.      Requested Prescriptions     Signed Prescriptions Disp Refills    HYDROcodone-acetaminophen (NORCO) 7.5-325 mg per tablet 120 tablet 0     Sig: Take 1 tablet by mouth every 6 (six) hours as needed for Pain.    HYDROcodone-acetaminophen (NORCO) 7.5-325 mg per tablet 120 tablet 0     Sig: Take 1 tablet by mouth every 6 (six) hours as needed for Pain.    HYDROcodone-acetaminophen (NORCO) 7.5-325 mg per tablet 120 tablet 0     Sig: Take 1 tablet by mouth every 6 (six) hours as needed for Pain.       Assessment:     1. Lumbosacral radiculopathy    2. Thoracic spondylosis    3. Migraine without aura and without status migrainosus, not intractable               A's of Opioid Risk Assessment  Activity:Patient can perform ADL.   Analgesia:Patients pain is partially controlled by current medication. Patient has tried OTC medications such as Tylenol and Ibuprofen with out relief.   Adverse Effects: Patient denies constipation or sedation.  Aberrant Behavior:  reviewed with no aberrant drug seeking/taking behavior.  Overdose reversal drug naloxone discussed    Drug screen reviewed        MRI thoracic spine [facility] August 17, 2023 multiple level degenerative changes    Definitive drug screen August 7, 2023 consistent with oxycodone and metabolites    X-ray lumbar spine [facility] August 7, 2023 multiple level degenerative changes    X-ray thoracic spine [facility] August 7, 2023 multiple level degenerative changes apex right Curvature about 10° similar to prior    X-ray cervical spine [facility] August 7, 2023 multiple level degenerative changes        Plan:    Narcan August 2024    Patient is severely  debilitated uses wheelchair for mobility    Patient does not drive    History sternotomy 20+ years ago chronic thoracic pain since that time    Lumbar surgery 20 years ago    Patient is severely limited with ambulation due to her multiple comorbidities      Complaint increasing migraines     She states she was receiving Botox injections for migraines it did help with her discomfort but she had to discontinue due to insurance reasons not paying for Botox    She states if we can get her insurance to cover her Botox injection she would like to resume Botox injections for migraines    She would like to continue current medication for now it does help with her joint back pain    Continue home exercise program as directed    Follow-up 3 months    Dr. Nicholas, August 2024    Bring original prescription medication bottles/container/box with labels to each visit

## 2024-11-12 ENCOUNTER — OFFICE VISIT (OUTPATIENT)
Dept: PAIN MEDICINE | Facility: CLINIC | Age: 69
End: 2024-11-12
Payer: MEDICARE

## 2024-11-12 VITALS
BODY MASS INDEX: 28.63 KG/M2 | RESPIRATION RATE: 16 BRPM | WEIGHT: 142 LBS | HEIGHT: 59 IN | DIASTOLIC BLOOD PRESSURE: 56 MMHG | HEART RATE: 74 BPM | SYSTOLIC BLOOD PRESSURE: 139 MMHG

## 2024-11-12 DIAGNOSIS — M47.814 THORACIC SPONDYLOSIS: Chronic | ICD-10-CM

## 2024-11-12 DIAGNOSIS — G43.009 MIGRAINE WITHOUT AURA AND WITHOUT STATUS MIGRAINOSUS, NOT INTRACTABLE: Chronic | ICD-10-CM

## 2024-11-12 DIAGNOSIS — M54.17 LUMBOSACRAL RADICULOPATHY: Primary | Chronic | ICD-10-CM

## 2024-11-12 PROCEDURE — 99215 OFFICE O/P EST HI 40 MIN: CPT | Mod: PBBFAC | Performed by: PHYSICIAN ASSISTANT

## 2024-11-12 PROCEDURE — 99214 OFFICE O/P EST MOD 30 MIN: CPT | Mod: S$PBB,,, | Performed by: PHYSICIAN ASSISTANT

## 2024-11-12 PROCEDURE — 99999 PR PBB SHADOW E&M-EST. PATIENT-LVL V: CPT | Mod: PBBFAC,,, | Performed by: PHYSICIAN ASSISTANT

## 2024-11-12 RX ORDER — HYDROCODONE BITARTRATE AND ACETAMINOPHEN 7.5; 325 MG/1; MG/1
1 TABLET ORAL EVERY 6 HOURS PRN
Qty: 120 TABLET | Refills: 0 | Status: SHIPPED | OUTPATIENT
Start: 2025-01-14

## 2024-11-12 RX ORDER — HYDROCODONE BITARTRATE AND ACETAMINOPHEN 7.5; 325 MG/1; MG/1
1 TABLET ORAL EVERY 6 HOURS PRN
Qty: 120 TABLET | Refills: 0 | Status: SHIPPED | OUTPATIENT
Start: 2024-11-15

## 2024-11-12 RX ORDER — HYDROCODONE BITARTRATE AND ACETAMINOPHEN 7.5; 325 MG/1; MG/1
1 TABLET ORAL EVERY 6 HOURS PRN
Qty: 120 TABLET | Refills: 0 | Status: SHIPPED | OUTPATIENT
Start: 2024-12-14

## 2024-11-13 ENCOUNTER — TELEPHONE (OUTPATIENT)
Dept: INTERNAL MEDICINE | Facility: CLINIC | Age: 69
End: 2024-11-13
Payer: MEDICARE

## 2024-11-13 NOTE — TELEPHONE ENCOUNTER
----- Message from Klarissa sent at 11/13/2024  3:31 PM CST -----  Regarding: Calling Back  Who Called: Nelida Nichoals    Patient is returning phone call    Who Left Message for Patient:Arminda Salazar  Does the patient know what this is regarding?:Yes; reschedule appointment    Preferred Method of Contact: Phone Call  Patient's Preferred Phone Number on File: [phone]

## 2024-11-18 ENCOUNTER — DOCUMENT SCAN (OUTPATIENT)
Dept: HOME HEALTH SERVICES | Facility: HOSPITAL | Age: 69
End: 2024-11-18
Payer: MEDICARE

## 2024-11-22 ENCOUNTER — EXTERNAL HOME HEALTH (OUTPATIENT)
Dept: HOME HEALTH SERVICES | Facility: HOSPITAL | Age: 69
End: 2024-11-22
Payer: MEDICARE

## 2024-11-26 DIAGNOSIS — I10 PRIMARY HYPERTENSION: ICD-10-CM

## 2024-11-26 RX ORDER — GABAPENTIN 100 MG/1
CAPSULE ORAL
Qty: 60 CAPSULE | Refills: 2 | Status: SHIPPED | OUTPATIENT
Start: 2024-11-26

## 2024-11-26 RX ORDER — BREXPIPRAZOLE 2 MG/1
1 TABLET ORAL
Qty: 30 TABLET | Refills: 2 | Status: SHIPPED | OUTPATIENT
Start: 2024-11-26

## 2024-12-09 DIAGNOSIS — I10 PRIMARY HYPERTENSION: ICD-10-CM

## 2024-12-09 RX ORDER — AMLODIPINE BESYLATE 5 MG/1
5 TABLET ORAL DAILY
Qty: 90 TABLET | Refills: 3 | Status: SHIPPED | OUTPATIENT
Start: 2024-12-09 | End: 2025-12-09

## 2024-12-09 RX ORDER — AMLODIPINE BESYLATE 5 MG/1
TABLET ORAL
Qty: 90 TABLET | Refills: 3 | OUTPATIENT
Start: 2024-12-09

## 2024-12-09 RX ORDER — LEVOTHYROXINE SODIUM 150 UG/1
150 TABLET ORAL
Qty: 90 TABLET | Refills: 3 | Status: SHIPPED | OUTPATIENT
Start: 2024-12-09 | End: 2025-12-04

## 2024-12-09 RX ORDER — LEVOTHYROXINE SODIUM 150 UG/1
TABLET ORAL
Qty: 90 TABLET | Refills: 4 | OUTPATIENT
Start: 2024-12-09

## 2024-12-18 ENCOUNTER — HOSPITAL ENCOUNTER (OUTPATIENT)
Dept: RADIOLOGY | Facility: HOSPITAL | Age: 69
Discharge: HOME | End: 2024-12-18
Attending: NURSE PRACTITIONER
Payer: MEDICARE

## 2024-12-18 VITALS — WEIGHT: 144 LBS | BODY MASS INDEX: 29.03 KG/M2 | HEIGHT: 59 IN

## 2024-12-18 DIAGNOSIS — Z12.31 SCREENING MAMMOGRAM, ENCOUNTER FOR: Primary | ICD-10-CM

## 2024-12-18 DIAGNOSIS — Z12.31 BREAST CANCER SCREENING BY MAMMOGRAM: ICD-10-CM

## 2024-12-18 PROCEDURE — 77067 SCR MAMMO BI INCL CAD: CPT | Mod: TC

## 2024-12-18 NOTE — PROGRESS NOTES
HPI/CC  Ms. Nelida Nicholas is referred for evaluation and management of osteoporosis by SERGEI Willams.  Pt had been diagnosed with osteoporosis and received Prolia injections from Dr. Talbot , then pt was referred to  Ms. Arreaga for evaluation of osteoporosis with continuation of Prolia injection. Most recent injection was 3/4/2024.   Pt is here to establish provider for osteoporosis management.    She is accompanied by her brother Mejia Arizmendi    Lab reviewed:  12/20/2024  Calcium 9.2    Medical/surgical history:  She is treated for Type 2 Diabetes Mellitus requiring insulin, DJC, migraine, ASCVD, hyperlipidemia, hypertension, macrocytic anemia, hypothyroidism, scoliosis and back pain.  Surgical history includes CABG in 1997    Falls:  History of falls without fracture    DXA Scan  I reviewed images and report of DXA scan performed at [facility] on 8/7/2023     T-scores femoral neck -1.5, total hip-0.3, total spine  +1.6.  Patient has previous diagnosis of osteoporosis by Dr. Talbot and has been treated with Prolia.    FRAX Score  10 year probability Major Osteoporotic Fracture 15%  10 year probability Hip Fracture 1.9%   The intervention threshold is 20% for Major Osteoporotic Fracture or 3% for Hip Fracture     Fracture history       Personal   reports fractures in adulthood       Family       no known history hip fracture in parents    Calcium and Vit D  Takes calcium and Vit D chewable      Weight Bearing Exercise/ Mobility  Walks in her home.  Needs assistance from her brother because of unsteady gait and vision issues    Dental:  No invasive dental procedures anticipated    Risk factors for osteoporosis/fracture  Positive for  Postmenopausal  female;  nonsurgical menopause  Limited physical activity  Negative for   Does not smoke cigarettes  Does not drink alcohol in excess  No history prolonged steroids    Review of Systems   Constitutional: Negative for fever, chills, malaise.    Respiratory:  Tilt Table Test/Syncope discharge instructions     After Your Test  Any medications used during the test should leave your system within 15 minutes.   You’re likely to be sent home right away. It’s a good idea to have a friend or family member drive.     Activity Restrictions  Driving restriction is per your Doctors recommendation    Home Treatment  If you have had episodes of syncope, your healthcare provider may tell you how to help prevent fainting. These might include:  Stopping or adjusting medicines that lower blood pressure, such as diuretics  Eating foods with more salt, to help keep up blood volume   Drinking plenty of fluids, to maintain blood volume   Doing moderate exercise such as lower leg strength training  Wearing support stockings to prevent blood pooling in the legs while standing     Avoiding triggers  Prolonged standing ,  too much heat,  intense emotion,   pain, the sight of blood or a needle, exercising for a long time, dehydration   Know The Warning Signs  Nausea, warm and flushed feeling, paleness, sweaty palms, dizziness,  blurred vision, racing heart beat   If you think you are about to faint, try one or more of these tips  Lie down right away.  Prop your feet up so that they are higher than your head, tense the muscles in your arms, make tight fists, flex your legs and pump your feet or cross your legs.  If you faint, once you regain consciousness you should rest for a little while before moving around again.    Follow up:  Follow up as directed by your doctor. If you have more episodes of near syncope or syncope call and inform your doctor     Negative for cough and shortness of breath.    Cardiovascular: Negative for chest pain and palpitations.   Gastrointestinal: Negative for change in bowel habit, constipation   Neurological: Negative for dizziness, syncope, weakness and light-headedness.   Musculoskeletal:    Unsteady gait.  Takes small steps    Physical Exam  Constitutional:       Appearance: In no distress   HENT:      Mouth/Throat:      Mouth: Mucous membranes are moist.      Dental:  No dental issues at present  Eyes:      Conjunctiva/sclera: Conjunctivae normal.   Cardiovascular:      Rate and Rhythm: Normal rate and regular rhythm.   Pulmonary:      Effort: Pulmonary effort is normal.   Musculoskeletal:      Cervical back: Normal range of motion.      Thoracic back: Mild kyphosis     Lumbar back: Normal range of motion. No scoliosis     Gait: Is in wheelchair today. Walks around her home with assistance from brother  Skin:     General: Skin is warm and dry.   Neurological:      Mental Status: She is alert and oriented to person, place, and time.     Assessment:       1. Postmenopausal osteoporosis       Plan:   I reviewed the images and report of DXA scan.  Reviewed personal risk factors for osteoporosis  Offered overview of role of calcium and Vit D in bone health.  Reviewed falls precautions.  Advised use of assistive devices as required.  Pt attempts to walk in her home.  Reviewed role of anabolic and antiresorptive medications, including literature on risks and benefits of treatment vs non treatment with medications  Reviewed expected benefits and possible serious side effects of Prolia.  Possible serious side effects include low calcium, severe jaw bone problems, unusual thigh bone fractures, serious infections, and skin problems.  Printed literature provided. Informed that calcium will be checked within 2 weeks of each Prolia injection.  Lab: calcium within 2 weeks of next Prolia  7.   Return 6 months for Prolia injection

## 2024-12-20 ENCOUNTER — OFFICE VISIT (OUTPATIENT)
Dept: INTERNAL MEDICINE | Facility: CLINIC | Age: 69
End: 2024-12-20
Payer: MEDICARE

## 2024-12-20 VITALS — HEART RATE: 76 BPM | OXYGEN SATURATION: 97 % | DIASTOLIC BLOOD PRESSURE: 80 MMHG | SYSTOLIC BLOOD PRESSURE: 132 MMHG

## 2024-12-20 DIAGNOSIS — M81.0 OSTEOPOROSIS, UNSPECIFIED OSTEOPOROSIS TYPE, UNSPECIFIED PATHOLOGICAL FRACTURE PRESENCE: Primary | ICD-10-CM

## 2024-12-20 DIAGNOSIS — M81.0 POSTMENOPAUSAL OSTEOPOROSIS: Primary | ICD-10-CM

## 2024-12-20 DIAGNOSIS — M81.0 OSTEOPOROSIS, UNSPECIFIED OSTEOPOROSIS TYPE, UNSPECIFIED PATHOLOGICAL FRACTURE PRESENCE: ICD-10-CM

## 2024-12-20 PROCEDURE — 99999 PR PBB SHADOW E&M-EST. PATIENT-LVL V: CPT | Mod: PBBFAC,,, | Performed by: NURSE PRACTITIONER

## 2024-12-20 PROCEDURE — 99999PBSHW PR PBB SHADOW TECHNICAL ONLY FILED TO HB: Mod: JZ,PBBFAC,,

## 2024-12-20 PROCEDURE — 99214 OFFICE O/P EST MOD 30 MIN: CPT | Mod: S$PBB,25,, | Performed by: NURSE PRACTITIONER

## 2024-12-20 PROCEDURE — 96372 THER/PROPH/DIAG INJ SC/IM: CPT | Mod: PBBFAC | Performed by: NURSE PRACTITIONER

## 2024-12-20 PROCEDURE — 99215 OFFICE O/P EST HI 40 MIN: CPT | Mod: PBBFAC | Performed by: NURSE PRACTITIONER

## 2024-12-20 RX ORDER — RIZATRIPTAN BENZOATE 10 MG/1
TABLET, ORALLY DISINTEGRATING ORAL
COMMUNITY

## 2024-12-20 RX ORDER — VIT C/E/ZN/COPPR/LUTEIN/ZEAXAN 250MG-90MG
1 CAPSULE ORAL
COMMUNITY

## 2024-12-20 RX ORDER — NYSTATIN 100000 [USP'U]/G
POWDER TOPICAL
COMMUNITY
Start: 2024-07-10

## 2024-12-20 RX ORDER — INSULIN ASPART 100 [IU]/ML
INJECTION, SOLUTION INTRAVENOUS; SUBCUTANEOUS
COMMUNITY
Start: 2024-05-17

## 2024-12-20 RX ORDER — CALCIUM CARB/VITAMIN D3/VIT K1 650MG-12.5
TABLET,CHEWABLE ORAL
COMMUNITY

## 2024-12-20 RX ORDER — FLUTICASONE FUROATE 27.5 UG/1
SPRAY, METERED NASAL
COMMUNITY

## 2024-12-20 RX ORDER — LOSARTAN POTASSIUM 50 MG/1
50 TABLET ORAL
COMMUNITY

## 2024-12-20 RX ORDER — TRIAMCINOLONE ACETONIDE 5 MG/G
CREAM TOPICAL
COMMUNITY
Start: 2024-07-10

## 2024-12-20 RX ORDER — MULTIVITAMIN
1 TABLET ORAL
COMMUNITY

## 2024-12-20 RX ORDER — PREDNISOLONE ACETATE 10 MG/ML
SUSPENSION/ DROPS OPHTHALMIC
COMMUNITY
Start: 2024-07-10

## 2024-12-20 RX ORDER — POVIDONE 50 MG/10ML
SOLUTION/ DROPS OPHTHALMIC
COMMUNITY

## 2024-12-20 RX ADMIN — DENOSUMAB 60 MG: 60 INJECTION SUBCUTANEOUS at 02:12

## 2025-01-08 ENCOUNTER — DOCUMENT SCAN (OUTPATIENT)
Dept: HOME HEALTH SERVICES | Facility: HOSPITAL | Age: 70
End: 2025-01-08
Payer: MEDICARE

## 2025-01-28 ENCOUNTER — OFFICE VISIT (OUTPATIENT)
Dept: FAMILY MEDICINE | Facility: CLINIC | Age: 70
End: 2025-01-28
Payer: MEDICARE

## 2025-01-28 VITALS
DIASTOLIC BLOOD PRESSURE: 60 MMHG | HEART RATE: 72 BPM | HEIGHT: 59 IN | OXYGEN SATURATION: 99 % | WEIGHT: 146.88 LBS | BODY MASS INDEX: 29.61 KG/M2 | TEMPERATURE: 97 F | RESPIRATION RATE: 16 BRPM | SYSTOLIC BLOOD PRESSURE: 112 MMHG

## 2025-01-28 DIAGNOSIS — D53.9 MACROCYTIC ANEMIA: ICD-10-CM

## 2025-01-28 DIAGNOSIS — N76.3 CHRONIC VULVITIS: ICD-10-CM

## 2025-01-28 DIAGNOSIS — G62.9 NEUROPATHY: ICD-10-CM

## 2025-01-28 DIAGNOSIS — E78.5 HYPERLIPIDEMIA, UNSPECIFIED HYPERLIPIDEMIA TYPE: ICD-10-CM

## 2025-01-28 DIAGNOSIS — G89.4 CHRONIC PAIN SYNDROME: ICD-10-CM

## 2025-01-28 DIAGNOSIS — I25.118 ATHEROSCLEROTIC HEART DISEASE OF NATIVE CORONARY ARTERY WITH OTHER FORMS OF ANGINA PECTORIS: ICD-10-CM

## 2025-01-28 DIAGNOSIS — R32 URINARY INCONTINENCE, UNSPECIFIED TYPE: ICD-10-CM

## 2025-01-28 DIAGNOSIS — I10 PRIMARY HYPERTENSION: Chronic | ICD-10-CM

## 2025-01-28 DIAGNOSIS — G43.009 MIGRAINE WITHOUT AURA AND WITHOUT STATUS MIGRAINOSUS, NOT INTRACTABLE: Chronic | ICD-10-CM

## 2025-01-28 DIAGNOSIS — Z09 FOLLOW-UP EXAM: Primary | ICD-10-CM

## 2025-01-28 DIAGNOSIS — E03.9 ACQUIRED HYPOTHYROIDISM: Chronic | ICD-10-CM

## 2025-01-28 DIAGNOSIS — G24.01 TARDIVE DYSKINESIA: ICD-10-CM

## 2025-01-28 DIAGNOSIS — E10.649 TYPE 1 DIABETES MELLITUS WITH HYPOGLYCEMIA AND WITHOUT COMA: ICD-10-CM

## 2025-01-28 DIAGNOSIS — Z76.0 MEDICATION REFILL: ICD-10-CM

## 2025-01-28 DIAGNOSIS — R74.8 ELEVATED LIVER ENZYMES: ICD-10-CM

## 2025-01-28 PROCEDURE — 99215 OFFICE O/P EST HI 40 MIN: CPT | Mod: ,,, | Performed by: INTERNAL MEDICINE

## 2025-01-28 RX ORDER — SILVER SULFADIAZINE 10 G/1000G
CREAM TOPICAL 2 TIMES DAILY
Qty: 85 G | Refills: 2 | Status: SHIPPED | OUTPATIENT
Start: 2025-01-28

## 2025-01-28 RX ORDER — NITROGLYCERIN 0.4 MG/1
0.4 TABLET SUBLINGUAL EVERY 5 MIN PRN
Qty: 30 TABLET | Refills: 2 | Status: SHIPPED | OUTPATIENT
Start: 2025-01-28

## 2025-01-28 RX ORDER — OXYBUTYNIN CHLORIDE 10 MG/1
10 TABLET, EXTENDED RELEASE ORAL DAILY
Qty: 90 TABLET | Refills: 1 | Status: SHIPPED | OUTPATIENT
Start: 2025-01-28 | End: 2026-01-28

## 2025-01-28 NOTE — PROGRESS NOTES
Subjective     Patient ID: Nelida Nicholas is a 69 y.o. female.    Chief Complaint: Follow-up and Urinary Incontinence    Mrs. Nicholas is a 69-year-old female the presents today for follow-up  She has a past medical history of diabetes mellitus type 1 for the last 54 years, essential hypertension, depression, dyslipidemia, neuropathy, IBS, chronic pain syndrome, hypothyroidism, and osteoporosis.  She has a history of proliferative retinopathy and is legally blind.  She is up-to-date on her eye exams and follows with a retina specialist every 4 months.  Blood pressure today is well over 60.  She also has a past medical history of coronary artery disease and had triple bypass surgery in 1999.  She follows with Dr. Hammond at [facility].  She has had some chest pain recently that is a little right of center.  She denies aggravating factors.  She has not taken any nitroglycerin.  She has a follow-up with Cardiology next week.  She is unable to tolerate a statin.  She is on Zetia.  She is up-to-date on most age-appropriate health screenings.  Home health has been coming out to help manage a wound on her bottom.  She states that it improved..  She continues with insulin pump.  Most recent A1c 6.5%.  She is having issues with urinary incontinence.  She is on Myrbetriq but does not feel like it works.  She has never seen Urology before.  Currently resting comfortably in no distress.  She is afebrile and vital signs are stable.    Follow-up  Pertinent negatives include no abdominal pain, arthralgias, chest pain, chills, congestion, coughing, fatigue, fever, headaches, joint swelling, myalgias, nausea, neck pain, rash, sore throat or weakness.     Review of Systems   Constitutional:  Negative for appetite change, chills, fatigue and fever.   HENT:  Negative for nasal congestion, ear pain, hearing loss, sinus pressure/congestion and sore throat.    Eyes:  Negative for pain, redness and visual disturbance.   Respiratory:  Negative  for apnea, cough, shortness of breath and wheezing.    Cardiovascular:  Negative for chest pain and palpitations.   Gastrointestinal:  Negative for abdominal pain, constipation, diarrhea and nausea.   Endocrine: Negative for cold intolerance, heat intolerance and polyuria.   Genitourinary:  Positive for bladder incontinence. Negative for dysuria and hematuria.   Musculoskeletal:  Positive for back pain. Negative for arthralgias, joint swelling, myalgias and neck pain.   Integumentary:  Positive for wound. Negative for pallor and rash.   Allergic/Immunologic: Negative for immunocompromised state.   Neurological:  Negative for tremors, seizures, weakness, headaches and memory loss.   Hematological:  Negative for adenopathy.   Psychiatric/Behavioral:  Negative for confusion, dysphoric mood and sleep disturbance. The patient is not nervous/anxious.           Objective     Physical Exam  Vitals and nursing note reviewed.   Constitutional:       General: She is not in acute distress.     Appearance: Normal appearance. She is not ill-appearing.   HENT:      Head: Normocephalic and atraumatic.      Right Ear: External ear normal.      Left Ear: External ear normal.      Nose: Nose normal.      Mouth/Throat:      Pharynx: Oropharynx is clear.   Eyes:      Extraocular Movements: Extraocular movements intact.      Conjunctiva/sclera: Conjunctivae normal.      Pupils: Pupils are equal, round, and reactive to light.   Neck:      Vascular: No carotid bruit.   Cardiovascular:      Rate and Rhythm: Normal rate and regular rhythm.      Pulses: Normal pulses.      Heart sounds: Normal heart sounds. No murmur heard.  Pulmonary:      Effort: No respiratory distress.      Breath sounds: Normal breath sounds. No wheezing or rales.   Abdominal:      General: Bowel sounds are normal.      Palpations: Abdomen is soft.   Musculoskeletal:         General: Normal range of motion.      Cervical back: Normal range of motion and neck supple.       Right lower leg: No edema.      Left lower leg: No edema.   Skin:     General: Skin is warm and dry.      Capillary Refill: Capillary refill takes less than 2 seconds.      Coloration: Skin is not pale.      Findings: Lesion present.      Comments: Wound on buttocks.  Erythematous with scab.  No signs of infection no drainage   Neurological:      General: No focal deficit present.      Mental Status: She is alert and oriented to person, place, and time.      Cranial Nerves: No cranial nerve deficit.      Sensory: Sensory deficit present.      Motor: No weakness.   Psychiatric:         Mood and Affect: Mood normal.         Judgment: Judgment normal.            Assessment and Plan     1. Follow-up exam    2. Migraine without aura and without status migrainosus, not intractable    3. Chronic pain syndrome    4. Tardive dyskinesia    5. Neuropathy    6. Chronic vulvitis  -     silver sulfADIAZINE 1% (SILVADENE) 1 % cream; Apply topically 2 (two) times daily.  Dispense: 85 g; Refill: 2    7. Medication refill  -     silver sulfADIAZINE 1% (SILVADENE) 1 % cream; Apply topically 2 (two) times daily.  Dispense: 85 g; Refill: 2    8. Urinary incontinence, unspecified type  -     Ambulatory referral/consult to Urology; Future; Expected date: 02/04/2025    9. Primary hypertension    10. Atherosclerotic heart disease of native coronary artery with other forms of angina pectoris    11. Hyperlipidemia, unspecified hyperlipidemia type    12. Macrocytic anemia    13. Acquired hypothyroidism    14. Type 1 diabetes mellitus with hypoglycemia and without coma    15. Elevated liver enzymes    Other orders  -     nitroGLYCERIN (NITROSTAT) 0.4 MG SL tablet; Place 1 tablet (0.4 mg total) under the tongue every 5 (five) minutes as needed for Chest pain.  Dispense: 30 tablet; Refill: 2  -     oxybutynin (DITROPAN-XL) 10 MG 24 hr tablet; Take 1 tablet (10 mg total) by mouth once daily.  Dispense: 90 tablet; Refill: 1        1. Patient  presents today for follow-up.  She is up-to-date on most age-appropriate health screenings.  Eye exam every 4 months with a retina specialist.  Vaccines are up-to-date.  She has done them through her pharmacy.      2. Diabetes mellitus type 1-she has an insulin pump.  She has been a diabetic for 54 years.  Complications include neuropathy and proliferative retinopathy.  She follows with endocrinology here in [facility].  Recent A1c 6.5%.  Eye exam is up-to-date    3. Essential hypertension-blood pressure 112/60.  Her target is less than 130/80.  She is on amlodipine 5 mg daily, carvedilol ., losartan 100 mg daily last creatinine in February of this year was 0.92    4. Dyslipidemia-she is unable to tolerate statins.  She is on Zetia    5. Coronary artery disease-history of bypass surgery in 1999.  She is on aspirin, beta-blocker, angiotensin receptor blocker.  She has had some chest pain recently.  Right of center and different than her typical pain.  She has not tried a nitroglycerin.  She does have a follow-up with [facility] next week.  She follows with Silvana Hammond at [facility]    6. Chronic pain syndrome-she follows with pain management    7. Neuropathy-related to 2.  She takes gabapentin and she is also on Cymbalta    8. Depression-history of severe depression in the past.  She states that since she started the Rexulti she is doing much better.    9. Hypothyroidism-clinically she appears euthyroid.  She is on 150 mcg of Synthroid    10. Wound-to buttocks.  She has been dealing with this off and on for the last 3 years.  Home health is coming out and they have been helping.      11. Urinary incontinence-Myrbetriq is not really helping.  We are going to try Ditropan and make a referral to Urology    Billing for this encounters based on high medical complexity         Follow up in about 4 months (around 5/28/2025).

## 2025-01-30 ENCOUNTER — EXTERNAL HOME HEALTH (OUTPATIENT)
Dept: HOME HEALTH SERVICES | Facility: HOSPITAL | Age: 70
End: 2025-01-30
Payer: MEDICARE

## 2025-02-03 ENCOUNTER — DOCUMENT SCAN (OUTPATIENT)
Dept: HOME HEALTH SERVICES | Facility: HOSPITAL | Age: 70
End: 2025-02-03
Payer: MEDICARE

## 2025-02-04 ENCOUNTER — OFFICE VISIT (OUTPATIENT)
Dept: OBSTETRICS AND GYNECOLOGY | Facility: CLINIC | Age: 70
End: 2025-02-04
Payer: MEDICARE

## 2025-02-04 VITALS
RESPIRATION RATE: 19 BRPM | HEART RATE: 70 BPM | HEIGHT: 59 IN | BODY MASS INDEX: 29.43 KG/M2 | SYSTOLIC BLOOD PRESSURE: 110 MMHG | WEIGHT: 146 LBS | OXYGEN SATURATION: 100 % | DIASTOLIC BLOOD PRESSURE: 70 MMHG

## 2025-02-04 DIAGNOSIS — B37.2 CANDIDAL SKIN INFECTION: Primary | ICD-10-CM

## 2025-02-04 DIAGNOSIS — R32 URINARY INCONTINENCE, UNSPECIFIED TYPE: ICD-10-CM

## 2025-02-04 PROCEDURE — 99999 PR PBB SHADOW E&M-EST. PATIENT-LVL V: CPT | Mod: PBBFAC,,, | Performed by: ADVANCED PRACTICE MIDWIFE

## 2025-02-04 PROCEDURE — 99203 OFFICE O/P NEW LOW 30 MIN: CPT | Mod: S$PBB,,, | Performed by: ADVANCED PRACTICE MIDWIFE

## 2025-02-04 PROCEDURE — 99215 OFFICE O/P EST HI 40 MIN: CPT | Mod: PBBFAC | Performed by: ADVANCED PRACTICE MIDWIFE

## 2025-02-04 RX ORDER — CLOTRIMAZOLE 1 %
CREAM (GRAM) TOPICAL 2 TIMES DAILY
Qty: 45 G | Refills: 11 | Status: SHIPPED | OUTPATIENT
Start: 2025-02-04

## 2025-02-04 NOTE — PROGRESS NOTES
"Subjective     Patient ID: Nelida Nicholas is a 69 y.o. female.    Chief Complaint: Gynecologic Exam (Stated that she has a vaginal rash.)    C/O vaginal rash, irritation and "bumps".  Rash gets better when using SSD cream regularly but it has gotten worse over the past several days.   Wears depends and has urinary incontinence.  Has a pressure type skin wound on sacral area.  Pt has home health nurse that comes and manages this.  They are planning to consult wound care.       Gynecologic Exam  The patient's pertinent negatives include no pelvic pain or vaginal discharge. Associated symptoms include rash.     Review of Systems   Constitutional: Negative.    HENT: Negative.     Respiratory: Negative.     Cardiovascular: Negative.    Genitourinary:  Positive for bladder incontinence, genital sores and vaginal pain. Negative for pelvic pain, vaginal bleeding and vaginal discharge.   Musculoskeletal:  Positive for gait problem.   Integumentary:  Positive for rash.   Allergic/Immunologic: Negative.    Neurological:  Positive for tremors.     Past Medical History:   Diagnosis Date    Allergic rhinitis     Back pain, thoracic     Brittle diabetes     Carotid bruit     right    Chronic anemia     Chronic major depressive disorder, recurrent episode     Chronic migraine w/o aura, not intractable, w/o stat migr     Chronic pain syndrome     Constipation     [place] or callus     Coronary arteriosclerosis     S/P CABBAGE 1997    Degenerative joint disease involving multiple joints     Diabetes mellitus type I     Elevated levels of transaminase & lactic acid dehydrogenase     Encounter for long-term (current) use of insulin     Encounter for long-term (current) use of other medications     Epigastric pain     Fatigue     GERD (gastroesophageal reflux disease)     Hypercalcemia     Hyperkalemia     Hyperlipidemia     Hypo-osmolar hyponatremia     Hypothyroidism     Lumbosacral spondylosis with myelopathy     Memory loss     " "Osteoporosis     Restless legs syndrome (RLS)     Scoliosis deformity of spine     Sleep disorder     Urinary incontinence     Vitamin D deficiency      Past Surgical History:   Procedure Laterality Date    ARTERIAL DOPPLER  2017    ARTERIAL DOPPLER DUPLEX Bilateral 2017    LEGS    BREAST BIOPSY      BREAST SURGERY      BYPASS GRAFT N/A     tripple bypass surgery    CAROTID DOPPLER  2017    COMPLETE    CARPAL TUNNEL RELEASE      CHOLECYSTECTOMY      CORONARY ARTERY BYPASS GRAFT  1997    3 VESSLE BYPASS    ECHOCARDIOGRAPHY  2012    with doppler color flow    ESOPHAGOGASTRODUODENOSCOPY  2011    DIL AND BX    FINGER SURGERY      Trigger finger release all 10 fingers    NECK SURGERY      REDUCTION OF BOTH BREASTS      SHOULDER SURGERY      TOTAL REDUCTION MAMMOPLASTY      TUBAL LIGATION        OB History    Para Term  AB Living   0 0 0         SAB IAB Ectopic Multiple Live Births                  Current Outpatient Medications   Medication Instructions    amLODIPine (NORVASC) 5 mg, Oral, Daily    aspirin (ECOTRIN) 81 mg, Daily    azelastine (ASTELIN) 137 mcg (0.1 %) nasal spray INHALE 1 OR 2 SPRAYS IN EACH NOSTRIL TWICE DAILY AS NEEDED ..SHAKE GENTLY BEFORE USE..    brimonidine 0.2% (ALPHAGAN) 0.2 % Drop PLACE 1 DROP INTO BOTH EYES 3 TIMES A DAY AS DIRECTED    calcium-vitamin D3-vitamin K 650 mg-12.5 mcg-40 mcg Chew     carvediloL (COREG) 25 MG tablet TAKE 1 TABLET BY MOUTH TWICE DAILY FOR BLOOD PRESSURE &/OR HEART "TAKE WITH FOOD"    cetirizine (ZYRTEC) 10 mg, Oral, Daily    cholecalciferol, vitamin D3, (VITAMIN D3) 25 mcg (1,000 unit) capsule 1 tablet    clotrimazole (LOTRIMIN) 1 % cream Topical (Top), 2 times daily, Twice daily for 7 days then as needed    colesevelam (WELCHOL) 625 mg, Oral, 3 times daily, One tab TID    DULoxetine (CYMBALTA) 60 mg, Oral, Daily    ezetimibe (ZETIA) 10 mg, Oral, Daily    flash glucose scanning reader ([employer] GUICHO 2 READER) Misc Use " to test BG    flash glucose sensor (FREESTYLE GUICHO 2 SENSOR) Kit Use as directed    fluorometholone 0.1% (FML) 0.1 % DrpS Place into both eyes.    fluticasone (FLONASE SENSIMIST) 27.5 mcg/actuation nasal spray     fluticasone propionate (FLONASE) 100 mcg, Each Nostril, Daily    gabapentin (NEURONTIN) 100 MG capsule TAKE 1 CAPSULE BY MOUTH TWICE DAILY *AVOID ALCOHOL *CAUSES DROWSINESS*    gabapentin (NEURONTIN) 800 mg, Oral, Nightly    HYDROcodone-acetaminophen (NORCO) 7.5-325 mg per tablet 1 tablet, Oral, Every 6 hours PRN    HYDROcodone-acetaminophen (NORCO) 7.5-325 mg per tablet 1 tablet, Oral, Every 6 hours PRN    HYDROcodone-acetaminophen (NORCO) 7.5-325 mg per tablet 1 tablet, Oral, Every 6 hours PRN    hydrOXYzine pamoate (VISTARIL) 25 MG Cap TAKE 1 CAPSULE BY MOUTH EVERY 8 HOURS AS NEEDED (ITCHING, ANXIETY, SLEEP) *AVOID ALCOHOL *CAUSES DROWSINESS*    insulin aspart U-100 (NOVOLOG U-100 INSULIN ASPART) 100 unit/mL injection Inject into the skin.    insulin aspart, niacinamide, (FIASP U-100 INSULIN) 100 unit/mL Soln USE TO INJECT UP     UNITS PER INSULIN PUMP     DAILY    insulin pump cart,cont BT-cntr (OMNIPOD DASH INTRO KIT, GEN 4,) Crtg 1 each, Subcutaneous, Daily    insulin pump cart,cont inf,BT (OMNIPOD DASH PODS, GEN 4,) Crtg 1 each, Subcutaneous, Every 3 days    ketorolac 0.5% (ACULAR) 0.5 % Drop Place into both eyes.    latanoprost 0.005 % ophthalmic solution     LINZESS 72 mcg, Oral, Daily    losartan (COZAAR) 50 mg, Daily    meloxicam (MOBIC) 7.5 mg, Oral, Daily    montelukast (SINGULAIR) 10 mg tablet TAKE 1 TABLET BY MOUTH EVERY EVENING FOR breathing allergies    multivitamin (THERAGRAN) per tablet 1 tablet    nitroGLYCERIN (NITROSTAT) 0.4 mg, Sublingual, Every 5 min PRN    oxybutynin (DITROPAN-XL) 10 mg, Oral, Daily    povidone, PF, (IVIZIA, PF,) 0.5 % Drop 1 drop per eye every hour PRN    REXULTI 2 mg, Oral    rizatriptan (MAXALT-MLT) 10 MG disintegrating tablet Place under the tongue.     silver sulfADIAZINE 1% (SILVADENE) 1 % cream Topical (Top), 2 times daily    subcutaneous insulin pump (T:SLIM G4 INSULIN PUMP MISC) by Misc.(Non-Drug; Combo Route) route.    SYNTHROID 150 mcg, Oral, Before breakfast    tiZANidine (ZANAFLEX) 4 MG tablet TAKE 1 TABLET (4 MG) BY MOUTH TWICE DAILY AS NEEDED FOR MUSCLE SPASMS    traZODone (DESYREL) 150 MG tablet TAKE 1 TABLET BY MOUTH EVERY EVENING ..MAY CAUSE DROWSINESS    vit C,V-Sg-mcgok-lutein-zeaxan (PRESERVISION AREDS-2) 250-90-40-1 mg Cap 1 capsule       Objective   Vitals:    02/04/25 1457   BP: 110/70   Pulse: 70   Resp: 19     Wt Readings from Last 2 Encounters:   02/04/25 66.2 kg (146 lb)   01/28/25 66.6 kg (146 lb 14.4 oz)      Physical Exam  Vitals reviewed. Exam conducted with a chaperone present.   Constitutional:       Appearance: Normal appearance.   HENT:      Head: Normocephalic.   Cardiovascular:      Rate and Rhythm: Normal rate.   Pulmonary:      Effort: Pulmonary effort is normal.   Abdominal:      General: Abdomen is flat.      Palpations: Abdomen is soft.   Genitourinary:     Labia:         Right: Rash present.         Left: Rash present.       Comments: Bilateral labial Erythema  Musculoskeletal:         General: Normal range of motion.      Cervical back: Normal range of motion.   Skin:     General: Skin is warm and dry.   Neurological:      Mental Status: She is alert and oriented to person, place, and time.   Psychiatric:         Mood and Affect: Mood normal.         Behavior: Behavior normal.        Assessment and Plan   1. Candidal skin infection  -     clotrimazole (LOTRIMIN) 1 % cream; Apply topically 2 (two) times daily. Twice daily for 7 days then as needed  Dispense: 45 g; Refill: 11    2. Urinary incontinence, unspecified type    RX Clotrimazole  sent to pharmacy   Use as directed twice daily for 7 days, then as needed.  Continue SSD cream throughout the day for skin protection    Follow up if symptoms worsen or fail to improve.

## 2025-02-06 ENCOUNTER — TELEPHONE (OUTPATIENT)
Dept: FAMILY MEDICINE | Facility: CLINIC | Age: 70
End: 2025-02-06
Payer: MEDICARE

## 2025-02-06 RX ORDER — HYDROXYZINE PAMOATE 25 MG/1
CAPSULE ORAL
Qty: 90 CAPSULE | Refills: 5 | Status: SHIPPED | OUTPATIENT
Start: 2025-02-06

## 2025-02-06 NOTE — TELEPHONE ENCOUNTER
----- Message from Lea sent at 2/6/2025 10:48 AM CST -----  Who Called: Nelida Nicholas    Refill or New Rx:Refill  RX Name and Strength: hydrOXYzine pamoate (VISTARIL) 25 MG Cap  How is the patient currently taking it? (ex. 1XDay): TAKE 1 CAPSULE BY MOUTH EVERY 8 HOURS AS NEEDED  Is this a 30 day or 90 day RX: 90 day   Local or Mail Order: local   List of preferred pharmacies on file (remove unneeded): [facility] - [place], [place] - [address]   [address] [place] [address] [place] [address]  Phone: [phone] Fax: [phone]      Ordering Provider: Dr. Coombs       Preferred Method of Contact: Phone Call  Patient's Preferred Phone Number on File: [phone]     Additional Information:  pt needs medication refilled. Please contact pt for more info

## 2025-02-06 NOTE — TELEPHONE ENCOUNTER
Called to inform pt that this rx was being sent in already due to pharmacy sending over a request. No answer. Vm left explaining this.

## 2025-02-10 ENCOUNTER — PATIENT MESSAGE (OUTPATIENT)
Dept: PAIN MEDICINE | Facility: CLINIC | Age: 70
End: 2025-02-10
Payer: MEDICARE

## 2025-02-10 ENCOUNTER — OFFICE VISIT (OUTPATIENT)
Dept: PAIN MEDICINE | Facility: CLINIC | Age: 70
End: 2025-02-10
Payer: MEDICARE

## 2025-02-10 VITALS
BODY MASS INDEX: 29.23 KG/M2 | SYSTOLIC BLOOD PRESSURE: 110 MMHG | DIASTOLIC BLOOD PRESSURE: 49 MMHG | WEIGHT: 145 LBS | RESPIRATION RATE: 18 BRPM | HEIGHT: 59 IN | HEART RATE: 60 BPM

## 2025-02-10 DIAGNOSIS — Z79.899 ENCOUNTER FOR LONG-TERM (CURRENT) USE OF MEDICATIONS: ICD-10-CM

## 2025-02-10 DIAGNOSIS — R32 URINARY INCONTINENCE, UNSPECIFIED TYPE: Primary | ICD-10-CM

## 2025-02-10 DIAGNOSIS — G89.4 CHRONIC PAIN SYNDROME: Chronic | ICD-10-CM

## 2025-02-10 DIAGNOSIS — M54.17 LUMBOSACRAL RADICULOPATHY: Primary | Chronic | ICD-10-CM

## 2025-02-10 DIAGNOSIS — M96.1 POSTLAMINECTOMY SYNDROME, LUMBAR: Chronic | ICD-10-CM

## 2025-02-10 PROCEDURE — 99999PBSHW POCT URINE DRUG SCREEN PRESUMP: Mod: PBBFAC,,,

## 2025-02-10 PROCEDURE — 99999 PR PBB SHADOW E&M-EST. PATIENT-LVL V: CPT | Mod: PBBFAC,,, | Performed by: PAIN MEDICINE

## 2025-02-10 PROCEDURE — 99215 OFFICE O/P EST HI 40 MIN: CPT | Mod: PBBFAC | Performed by: PAIN MEDICINE

## 2025-02-10 PROCEDURE — 80305 DRUG TEST PRSMV DIR OPT OBS: CPT | Mod: PBBFAC | Performed by: PAIN MEDICINE

## 2025-02-10 PROCEDURE — 99214 OFFICE O/P EST MOD 30 MIN: CPT | Mod: S$PBB,,, | Performed by: PAIN MEDICINE

## 2025-02-10 RX ORDER — GABAPENTIN 800 MG/1
800 TABLET ORAL 3 TIMES DAILY
Qty: 90 TABLET | Refills: 11 | Status: SHIPPED | OUTPATIENT
Start: 2025-02-10 | End: 2026-02-10

## 2025-02-10 RX ORDER — HYDROCODONE BITARTRATE AND ACETAMINOPHEN 7.5; 325 MG/1; MG/1
1 TABLET ORAL EVERY 6 HOURS PRN
Qty: 120 TABLET | Refills: 0 | Status: SHIPPED | OUTPATIENT
Start: 2025-02-13 | End: 2025-03-15

## 2025-02-10 RX ORDER — HYDROCODONE BITARTRATE AND ACETAMINOPHEN 7.5; 325 MG/1; MG/1
1 TABLET ORAL EVERY 6 HOURS PRN
Qty: 120 TABLET | Refills: 0 | Status: SHIPPED | OUTPATIENT
Start: 2025-03-15 | End: 2025-04-14

## 2025-02-10 NOTE — PROGRESS NOTES
She Disclaimer: This note has been generated using voice-recognition software. There may be typographical errors that have been missed during proof-reading        Patient ID: Nelida Nicholas is a 69 y.o. female.      Chief Complaint: Mid-back Pain      69-year-old female returns for re-evaluation of chronic lower back and thoracic pain.  Her pain has progressively worsened although she has experienced some benefit with  home health physical therapy.  Her pain is primarily mid to lower back.  She is status post lumbar laminectomy greater than 20 years ago.  She received nerve block injections in the past by Dr. Razo without significant improvement.  She has been maintained on medication management and returns today for medication refill.  She declines nerve block injections or surgical re-evaluation.          Pain Assessment  Pain Assessment: 0-10  Pain Score:   5  Pain Location: Back  Pain Descriptors: Burning  Pain Frequency: Intermittent  Pain Onset: Awakened from sleep  Clinical Progression: Not changed  Aggravating Factors: Standing, Walking, Bending  Pain Intervention(s): Medication (See eMAR), Home medication, Heat applied      A's of Opioid Risk Assessment  Activity:Patient has difficulty performing  ADL.   Analgesia:Patients pain is partially controlled by current medication.   Adverse Effects: Patient denies constipation or sedation.  Aberrant Behavior:  reviewed with no aberrant drug seeking/taking behavior.      Patient denies any suicidal or homicidal ideations    Physical Therapy/Home Exercise:  Yes, remains in a home exercise program         Review of Systems   Constitutional: Negative.    HENT: Negative.     Eyes:  Positive for visual disturbance.   Respiratory: Negative.     Cardiovascular: Negative.    Gastrointestinal: Negative.    Endocrine: Negative.    Genitourinary: Negative.    Musculoskeletal:  Positive for arthralgias and back pain.   Integumentary:  Negative.   Neurological: Negative.     Hematological: Negative.    Psychiatric/Behavioral: Negative.               Past Medical History:   Diagnosis Date    Allergic rhinitis     Back pain, thoracic     Brittle diabetes     Carotid bruit     right    Chronic anemia     Chronic major depressive disorder, recurrent episode     Chronic migraine w/o aura, not intractable, w/o stat migr     Chronic pain syndrome     Constipation     [place] or callus     Coronary arteriosclerosis     S/P CABBAGE 1997    Degenerative joint disease involving multiple joints     Diabetes mellitus type I     Elevated levels of transaminase & lactic acid dehydrogenase     Encounter for long-term (current) use of insulin     Encounter for long-term (current) use of other medications     Epigastric pain     Fatigue     GERD (gastroesophageal reflux disease)     Hypercalcemia     Hyperkalemia     Hyperlipidemia     Hypo-osmolar hyponatremia     Hypothyroidism     Lumbosacral spondylosis with myelopathy     Memory loss     Osteoporosis     Restless legs syndrome (RLS)     Scoliosis deformity of spine     Sleep disorder     Urinary incontinence     Vitamin D deficiency      Past Surgical History:   Procedure Laterality Date    ARTERIAL DOPPLER  11/07/2017    ARTERIAL DOPPLER DUPLEX Bilateral 11/07/2017    LEGS    BREAST BIOPSY      BREAST SURGERY      BYPASS GRAFT N/A 1999    tripple bypass surgery    CAROTID DOPPLER  11/07/2017    COMPLETE    CARPAL TUNNEL RELEASE      CHOLECYSTECTOMY      CORONARY ARTERY BYPASS GRAFT  1997    3 VESSLE BYPASS    ECHOCARDIOGRAPHY  06/27/2012    with doppler color flow    ESOPHAGOGASTRODUODENOSCOPY  03/11/2011    DIL AND BX    FINGER SURGERY      Trigger finger release all 10 fingers    NECK SURGERY      REDUCTION OF BOTH BREASTS      SHOULDER SURGERY      TOTAL REDUCTION MAMMOPLASTY      TUBAL LIGATION       Social History     Socioeconomic History    Marital status:    Occupational History    Occupation: retired   Tobacco Use    Smoking  status: Former     Current packs/day: 0.00     Types: Cigarettes     Start date:      Quit date:      Years since quittin.1    Smokeless tobacco: Never    Tobacco comments:     1 pack per week   Substance and Sexual Activity    Alcohol use: Never    Drug use: Never    Sexual activity: Not Currently     Partners: Male     Birth control/protection: None     Family History   Problem Relation Name Age of Onset    Heart disease Paternal Grandfather      Heart disease Maternal Grandmother      Heart disease Father Andre Arizmendi     Hyperlipidemia Father Andre Arizmendi     Hypertension Father Andre Arizmendi     Heart failure Father Andre Arizmendi     Hypertension Mother Tiffanie Arizmendi     Osteoarthritis Mother Tiffanie Arizmendi     Migraines Mother Tiffanie Arizmendi     Rashes / Skin problems Mother Tiffanie Arizmendi     Hypertension Brother Mt Arizmendi     Diabetes Brother Mt Arizmendi     Rashes / Skin problems Brother Mt Arizmendi     Thyroid disease Brother Mt Arizmendi     Breast cancer Maternal Aunt Isela Marquez     Colon cancer Neg Hx      Ovarian cancer Neg Hx       Review of patient's allergies indicates:   Allergen Reactions    Klonopin [clonazepam]     Ketorolac      Other Reaction(s): Unknown    Meperidine      Other Reaction(s): Unknown    Nalbuphine      Other Reaction(s): Unknown    Opioids-meperidine and related     Statins-hmg-coa reductase inhibitors Other (See Comments)     Elevated Liver Enzymes     has a current medication list which includes the following prescription(s): amlodipine, aspirin, azelastine, brimonidine 0.2%, calcium-vitamin d3-vitamin k, carvedilol, cetirizine, cholecalciferol (vitamin d3), clotrimazole, colesevelam, duloxetine, ezetimibe, fluorometholone 0.1%, fluticasone propionate, hydrocodone-acetaminophen, fiasp u-100 insulin, linzess, losartan, meloxicam, montelukast, multivitamin, oxybutynin, ivizia (pf), rexulti, rizatriptan, silver sulfadiazine 1%, subcutaneous insulin pump,  synthroid, tizanidine, trazodone, preservision areds-2, freestyle clarissa 2 reader, freestyle clarissa 2 sensor, fluconazole, flonase sensimist, gabapentin, hydrocodone-acetaminophen, hydrocodone-acetaminophen, [START ON 2/13/2025] hydrocodone-acetaminophen, [START ON 3/15/2025] hydrocodone-acetaminophen, hydroxyzine pamoate, insulin aspart u-100, omnipod dash intro kit (gen 4), omnipod dash pods (gen 4), ketorolac 0.5%, latanoprost, and nitroglycerin, and the following Facility-Administered Medications: denosumab.      Objective:  Vitals:    02/10/25 1422   BP: (!) 110/49   Pulse: 60   Resp: 18        Physical Exam  Vitals and nursing note reviewed.   Constitutional:       General: She is not in acute distress.     Appearance: Normal appearance. She is not ill-appearing, toxic-appearing or diaphoretic.   HENT:      Head: Normocephalic and atraumatic.      Nose: Nose normal.      Mouth/Throat:      Mouth: Mucous membranes are moist.   Eyes:      General: Visual field deficit present.      Extraocular Movements: Extraocular movements intact.      Pupils: Pupils are equal, round, and reactive to light.   Cardiovascular:      Rate and Rhythm: Normal rate and regular rhythm.      Heart sounds: Normal heart sounds.   Pulmonary:      Effort: Pulmonary effort is normal. No respiratory distress.      Breath sounds: Normal breath sounds. No stridor. No wheezing or rhonchi.   Abdominal:      General: Bowel sounds are normal.      Palpations: Abdomen is soft.   Musculoskeletal:         General: No swelling or deformity.      Cervical back: Normal and normal range of motion. No spasms or tenderness. No pain with movement. Normal range of motion.      Thoracic back: Tenderness and bony tenderness present. Decreased range of motion.      Lumbar back: Tenderness and bony tenderness present. No spasms. Decreased range of motion. Negative right straight leg raise test and negative left straight leg raise test. No scoliosis.      Right  lower leg: No edema.      Left lower leg: No edema.      Comments: Lumbar pain with flexion, extension lateral rotation.  Bilateral lumbar spinous and facet tenderness to palpation   Skin:     General: Skin is warm.   Neurological:      General: No focal deficit present.      Mental Status: She is alert and oriented to person, place, and time. Mental status is at baseline.      Cranial Nerves: No cranial nerve deficit.      Sensory: Sensation is intact. No sensory deficit.      Motor: No weakness.      Coordination: Coordination normal.      Gait: Gait normal.      Deep Tendon Reflexes: Reflexes are normal and symmetric.      Reflex Scores:       Patellar reflexes are 2+ on the right side and 2+ on the left side.       Achilles reflexes are 2+ on the right side and 2+ on the left side.  Psychiatric:         Mood and Affect: Mood normal.         Behavior: Behavior normal.           Assessment:      1. Lumbosacral radiculopathy    2. Chronic pain syndrome    3. Encounter for long-term (current) use of medications    4. Postlaminectomy syndrome, lumbar          Plan:  1. reviewed  2.Addiction, Dependency, Tolerance, Opioid abuse-misuse, Death, Diversion Discussed. Overdose reversal drug Naloxone discussed. Patient is prescribed opiates for chronic nonmalignant pain pathology.  Patient is receiving opiates which require greater than a 72 hour supply of therapy.  Patient was educated on potential dependency associated with long-term opioid use as well as decreasing efficacy with prolonged use.  Patient was advised of risks, benefits and side effects and how to utilize each medication.  Patient was also informed that any deviation from therapy protocol will  lead to discontinuation of opiates.  It is reasonable to prescribe opioid analgesics for patient based on positive response to opioid medications, lack of side effects and  limited aberrant behavior.    3. Change in Neurontin to 800 mg t.i.d. and continue Norco as  previously prescribed       Requested Prescriptions     Signed Prescriptions Disp Refills    gabapentin (NEURONTIN) 800 MG tablet 90 tablet 11     Sig: Take 1 tablet (800 mg total) by mouth 3 (three) times daily.    HYDROcodone-acetaminophen (NORCO) 7.5-325 mg per tablet 120 tablet 0     Sig: Take 1 tablet by mouth every 6 (six) hours as needed for Pain.    HYDROcodone-acetaminophen (NORCO) 7.5-325 mg per tablet 120 tablet 0     Sig: Take 1 tablet by mouth every 6 (six) hours as needed for Pain.     4.Urine drug screen point of care obtained and consistent with prescribed medications and medication refill date.  Drug screen is to monitor compliance with prescribed opiates and to ensure that there was no misuse/abuse of other non-prescribed opioids or illicit drugs.  From this information I will determine if patient is suitable for opioid therapy    Orders Placed This Encounter   Procedures    POCT Urine Drug Screen ([facility])     Interpretive Information:     Negative:  No drug detected at the cut off level.   Positive:  This result represents presumptive positive for the   tested drug, other substances may yield a positive response other   than the analyte of interest. This result should be utilized for   diagnostic purpose only. Confirmation testing will be performed upon physician request only.         5. Continue home health physical therapy  6. Consider nerve block injections in the future for intractable pain  7.Follow with DANETTE Mosley in 2 months for re-evaluation and medication refill       report:  Reviewed and consistent with medication use as prescribed.      The total time spent for evaluation and management on 02/11/2025 including reviewing separately obtained history, performing a medically appropriate exam and evaluation, documenting clinical information in the health record, independently interpreting results and communicating them to the patient/family/caregiver, and ordering  medications/tests/procedures was between 15-29 minutes.    The above plan and management options were discussed at length with patient. Patient is in agreement with the above and verbalized understanding. It will be communicated with the referring physician via electronic record, fax, or mail.

## 2025-02-11 RX ORDER — FLUCONAZOLE 100 MG/1
100 TABLET ORAL DAILY
Qty: 1 TABLET | Refills: 1 | Status: SHIPPED | OUTPATIENT
Start: 2025-02-11

## 2025-02-12 RX ORDER — GABAPENTIN 100 MG/1
CAPSULE ORAL
Qty: 60 CAPSULE | Refills: 2 | OUTPATIENT
Start: 2025-02-12

## 2025-02-12 RX ORDER — BREXPIPRAZOLE 2 MG/1
1 TABLET ORAL DAILY
Qty: 30 TABLET | Refills: 2 | Status: SHIPPED | OUTPATIENT
Start: 2025-02-12

## 2025-02-12 RX ORDER — BREXPIPRAZOLE 2 MG/1
1 TABLET ORAL
Qty: 30 TABLET | Refills: 2 | OUTPATIENT
Start: 2025-02-12

## 2025-03-16 ENCOUNTER — DOCUMENT SCAN (OUTPATIENT)
Dept: HOME HEALTH SERVICES | Facility: HOSPITAL | Age: 70
End: 2025-03-16
Payer: MEDICARE

## 2025-03-19 DIAGNOSIS — K59.00 CONSTIPATION, UNSPECIFIED CONSTIPATION TYPE: ICD-10-CM

## 2025-03-19 DIAGNOSIS — G89.4 CHRONIC PAIN SYNDROME: ICD-10-CM

## 2025-03-19 NOTE — TELEPHONE ENCOUNTER
----- Message from Kiarra sent at 3/19/2025 11:19 AM CDT -----  Regarding: REFILL  Who Called: Nelida NicholasRefill or New Rx:RefillRX Name and Strength:LINZESS 72 MG  GABAPENTIN 800 MG   COLESEVELAM 625How is the patient currently taking it? (ex. 1XDay):Is this a 30 day or 90 day RX:90 DAY SUPPLYLocal or Mail Order:LOCALList of preferred pharmacies on file (remove unneeded): [unfilled]If different Pharmacy is requested, enter Pharmacy information here including location and phone number: [facility] Ordering Provider:MELISSA Mark Method of Contact: Phone CallPatient's Preferred Phone Number on File: [phone] Best Call Back Number, if different:Additional Information:

## 2025-03-19 NOTE — TELEPHONE ENCOUNTER
Called and informed pt that this has been [facility] to [facility] dr plummer. Pt thanked me for letting her know.   I did speak with pharmacy and they are going to get this ready for pt.

## 2025-03-20 RX ORDER — COLESEVELAM 180 1/1
625 TABLET ORAL 3 TIMES DAILY
Qty: 270 TABLET | Refills: 3 | Status: SHIPPED | OUTPATIENT
Start: 2025-03-20

## 2025-03-20 RX ORDER — DULOXETIN HYDROCHLORIDE 60 MG/1
60 CAPSULE, DELAYED RELEASE ORAL DAILY
Qty: 90 CAPSULE | Refills: 3 | Status: SHIPPED | OUTPATIENT
Start: 2025-03-20

## 2025-03-21 ENCOUNTER — DOCUMENT SCAN (OUTPATIENT)
Dept: HOME HEALTH SERVICES | Facility: HOSPITAL | Age: 70
End: 2025-03-21
Payer: MEDICARE

## 2025-03-24 RX ORDER — LINACLOTIDE 72 UG/1
CAPSULE, GELATIN COATED ORAL
Qty: 90 CAPSULE | Refills: 3 | Status: SHIPPED | OUTPATIENT
Start: 2025-03-24

## 2025-04-03 ENCOUNTER — DOCUMENT SCAN (OUTPATIENT)
Dept: HOME HEALTH SERVICES | Facility: HOSPITAL | Age: 70
End: 2025-04-03
Payer: MEDICARE

## 2025-04-08 ENCOUNTER — DOCUMENT SCAN (OUTPATIENT)
Dept: HOME HEALTH SERVICES | Facility: HOSPITAL | Age: 70
End: 2025-04-08
Payer: MEDICARE

## 2025-04-09 RX ORDER — GABAPENTIN 800 MG/1
800 TABLET ORAL 3 TIMES DAILY
Qty: 90 TABLET | Refills: 11 | Status: CANCELLED | OUTPATIENT
Start: 2025-04-09 | End: 2026-04-09

## 2025-04-09 NOTE — PROGRESS NOTES
Subjective:         Patient ID: Nelida Nicholas is a 69 y.o. female.    Chief Complaint: Back Pain (Patient is having mid back pain.)      Pain  This is a chronic problem. The current episode started more than 1 year ago. The problem occurs daily. The problem has been waxing and waning. Associated symptoms include arthralgias and neck pain. Pertinent negatives include no anorexia, change in bowel habit, chest pain, chills, diaphoresis, fever, sore throat, swollen glands, urinary symptoms or vertigo.     Review of Systems   Constitutional:  Negative for activity change, appetite change, chills, diaphoresis, fever and unexpected weight change.   HENT:  Negative for drooling, ear discharge, ear pain, facial swelling, nosebleeds, sore throat, trouble swallowing, voice change and goiter.    Eyes:  Negative for photophobia, pain, discharge, redness and visual disturbance.   Respiratory:  Negative for apnea, choking, chest tightness, shortness of breath, wheezing and stridor.    Cardiovascular:  Negative for chest pain, palpitations and leg swelling.   Gastrointestinal:  Negative for abdominal distention, anorexia, change in bowel habit, diarrhea, rectal pain and fecal incontinence.   Endocrine: Negative for cold intolerance, heat intolerance, polydipsia, polyphagia and polyuria.   Genitourinary:  Negative for bladder incontinence, dysuria, flank pain, frequency and hot flashes.   Musculoskeletal:  Positive for arthralgias, back pain, gait problem, leg pain and neck pain.   Integumentary:  Negative for color change and pallor.   Allergic/Immunologic: Negative for immunocompromised state.   Neurological:  Negative for dizziness, vertigo, seizures, syncope, facial asymmetry, speech difficulty, light-headedness and coordination difficulties.   Hematological:  Negative for adenopathy. Does not bruise/bleed easily.   Psychiatric/Behavioral:  Negative for agitation, behavioral problems, confusion, decreased concentration,  dysphoric mood, hallucinations, self-injury and suicidal ideas. The patient is not nervous/anxious and is not hyperactive.            Past Medical History:   Diagnosis Date    Allergic rhinitis     Back pain, thoracic     Brittle diabetes     Carotid bruit     right    Chronic anemia     Chronic major depressive disorder, recurrent episode     Chronic migraine w/o aura, not intractable, w/o stat migr     Chronic pain syndrome     Constipation     [place] or callus     Coronary arteriosclerosis     S/P CABBAGE 1997    Degenerative joint disease involving multiple joints     Diabetes mellitus type I     Elevated levels of transaminase & lactic acid dehydrogenase     Encounter for long-term (current) use of insulin     Encounter for long-term (current) use of other medications     Epigastric pain     Fatigue     GERD (gastroesophageal reflux disease)     Hypercalcemia     Hyperkalemia     Hyperlipidemia     Hypo-osmolar hyponatremia     Hypothyroidism     Lumbosacral spondylosis with myelopathy     Memory loss     Osteoporosis     Restless legs syndrome (RLS)     Scoliosis deformity of spine     Sleep disorder     Urinary incontinence     Vitamin D deficiency      Past Surgical History:   Procedure Laterality Date    ARTERIAL DOPPLER  11/07/2017    ARTERIAL DOPPLER DUPLEX Bilateral 11/07/2017    LEGS    BREAST BIOPSY      BREAST SURGERY      BYPASS GRAFT N/A 1999    tripple bypass surgery    CAROTID DOPPLER  11/07/2017    COMPLETE    CARPAL TUNNEL RELEASE      CHOLECYSTECTOMY      CORONARY ARTERY BYPASS GRAFT  1997    3 VESSLE BYPASS    ECHOCARDIOGRAPHY  06/27/2012    with doppler color flow    ESOPHAGOGASTRODUODENOSCOPY  03/11/2011    DIL AND BX    FINGER SURGERY      Trigger finger release all 10 fingers    NECK SURGERY      REDUCTION OF BOTH BREASTS      SHOULDER SURGERY      TOTAL REDUCTION MAMMOPLASTY      TUBAL LIGATION       Social History     Socioeconomic History    Marital status:    Occupational History  "   Occupation: retired   Tobacco Use    Smoking status: Former     Current packs/day: 0.00     Types: Cigarettes     Start date:      Quit date:      Years since quittin.3    Smokeless tobacco: Never    Tobacco comments:     1 pack per week   Substance and Sexual Activity    Alcohol use: Never    Drug use: Never    Sexual activity: Not Currently     Partners: Male     Birth control/protection: None     Family History   Problem Relation Name Age of Onset    Heart disease Paternal Grandfather      Heart disease Maternal Grandmother      Heart disease Father Andre Arizmendi     Hyperlipidemia Father Andre Arizmendi     Hypertension Father Andre Arizmendi     Heart failure Father Andre Arizmendi     Hypertension Mother Tiffanie Arizmendi     Osteoarthritis Mother Tiffanie Arizmendi     Migraines Mother Tiffanie Arizmendi     Rashes / Skin problems Mother Tiffanie Arizmendi     Hypertension Brother Mt Arizmendi     Diabetes Brother Mt Arizmendi     Rashes / Skin problems Brother Mt Arizmendi     Thyroid disease Brother Mt Arizmendi     Breast cancer Maternal Aunt Isela Marquez     Colon cancer Neg Hx      Ovarian cancer Neg Hx       Review of patient's allergies indicates:   Allergen Reactions    Klonopin [clonazepam]     Ketorolac      Other Reaction(s): Unknown    Meperidine      Other Reaction(s): Unknown    Nalbuphine      Other Reaction(s): Unknown    Opioids-meperidine and related     Statins-hmg-coa reductase inhibitors Other (See Comments)     Elevated Liver Enzymes        Objective:  Vitals:    25 1409 25 1411   BP: (!) 114/52    Pulse: 72    Resp: 18    Weight: 65.3 kg (144 lb)    Height: 4' 11" (1.499 m)    PainSc:   4   4   PainLoc: Back                  Physical Exam  Vitals and nursing note reviewed. Exam conducted with a chaperone present.   Constitutional:       General: She is awake. She is not in acute distress.     Appearance: Normal appearance. She is not ill-appearing or diaphoretic.   HENT:      " Head: Normocephalic and atraumatic.      Nose: Nose normal.      Mouth/Throat:      Mouth: Mucous membranes are moist.      Pharynx: Oropharynx is clear.   Eyes:      Conjunctiva/sclera: Conjunctivae normal.      Pupils: Pupils are equal, round, and reactive to light.   Cardiovascular:      Rate and Rhythm: Normal rate.   Pulmonary:      Effort: Pulmonary effort is normal. No respiratory distress.   Abdominal:      Palpations: Abdomen is soft.      Tenderness: There is no guarding.   Musculoskeletal:         General: Normal range of motion.      Cervical back: Normal range of motion and neck supple. No rigidity.   Skin:     General: Skin is warm and dry.      Coloration: Skin is not jaundiced or pale.   Neurological:      General: No focal deficit present.      Mental Status: She is alert and oriented to person, place, and time. Mental status is at baseline.      Cranial Nerves: No cranial nerve deficit (II-XII).      Gait: Gait abnormal.   Psychiatric:         Mood and Affect: Mood normal.         Behavior: Behavior normal. Behavior is cooperative.         Thought Content: Thought content normal.           Mammo Digital Screening Bilat w/ Fernie  Narrative: Facility:  [address] [id number] [phone]    Name: Nelida Nicholas    MRN: [id number]    Result:  Mammo Digital Screening Bilat w/ Fernie    History:  Patient is 69 y.o. and is seen for a screening mammogram.    Films Compared:  Compared to: 08/07/2023 Mammo Digital Screening Bilat and 03/31/2022 Mammo   Digital Screening Bilat     Findings:  This procedure was performed using tomosynthesis.   Computer-aided detection was utilized in the interpretation of this   examination.    There are scattered areas of fibroglandular density. There is no evidence   of suspicious masses, microcalcifications or architectural distortion.  Impression:    No mammographic evidence of malignancy.    BI-RADS Category 1:  Negative    Recommendation:  Routine screening mammogram in 1 year is recommended.    Your estimated lifetime risk of breast cancer (to age 85) based on   Tyrer-Cuzick risk assessment model is 5.82%.  According to the American   Cancer Society, patients with a lifetime breast cancer risk of 20% or   higher might benefit from supplemental screening tests, such as screening   breast MRI.    Adelso Ellington MD       Office Visit on 02/10/2025   Component Date Value Ref Range Status    POC Amphetamines 02/10/2025 Negative  Negative, Inconclusive Final    POC Barbiturates 02/10/2025 Negative  Negative, Inconclusive Final    POC Benzodiazepines 02/10/2025 Negative  Negative, Inconclusive Final    POC Cocaine 02/10/2025 Negative  Negative, Inconclusive Final    POC THC 02/10/2025 Negative  Negative, Inconclusive Final    POC Methadone 02/10/2025 Negative  Negative, Inconclusive Final    POC Methamphetamine 02/10/2025 Negative  Negative, Inconclusive Final    POC Opiates 02/10/2025 Presumptive Positive (A)  Negative, Inconclusive Final    POC Oxycodone 02/10/2025 Negative  Negative, Inconclusive Final    POC Phencyclidine 02/10/2025 Negative  Negative, Inconclusive Final    POC Methylenedioxymethamphetamine * 02/10/2025 Negative  Negative, Inconclusive Final    POC Tricyclic Antidepressants 02/10/2025 Negative  Negative, Inconclusive Final    POC Buprenorphine 02/10/2025 Negative   Final     Acceptable 02/10/2025 Yes   Final    POC Temperature (Urine) 02/10/2025 90   Final   Lab Visit on 12/20/2024   Component Date Value Ref Range Status    Calcium 12/20/2024 9.2  8.4 - 10.2 mg/dL Final   Lab Visit on 12/11/2024   Component Date Value Ref Range Status    TSH 12/11/2024 27.841 (H)  0.350 - 4.940 uIU/mL Final    Free T4 12/11/2024 1.03  0.70 - 1.48 ng/dL Final         No orders of the defined types were placed in this encounter.      Requested Prescriptions     Signed Prescriptions Disp Refills     HYDROcodone-acetaminophen (NORCO) 7.5-325 mg per tablet 120 tablet 0     Sig: Take 1 tablet by mouth every 6 (six) hours as needed for Pain.    HYDROcodone-acetaminophen (NORCO) 7.5-325 mg per tablet 120 tablet 0     Sig: Take 1 tablet by mouth every 6 (six) hours as needed for Pain.    HYDROcodone-acetaminophen (NORCO) 7.5-325 mg per tablet 120 tablet 0     Sig: Take 1 tablet by mouth every 6 (six) hours as needed for Pain.       Assessment:     1. Thoracic spondylosis    2. Lumbosacral radiculopathy    3. Chronic pain syndrome                 A's of Opioid Risk Assessment  Activity:Patient can perform ADL.   Analgesia:Patients pain is partially controlled by current medication. Patient has tried OTC medications such as Tylenol and Ibuprofen with out relief.   Adverse Effects: Patient denies constipation or sedation.  Aberrant Behavior:  reviewed with no aberrant drug seeking/taking behavior.  Overdose reversal drug naloxone discussed    Drug screen reviewed        MRI thoracic spine [facility] August 17, 2023 multiple level degenerative changes    Definitive drug screen August 7, 2023 consistent with oxycodone and metabolites    X-ray lumbar spine [facility] August 7, 2023 multiple level degenerative changes    X-ray thoracic spine [facility] August 7, 2023 multiple level degenerative changes apex right Curvature about 10° similar to prior    X-ray cervical spine [facility] August 7, 2023 multiple level degenerative changes        Plan:    Narcan August 2024    Patient is severely debilitated uses wheelchair for mobility    Patient does not drive    History sternotomy 20+ years ago chronic thoracic pain since that time    Lumbar surgery 20 years ago      Upcoming bladder stimulator implant [facility] in the near future    She states current medications helping control her discomfort    She would like to continue with current medication    Continue home exercise program as  directed    Follow-up 3 months    Dr. Nicholas, February 2026    Bring original prescription medication bottles/container/box with labels to each visit

## 2025-04-14 ENCOUNTER — EXTERNAL HOME HEALTH (OUTPATIENT)
Dept: HOME HEALTH SERVICES | Facility: HOSPITAL | Age: 70
End: 2025-04-14
Payer: MEDICARE

## 2025-04-16 ENCOUNTER — OFFICE VISIT (OUTPATIENT)
Dept: PAIN MEDICINE | Facility: CLINIC | Age: 70
End: 2025-04-16
Payer: MEDICARE

## 2025-04-16 VITALS
SYSTOLIC BLOOD PRESSURE: 114 MMHG | HEART RATE: 72 BPM | WEIGHT: 144 LBS | DIASTOLIC BLOOD PRESSURE: 52 MMHG | BODY MASS INDEX: 29.03 KG/M2 | HEIGHT: 59 IN | RESPIRATION RATE: 18 BRPM

## 2025-04-16 DIAGNOSIS — M54.17 LUMBOSACRAL RADICULOPATHY: Chronic | ICD-10-CM

## 2025-04-16 DIAGNOSIS — G89.4 CHRONIC PAIN SYNDROME: Chronic | ICD-10-CM

## 2025-04-16 DIAGNOSIS — M47.814 THORACIC SPONDYLOSIS: Primary | Chronic | ICD-10-CM

## 2025-04-16 PROCEDURE — 99214 OFFICE O/P EST MOD 30 MIN: CPT | Mod: S$PBB,,, | Performed by: PHYSICIAN ASSISTANT

## 2025-04-16 PROCEDURE — 99215 OFFICE O/P EST HI 40 MIN: CPT | Mod: PBBFAC | Performed by: PHYSICIAN ASSISTANT

## 2025-04-16 PROCEDURE — 99999 PR PBB SHADOW E&M-EST. PATIENT-LVL V: CPT | Mod: PBBFAC,,, | Performed by: PHYSICIAN ASSISTANT

## 2025-04-16 RX ORDER — HYDROCODONE BITARTRATE AND ACETAMINOPHEN 7.5; 325 MG/1; MG/1
1 TABLET ORAL EVERY 6 HOURS PRN
Qty: 120 TABLET | Refills: 0 | Status: SHIPPED | OUTPATIENT
Start: 2025-06-14 | End: 2025-07-14

## 2025-04-16 RX ORDER — HYDROCODONE BITARTRATE AND ACETAMINOPHEN 7.5; 325 MG/1; MG/1
1 TABLET ORAL EVERY 6 HOURS PRN
Qty: 120 TABLET | Refills: 0 | Status: SHIPPED | OUTPATIENT
Start: 2025-04-16

## 2025-04-16 RX ORDER — HYDROCODONE BITARTRATE AND ACETAMINOPHEN 7.5; 325 MG/1; MG/1
1 TABLET ORAL EVERY 6 HOURS PRN
Qty: 120 TABLET | Refills: 0 | Status: SHIPPED | OUTPATIENT
Start: 2025-05-16 | End: 2025-06-15

## 2025-04-21 RX ORDER — TIZANIDINE 4 MG/1
TABLET ORAL
Qty: 180 TABLET | Refills: 1 | Status: SHIPPED | OUTPATIENT
Start: 2025-04-21

## 2025-04-21 RX ORDER — GABAPENTIN 800 MG/1
TABLET ORAL
Qty: 90 TABLET | Refills: 3 | Status: SHIPPED | OUTPATIENT
Start: 2025-04-21

## 2025-05-05 RX ORDER — MONTELUKAST SODIUM 10 MG/1
TABLET ORAL
Qty: 90 TABLET | Refills: 3 | Status: SHIPPED | OUTPATIENT
Start: 2025-05-05

## 2025-05-12 RX ORDER — TRAZODONE HYDROCHLORIDE 150 MG/1
TABLET ORAL
Qty: 90 TABLET | Refills: 3 | Status: SHIPPED | OUTPATIENT
Start: 2025-05-12

## 2025-05-27 RX ORDER — BREXPIPRAZOLE 2 MG/1
1 TABLET ORAL
Qty: 30 TABLET | Refills: 2 | Status: SHIPPED | OUTPATIENT
Start: 2025-05-27

## 2025-06-06 ENCOUNTER — OFFICE VISIT (OUTPATIENT)
Dept: FAMILY MEDICINE | Facility: CLINIC | Age: 70
End: 2025-06-06
Payer: MEDICARE

## 2025-06-06 VITALS
HEIGHT: 59 IN | TEMPERATURE: 98 F | SYSTOLIC BLOOD PRESSURE: 116 MMHG | BODY MASS INDEX: 29.27 KG/M2 | HEART RATE: 77 BPM | OXYGEN SATURATION: 98 % | WEIGHT: 145.19 LBS | RESPIRATION RATE: 18 BRPM | DIASTOLIC BLOOD PRESSURE: 69 MMHG

## 2025-06-06 DIAGNOSIS — G43.009 MIGRAINE WITHOUT AURA AND WITHOUT STATUS MIGRAINOSUS, NOT INTRACTABLE: Chronic | ICD-10-CM

## 2025-06-06 DIAGNOSIS — T46.6X5A MYALGIA DUE TO STATIN: ICD-10-CM

## 2025-06-06 DIAGNOSIS — E78.5 DYSLIPIDEMIA: ICD-10-CM

## 2025-06-06 DIAGNOSIS — E03.9 ACQUIRED HYPOTHYROIDISM: Chronic | ICD-10-CM

## 2025-06-06 DIAGNOSIS — G24.01 TARDIVE DYSKINESIA: ICD-10-CM

## 2025-06-06 DIAGNOSIS — E10.649 TYPE 1 DIABETES MELLITUS WITH HYPOGLYCEMIA AND WITHOUT COMA: ICD-10-CM

## 2025-06-06 DIAGNOSIS — M79.10 MYALGIA DUE TO STATIN: ICD-10-CM

## 2025-06-06 DIAGNOSIS — Z09 FOLLOW-UP EXAM: Primary | ICD-10-CM

## 2025-06-06 DIAGNOSIS — G89.4 CHRONIC PAIN SYNDROME: ICD-10-CM

## 2025-06-06 DIAGNOSIS — I10 PRIMARY HYPERTENSION: Chronic | ICD-10-CM

## 2025-06-06 DIAGNOSIS — I25.118 ATHEROSCLEROTIC HEART DISEASE OF NATIVE CORONARY ARTERY WITH OTHER FORMS OF ANGINA PECTORIS: ICD-10-CM

## 2025-06-06 DIAGNOSIS — S31.000S WOUND OF SACRAL REGION, SEQUELA: ICD-10-CM

## 2025-06-06 DIAGNOSIS — R74.8 ELEVATED LIVER ENZYMES: ICD-10-CM

## 2025-06-06 RX ORDER — EZETIMIBE 10 MG/1
10 TABLET ORAL DAILY
Qty: 90 TABLET | Refills: 3 | Status: SHIPPED | OUTPATIENT
Start: 2025-06-06 | End: 2026-06-06

## 2025-06-06 RX ORDER — MELOXICAM 7.5 MG/1
7.5 TABLET ORAL DAILY
Qty: 90 TABLET | Refills: 3 | Status: SHIPPED | OUTPATIENT
Start: 2025-06-06

## 2025-06-06 RX ORDER — TROSPIUM CHLORIDE 20 MG/1
20 TABLET, FILM COATED ORAL 2 TIMES DAILY
COMMUNITY

## 2025-06-12 ENCOUNTER — DOCUMENT SCAN (OUTPATIENT)
Dept: HOME HEALTH SERVICES | Facility: HOSPITAL | Age: 70
End: 2025-06-12
Payer: MEDICARE

## 2025-06-19 DIAGNOSIS — M81.0 OSTEOPOROSIS, UNSPECIFIED OSTEOPOROSIS TYPE, UNSPECIFIED PATHOLOGICAL FRACTURE PRESENCE: Primary | ICD-10-CM

## 2025-06-23 ENCOUNTER — DOCUMENT SCAN (OUTPATIENT)
Dept: HOME HEALTH SERVICES | Facility: HOSPITAL | Age: 70
End: 2025-06-23
Payer: MEDICARE

## 2025-06-30 ENCOUNTER — DOCUMENT SCAN (OUTPATIENT)
Dept: HOME HEALTH SERVICES | Facility: HOSPITAL | Age: 70
End: 2025-06-30
Payer: MEDICARE

## 2025-06-30 NOTE — TELEPHONE ENCOUNTER
Copied from CRM #[id number]. Topic: Medications - New Medication Request  >> Jun 30, 2025  1:01 PM Tino wrote:  PT called in to see if she can get  genital yeast  infection creme or antibiotics  and if she doesn't  please leave a message sent to [facility] - [place], [place] - [address]  [address] [facility] [place] [address]  Phone: [phone] Fax: [phone]  Hours: Not open 24 hours

## 2025-07-01 RX ORDER — HYDROXYZINE PAMOATE 25 MG/1
25 CAPSULE ORAL EVERY 8 HOURS PRN
Qty: 90 CAPSULE | Refills: 5 | Status: SHIPPED | OUTPATIENT
Start: 2025-07-01

## 2025-07-01 RX ORDER — FLUCONAZOLE 150 MG/1
150 TABLET ORAL DAILY
Qty: 1 TABLET | Refills: 0 | Status: SHIPPED | OUTPATIENT
Start: 2025-07-01 | End: 2025-07-02

## 2025-07-07 DIAGNOSIS — J00 ACUTE RHINITIS: ICD-10-CM

## 2025-07-07 RX ORDER — FLUTICASONE PROPIONATE 50 MCG
SPRAY, SUSPENSION (ML) NASAL
Qty: 48 G | Refills: 3 | Status: SHIPPED | OUTPATIENT
Start: 2025-07-07

## 2025-07-08 RX ORDER — AZELASTINE 1 MG/ML
SPRAY, METERED NASAL
Qty: 90 ML | Refills: 1 | Status: SHIPPED | OUTPATIENT
Start: 2025-07-08

## 2025-07-08 NOTE — PROGRESS NOTES
Subjective:         Patient ID: Nelida Nicholas is a 70 y.o. female.    Chief Complaint: Back Pain      Pain  This is a chronic problem. The current episode started more than 1 year ago. The problem occurs daily. The problem has been unchanged. Associated symptoms include arthralgias and neck pain. Pertinent negatives include no anorexia, change in bowel habit, chest pain, chills, diaphoresis, fever, sore throat, swollen glands, urinary symptoms or vertigo.     Review of Systems   Constitutional:  Negative for activity change, appetite change, chills, diaphoresis, fever and unexpected weight change.   HENT:  Negative for drooling, ear discharge, ear pain, facial swelling, nosebleeds, sore throat, trouble swallowing, voice change and goiter.    Eyes:  Negative for photophobia, pain, discharge, redness and visual disturbance.   Respiratory:  Negative for apnea, choking, chest tightness, shortness of breath, wheezing and stridor.    Cardiovascular:  Negative for chest pain, palpitations and leg swelling.   Gastrointestinal:  Negative for abdominal distention, anorexia, change in bowel habit, diarrhea, rectal pain and fecal incontinence.   Endocrine: Negative for cold intolerance, heat intolerance, polydipsia, polyphagia and polyuria.   Genitourinary:  Negative for bladder incontinence, dysuria, flank pain, frequency and hot flashes.   Musculoskeletal:  Positive for arthralgias, back pain, gait problem, leg pain and neck pain.   Integumentary:  Negative for color change and pallor.   Allergic/Immunologic: Negative for immunocompromised state.   Neurological:  Negative for dizziness, vertigo, seizures, syncope, facial asymmetry, speech difficulty, light-headedness, coordination difficulties and memory loss.   Hematological:  Negative for adenopathy. Does not bruise/bleed easily.   Psychiatric/Behavioral:  Negative for agitation, behavioral problems, confusion, decreased concentration, dysphoric mood, hallucinations,  self-injury and suicidal ideas. The patient is not nervous/anxious and is not hyperactive.            Past Medical History:   Diagnosis Date    Allergic rhinitis     Back pain, thoracic     Brittle diabetes     Carotid bruit     right    Chronic anemia     Chronic major depressive disorder, recurrent episode     Chronic migraine w/o aura, not intractable, w/o stat migr     Chronic pain syndrome     Constipation     Emmett or callus     Coronary arteriosclerosis     S/P CABBAGE 1997    Degenerative joint disease involving multiple joints     Diabetes mellitus type I     Elevated levels of transaminase & lactic acid dehydrogenase     Encounter for long-term (current) use of insulin     Encounter for long-term (current) use of other medications     Epigastric pain     Fatigue     GERD (gastroesophageal reflux disease)     Hypercalcemia     Hyperkalemia     Hyperlipidemia     Hypo-osmolar hyponatremia     Hypothyroidism     Lumbosacral spondylosis with myelopathy     Memory loss     Osteoporosis     Restless legs syndrome (RLS)     Scoliosis deformity of spine     Sleep disorder     Urinary incontinence     Vitamin D deficiency      Past Surgical History:   Procedure Laterality Date    ARTERIAL DOPPLER  11/07/2017    ARTERIAL DOPPLER DUPLEX Bilateral 11/07/2017    LEGS    BREAST BIOPSY      BREAST SURGERY      BYPASS GRAFT N/A 1999    tripple bypass surgery    CAROTID DOPPLER  11/07/2017    COMPLETE    CARPAL TUNNEL RELEASE      CHOLECYSTECTOMY      CORONARY ARTERY BYPASS GRAFT  1997    3 VESSLE BYPASS    ECHOCARDIOGRAPHY  06/27/2012    with doppler color flow    ESOPHAGOGASTRODUODENOSCOPY  03/11/2011    DIL AND BX    FINGER SURGERY      Trigger finger release all 10 fingers    NECK SURGERY      REDUCTION OF BOTH BREASTS      SHOULDER SURGERY      TOTAL REDUCTION MAMMOPLASTY      TUBAL LIGATION       Social History     Socioeconomic History    Marital status:    Occupational History    Occupation: retired    Tobacco Use    Smoking status: Former     Current packs/day: 0.00     Types: Cigarettes     Start date:      Quit date:      Years since quittin.5    Smokeless tobacco: Never    Tobacco comments:     1 pack per week   Substance and Sexual Activity    Alcohol use: Never    Drug use: Never    Sexual activity: Not Currently     Partners: Male     Birth control/protection: None     Family History   Problem Relation Name Age of Onset    Heart disease Paternal Grandfather      Heart disease Maternal Grandmother      Heart disease Father Andre Arizmendi     Hyperlipidemia Father Andre Arizmendi     Hypertension Father Andre Arizmendi     Heart failure Father Andre Arizmendi     Hypertension Mother Tiffanie Arizmendi     Osteoarthritis Mother Tiffanie Arizmendi     Migraines Mother Tiffanie Arizmendi     Rashes / Skin problems Mother Tiffanie Arizmendi     Hypertension Brother Mt Arizmendi     Diabetes Brother Mt Arizmendi     Rashes / Skin problems Brother Mt Arizmendi     Thyroid disease Brother Mt Arizmendi     Breast cancer Maternal Aunt Isela Marquez     Colon cancer Neg Hx      Ovarian cancer Neg Hx       Review of patient's allergies indicates:   Allergen Reactions    Statins-hmg-coa reductase inhibitors Other (See Comments) and Shortness Of Breath     Elevated Liver Enzymes    Klonopin [clonazepam]     Ketorolac      Other Reaction(s): Unknown    Meperidine      Other Reaction(s): Unknown    Nalbuphine      Other Reaction(s): Unknown    Opioids-meperidine and related     Silver Hives     blisters        Objective:  Vitals:    25 1445 25 1453   BP: (!) 119/46    Pulse: 79    Resp: 17    SpO2: 98%    Weight: 64 kg (141 lb)    PainSc:   7   7                   Physical Exam  Vitals and nursing note reviewed. Exam conducted with a chaperone present.   Constitutional:       General: She is awake. She is not in acute distress.     Appearance: Normal appearance. She is not ill-appearing or diaphoretic.   HENT:      Head:  Normocephalic and atraumatic.      Nose: Nose normal.      Mouth/Throat:      Mouth: Mucous membranes are moist.      Pharynx: Oropharynx is clear.   Eyes:      Conjunctiva/sclera: Conjunctivae normal.      Pupils: Pupils are equal, round, and reactive to light.   Cardiovascular:      Rate and Rhythm: Normal rate.   Pulmonary:      Effort: Pulmonary effort is normal. No respiratory distress.   Abdominal:      Palpations: Abdomen is soft.      Tenderness: There is no guarding.   Musculoskeletal:         General: Normal range of motion.      Cervical back: Normal range of motion and neck supple. No rigidity.   Skin:     General: Skin is warm and dry.      Coloration: Skin is not jaundiced or pale.   Neurological:      General: No focal deficit present.      Mental Status: She is alert and oriented to person, place, and time. Mental status is at baseline.      Cranial Nerves: No cranial nerve deficit (II-XII).      Gait: Gait abnormal.   Psychiatric:         Mood and Affect: Mood normal.         Behavior: Behavior normal. Behavior is cooperative.         Thought Content: Thought content normal.           Mammo Digital Screening Bilat w/ Fernie  Narrative: Facility:  [address] [address]  [phone]    Name: eNlida Nicholas    MRN: [id number]    Result:  Mammo Digital Screening Bilat w/ Fernie    History:  Patient is 69 y.o. and is seen for a screening mammogram.    Films Compared:  Compared to: 08/07/2023 Mammo Digital Screening Bilat and 03/31/2022 Mammo   Digital Screening Bilat     Findings:  This procedure was performed using tomosynthesis.   Computer-aided detection was utilized in the interpretation of this   examination.    There are scattered areas of fibroglandular density. There is no evidence   of suspicious masses, microcalcifications or architectural distortion.  Impression:    No mammographic evidence of malignancy.    BI-RADS Category 1:  Negative    Recommendation:  Routine screening mammogram in 1 year is recommended.    Your estimated lifetime risk of breast cancer (to age 85) based on   Tyrer-Cuzick risk assessment model is 5.82%.  According to the American   Cancer Society, patients with a lifetime breast cancer risk of 20% or   higher might benefit from supplemental screening tests, such as screening   breast MRI.    Adelso Ellington MD       Office Visit on 02/10/2025   Component Date Value Ref Range Status    POC Amphetamines 02/10/2025 Negative  Negative, Inconclusive Final    POC Barbiturates 02/10/2025 Negative  Negative, Inconclusive Final    POC Benzodiazepines 02/10/2025 Negative  Negative, Inconclusive Final    POC Cocaine 02/10/2025 Negative  Negative, Inconclusive Final    POC THC 02/10/2025 Negative  Negative, Inconclusive Final    POC Methadone 02/10/2025 Negative  Negative, Inconclusive Final    POC Methamphetamine 02/10/2025 Negative  Negative, Inconclusive Final    POC Opiates 02/10/2025 Presumptive Positive (A)  Negative, Inconclusive Final    POC Oxycodone 02/10/2025 Negative  Negative, Inconclusive Final    POC Phencyclidine 02/10/2025 Negative  Negative, Inconclusive Final    POC Methylenedioxymethamphetamine * 02/10/2025 Negative  Negative, Inconclusive Final    POC Tricyclic Antidepressants 02/10/2025 Negative  Negative, Inconclusive Final    POC Buprenorphine 02/10/2025 Negative   Final     Acceptable 02/10/2025 Yes   Final    POC Temperature (Urine) 02/10/2025 90   Final         Orders Placed This Encounter   Procedures    Ambulatory referral/consult to Neurology     Standing Status:   Future     Expected Date:   7/23/2025     Expiration Date:   8/16/2026     Referral Priority:   Routine     Referral Type:   Consultation     Referral Reason:   Specialty Services Required     Referred to Provider:   Justus Eaton FNP     Requested Specialty:   Neurology     Number of Visits Requested:   1    POCT  Urine Drug Screen Presump     Interpretive Information:     Negative:  No drug detected at the cut off level.   Positive:  This result represents presumptive positive for the   tested drug, other substances may yield a positive response other   than the analyte of interest. This result should be utilized for   diagnostic purpose only. Confirmation testing will be performed upon physician request only.          Requested Prescriptions     Signed Prescriptions Disp Refills    HYDROcodone-acetaminophen (NORCO) 7.5-325 mg per tablet 120 tablet 0     Sig: Take 1 tablet by mouth every 6 (six) hours as needed for Pain.    HYDROcodone-acetaminophen (NORCO) 7.5-325 mg per tablet 120 tablet 0     Sig: Take 1 tablet by mouth every 6 (six) hours as needed for Pain.    HYDROcodone-acetaminophen (NORCO) 7.5-325 mg per tablet 120 tablet 0     Sig: Take 1 tablet by mouth every 6 (six) hours as needed for Pain.       Assessment:     1. Lumbosacral radiculopathy    2. Chronic pain syndrome    3. Thoracic spondylosis    4. Encounter for long-term (current) use of medications    5. Falling episodes                   A's of Opioid Risk Assessment  Activity:Patient can perform ADL.   Analgesia:Patients pain is partially controlled by current medication. Patient has tried OTC medications such as Tylenol and Ibuprofen with out relief.   Adverse Effects: Patient denies constipation or sedation.  Aberrant Behavior:  reviewed with no aberrant drug seeking/taking behavior.  Overdose reversal drug naloxone discussed    Drug screen reviewed        MRI thoracic spine [facility] August 17, 2023 multiple level degenerative changes    Definitive drug screen August 7, 2023 consistent with oxycodone and metabolites    X-ray lumbar spine [facility] August 7, 2023 multiple level degenerative changes    X-ray thoracic spine [facility] August 7, 2023 multiple level degenerative changes apex right Curvature about 10° similar to prior    X-ray  cervical spine [facility] August 7, 2023 multiple level degenerative changes        Plan:    Narcan August 2024    Patient is severely debilitated uses wheelchair for mobility    Patient does not drive    History sternotomy 20+ years ago chronic thoracic pain since that time    Lumbar surgery 20 years ago      Complaint increasing following unsteady gait decrease memory     Requesting referral neurology    She states current medications helping control her discomfort    She would like to continue with current medication    Continue home exercise program as directed    Follow-up 3 months    Dr. Nicholas, February 2026    Bring original prescription medication bottles/container/box with labels to each visit

## 2025-07-14 ENCOUNTER — TELEPHONE (OUTPATIENT)
Dept: FAMILY MEDICINE | Facility: CLINIC | Age: 70
End: 2025-07-14
Payer: MEDICARE

## 2025-07-14 NOTE — TELEPHONE ENCOUNTER
Copied from CRM #[id number]. Topic: General Inquiry - Patient Advice  >> Jul 14, 2025  3:04 PM Alice wrote:  Who Called: Joaquim with NP Catalina Lawrence's office     Making sure that you received the UA that the home health nurse did on patient. Has an extensive history of UTI's.       Preferred Method of Contact: Phone Call  Best Call Back Number, if different:[phone]

## 2025-07-14 NOTE — TELEPHONE ENCOUNTER
Spoke to HH nurse and let her know I have the fax but dr berger is out of the office this week. I have asked jeremias Wayne fnp to review ua results (these will be scanned into pt media). She reviewed and I let her know nurse stated pt is having pain and visible blood in brief. According to pts record, it appears she is scheduled to have surgery with [facility] on 07/22/2025. ZAHEER wayne has advised for them to get in contact with pts urologist so they are aware of current issues. Nurse thanked me

## 2025-07-16 ENCOUNTER — OFFICE VISIT (OUTPATIENT)
Dept: PAIN MEDICINE | Facility: CLINIC | Age: 70
End: 2025-07-16
Payer: MEDICARE

## 2025-07-16 VITALS
WEIGHT: 141 LBS | SYSTOLIC BLOOD PRESSURE: 119 MMHG | DIASTOLIC BLOOD PRESSURE: 46 MMHG | OXYGEN SATURATION: 98 % | HEART RATE: 79 BPM | RESPIRATION RATE: 17 BRPM | BODY MASS INDEX: 28.48 KG/M2

## 2025-07-16 DIAGNOSIS — Z79.899 ENCOUNTER FOR LONG-TERM (CURRENT) USE OF MEDICATIONS: ICD-10-CM

## 2025-07-16 DIAGNOSIS — G89.4 CHRONIC PAIN SYNDROME: Chronic | ICD-10-CM

## 2025-07-16 DIAGNOSIS — M47.814 THORACIC SPONDYLOSIS: Chronic | ICD-10-CM

## 2025-07-16 DIAGNOSIS — M54.17 LUMBOSACRAL RADICULOPATHY: Primary | Chronic | ICD-10-CM

## 2025-07-16 DIAGNOSIS — R29.6 FALLING EPISODES: Chronic | ICD-10-CM

## 2025-07-16 PROCEDURE — 99999 PR PBB SHADOW E&M-EST. PATIENT-LVL V: CPT | Mod: PBBFAC,,, | Performed by: PHYSICIAN ASSISTANT

## 2025-07-16 PROCEDURE — 99999PBSHW POCT URINE DRUG SCREEN PRESUMP: Mod: PBBFAC,,,

## 2025-07-16 PROCEDURE — 99214 OFFICE O/P EST MOD 30 MIN: CPT | Mod: S$PBB,,, | Performed by: PHYSICIAN ASSISTANT

## 2025-07-16 PROCEDURE — 80305 DRUG TEST PRSMV DIR OPT OBS: CPT | Mod: PBBFAC | Performed by: PHYSICIAN ASSISTANT

## 2025-07-16 PROCEDURE — 99215 OFFICE O/P EST HI 40 MIN: CPT | Mod: PBBFAC | Performed by: PHYSICIAN ASSISTANT

## 2025-07-16 RX ORDER — HYDROCODONE BITARTRATE AND ACETAMINOPHEN 7.5; 325 MG/1; MG/1
1 TABLET ORAL EVERY 6 HOURS PRN
Qty: 120 TABLET | Refills: 0 | Status: SHIPPED | OUTPATIENT
Start: 2025-07-19

## 2025-07-16 RX ORDER — HYDROCODONE BITARTRATE AND ACETAMINOPHEN 7.5; 325 MG/1; MG/1
1 TABLET ORAL EVERY 6 HOURS PRN
Qty: 120 TABLET | Refills: 0 | Status: SHIPPED | OUTPATIENT
Start: 2025-09-17 | End: 2025-10-17

## 2025-07-16 RX ORDER — HYDROCODONE BITARTRATE AND ACETAMINOPHEN 7.5; 325 MG/1; MG/1
1 TABLET ORAL EVERY 6 HOURS PRN
Qty: 120 TABLET | Refills: 0 | Status: SHIPPED | OUTPATIENT
Start: 2025-08-18 | End: 2025-09-17

## 2025-08-18 RX ORDER — BREXPIPRAZOLE 2 MG/1
1 TABLET ORAL
Qty: 30 TABLET | Refills: 2 | Status: SHIPPED | OUTPATIENT
Start: 2025-08-18